# Patient Record
Sex: FEMALE | Race: WHITE | NOT HISPANIC OR LATINO | Employment: UNEMPLOYED | URBAN - METROPOLITAN AREA
[De-identification: names, ages, dates, MRNs, and addresses within clinical notes are randomized per-mention and may not be internally consistent; named-entity substitution may affect disease eponyms.]

---

## 2017-02-07 ENCOUNTER — HOSPITAL ENCOUNTER (OUTPATIENT)
Dept: CT IMAGING | Facility: HOSPITAL | Age: 47
Discharge: HOME | End: 2017-02-07
Attending: FAMILY MEDICINE | Admitting: FAMILY MEDICINE

## 2017-02-07 ENCOUNTER — OFFICE VISIT (OUTPATIENT)
Dept: INTERNAL MEDICINE | Facility: CLINIC | Age: 47
End: 2017-02-07

## 2017-02-07 ENCOUNTER — TELEPHONE (OUTPATIENT)
Dept: INTERNAL MEDICINE | Facility: CLINIC | Age: 47
End: 2017-02-07

## 2017-02-07 VITALS
HEART RATE: 83 BPM | BODY MASS INDEX: 45.86 KG/M2 | OXYGEN SATURATION: 94 % | SYSTOLIC BLOOD PRESSURE: 136 MMHG | TEMPERATURE: 98.5 F | DIASTOLIC BLOOD PRESSURE: 82 MMHG | HEIGHT: 63 IN | WEIGHT: 258.8 LBS

## 2017-02-07 DIAGNOSIS — R10.827 GENERALIZED ABDOMINAL TENDERNESS WITH REBOUND TENDERNESS: ICD-10-CM

## 2017-02-07 DIAGNOSIS — K92.1 MELENA: Primary | ICD-10-CM

## 2017-02-07 DIAGNOSIS — E11.49 CONTROLLED TYPE 2 DIABETES MELLITUS WITH OTHER NEUROLOGIC COMPLICATION: ICD-10-CM

## 2017-02-07 DIAGNOSIS — K92.1 MELENA: ICD-10-CM

## 2017-02-07 DIAGNOSIS — J02.9 SORE THROAT: ICD-10-CM

## 2017-02-07 PROBLEM — R10.829: Status: ACTIVE | Noted: 2017-02-07

## 2017-02-07 LAB
ALBUMIN SERPL-MCNC: 4.2 G/DL (ref 3.2–4.8)
ALBUMIN/GLOB SERPL: 1.4 G/DL (ref 1.5–2.5)
ALP SERPL-CCNC: 106 U/L (ref 25–100)
ALT SERPL W P-5'-P-CCNC: 40 U/L (ref 7–40)
ANION GAP SERPL CALCULATED.3IONS-SCNC: 8 MMOL/L (ref 3–11)
AST SERPL-CCNC: 34 U/L (ref 0–33)
BASOPHILS # BLD AUTO: 0.03 10*3/MM3 (ref 0–0.2)
BASOPHILS NFR BLD AUTO: 0.3 % (ref 0–1)
BILIRUB SERPL-MCNC: 0.4 MG/DL (ref 0.3–1.2)
BUN BLD-MCNC: 13 MG/DL (ref 9–23)
BUN/CREAT SERPL: 16.3 (ref 7–25)
CALCIUM SPEC-SCNC: 9.5 MG/DL (ref 8.7–10.4)
CHLORIDE SERPL-SCNC: 103 MMOL/L (ref 99–109)
CO2 SERPL-SCNC: 27 MMOL/L (ref 20–31)
CREAT BLD-MCNC: 0.8 MG/DL (ref 0.6–1.3)
DEPRECATED RDW RBC AUTO: 47.6 FL (ref 37–54)
EOSINOPHIL # BLD AUTO: 0.15 10*3/MM3 (ref 0.1–0.3)
EOSINOPHIL NFR BLD AUTO: 1.3 % (ref 0–3)
ERYTHROCYTE [DISTWIDTH] IN BLOOD BY AUTOMATED COUNT: 15 % (ref 11.3–14.5)
EXPIRATION DATE: NORMAL
GFR SERPL CREATININE-BSD FRML MDRD: 77 ML/MIN/1.73
GLOBULIN UR ELPH-MCNC: 3.1 GM/DL
GLUCOSE BLD-MCNC: 227 MG/DL (ref 70–100)
HBA1C MFR BLD: 8.3 % (ref 4.8–5.6)
HCT VFR BLD AUTO: 46.7 % (ref 34.5–44)
HGB BLD-MCNC: 15.5 G/DL (ref 11.5–15.5)
IMM GRANULOCYTES # BLD: 0.08 10*3/MM3 (ref 0–0.03)
IMM GRANULOCYTES NFR BLD: 0.7 % (ref 0–0.6)
INTERNAL CONTROL: NORMAL
LIPASE SERPL-CCNC: 69 U/L (ref 6–51)
LYMPHOCYTES # BLD AUTO: 2.95 10*3/MM3 (ref 0.6–4.8)
LYMPHOCYTES NFR BLD AUTO: 24.7 % (ref 24–44)
Lab: NORMAL
MCH RBC QN AUTO: 28.5 PG (ref 27–31)
MCHC RBC AUTO-ENTMCNC: 33.2 G/DL (ref 32–36)
MCV RBC AUTO: 86 FL (ref 80–99)
MONOCYTES # BLD AUTO: 0.72 10*3/MM3 (ref 0–1)
MONOCYTES NFR BLD AUTO: 6 % (ref 0–12)
NEUTROPHILS # BLD AUTO: 8.02 10*3/MM3 (ref 1.5–8.3)
NEUTROPHILS NFR BLD AUTO: 67 % (ref 41–71)
PLATELET # BLD AUTO: 252 10*3/MM3 (ref 150–450)
PMV BLD AUTO: 10.5 FL (ref 6–12)
POTASSIUM BLD-SCNC: 4.4 MMOL/L (ref 3.5–5.5)
PROT SERPL-MCNC: 7.3 G/DL (ref 5.7–8.2)
RBC # BLD AUTO: 5.43 10*6/MM3 (ref 3.89–5.14)
S PYO AG THROAT QL: NEGATIVE
SODIUM BLD-SCNC: 138 MMOL/L (ref 132–146)
WBC NRBC COR # BLD: 11.95 10*3/MM3 (ref 3.5–10.8)

## 2017-02-07 PROCEDURE — 83036 HEMOGLOBIN GLYCOSYLATED A1C: CPT | Performed by: FAMILY MEDICINE

## 2017-02-07 PROCEDURE — 74176 CT ABD & PELVIS W/O CONTRAST: CPT

## 2017-02-07 PROCEDURE — 83690 ASSAY OF LIPASE: CPT | Performed by: FAMILY MEDICINE

## 2017-02-07 PROCEDURE — 85025 COMPLETE CBC W/AUTO DIFF WBC: CPT | Performed by: FAMILY MEDICINE

## 2017-02-07 PROCEDURE — 87880 STREP A ASSAY W/OPTIC: CPT | Performed by: FAMILY MEDICINE

## 2017-02-07 PROCEDURE — 80053 COMPREHEN METABOLIC PANEL: CPT | Performed by: FAMILY MEDICINE

## 2017-02-07 PROCEDURE — 99214 OFFICE O/P EST MOD 30 MIN: CPT | Performed by: FAMILY MEDICINE

## 2017-02-07 PROCEDURE — 36415 COLL VENOUS BLD VENIPUNCTURE: CPT | Performed by: FAMILY MEDICINE

## 2017-02-07 NOTE — TELEPHONE ENCOUNTER
----- Message from Sarah Cohn sent at 2/7/2017  2:16 PM EST -----  Contact: PT  PT SAID SHE MISSED A CALL FROM OUR OFFICE CONCERNING HER CT.

## 2017-02-07 NOTE — PROGRESS NOTES
Subjective   Karli Ricardo is a 46 y.o. female.     History of Present Illness   Had some sort of cold last week, had a sore throat, and got better. Last night she was up all night due to the back and belly pain. Has a lot of soreness in abdomen. Feels swollen in abdomen. Abdomen tender even when she rubs it. Also tender on left side. No dysuria nor hematuria. Mother has had kidney stones but patient has not. She's had diarrhea off and on for 3 days. Describes bristol 1 stools at first and now is bristol 5-6. She's had some bristol 7 stools. Has had black stools, no bloody stools. No use of pepto bismol. Has had chills. Has felt feverish. Has had many abdominal surgeries including hysterectomy.     The following portions of the patient's history were reviewed and updated as appropriate: allergies, current medications, past family history, past medical history, past social history, past surgical history and problem list.    Review of Systems   Constitutional: Positive for activity change, appetite change, chills, fatigue and fever.   Gastrointestinal: Positive for abdominal distention, abdominal pain, nausea and vomiting.   Genitourinary: Negative for difficulty urinating, dysuria and hematuria.       Objective   Physical Exam   Constitutional: She is oriented to person, place, and time. Vital signs are normal. She appears well-developed and well-nourished. She is active. She appears ill.   Appears stated age. Well groomed. Extremely Obese     HENT:   Head: Normocephalic and atraumatic.   Right Ear: Hearing normal.   Left Ear: Hearing normal.   Nose: Nose normal.   Mouth/Throat: Uvula is midline and mucous membranes are normal. Posterior oropharyngeal erythema present.   Eyes: EOM and lids are normal. Pupils are equal, round, and reactive to light.   Neck: Phonation normal.   Cardiovascular: Normal rate, regular rhythm and normal heart sounds.    Pulmonary/Chest: Effort normal and breath sounds normal.    Abdominal: Soft. She exhibits distension. She exhibits no mass. Bowel sounds are decreased. There is no hepatosplenomegaly. There is generalized tenderness. There is rebound. There is no guarding.   Neurological: She is alert and oriented to person, place, and time. She displays no tremor. No cranial nerve deficit. Gait normal.   Skin: She is not diaphoretic. No cyanosis. There is pallor. Nails show no clubbing.   Psychiatric: She has a normal mood and affect. Her speech is normal and behavior is normal. Judgment and thought content normal.   Nursing note and vitals reviewed.    Lab Results   Component Value Date    HGBA1C 6.60 (H) 09/13/2016     Strep test negative.    Assessment/Plan   [place]ia was seen today for flank pain, back pain and diarrhea.    Diagnoses and all orders for this visit:    Melena  -     CT Abdomen Pelvis Without Contrast; Future  -     CBC Auto Differential; Future    Generalized abdominal tenderness with rebound tenderness  -     CT Abdomen Pelvis Without Contrast; Future  -     CBC Auto Differential; Future  -     Comprehensive Metabolic Panel; Future  -     Lipase; Future    Controlled type 2 diabetes mellitus with other neurologic complication  -     Hemoglobin A1c; Future    Sore throat  -     POC Rapid Strep A      Must rule out diverticulitis with perforation, pancreatitis, gastric/duodenal ulcer with perforation, etc. Peritoneal sign worrisome as is history of black stool. Stat CT scan requested.

## 2017-02-15 ENCOUNTER — OFFICE VISIT (OUTPATIENT)
Dept: INTERNAL MEDICINE | Facility: CLINIC | Age: 47
End: 2017-02-15

## 2017-02-15 VITALS
BODY MASS INDEX: 46.46 KG/M2 | HEIGHT: 63 IN | HEART RATE: 83 BPM | TEMPERATURE: 98.7 F | OXYGEN SATURATION: 97 % | SYSTOLIC BLOOD PRESSURE: 124 MMHG | WEIGHT: 262.2 LBS | DIASTOLIC BLOOD PRESSURE: 80 MMHG | RESPIRATION RATE: 14 BRPM

## 2017-02-15 DIAGNOSIS — L29.8 PRURITIC ERYTHEMATOUS RASH: ICD-10-CM

## 2017-02-15 DIAGNOSIS — IMO0001 UNCONTROLLED TYPE 2 DIABETES MELLITUS WITHOUT COMPLICATION, WITHOUT LONG-TERM CURRENT USE OF INSULIN: Primary | ICD-10-CM

## 2017-02-15 DIAGNOSIS — K92.1 MELENA: ICD-10-CM

## 2017-02-15 PROBLEM — L29.89 PRURITIC ERYTHEMATOUS RASH: Status: ACTIVE | Noted: 2017-02-15

## 2017-02-15 PROCEDURE — 99214 OFFICE O/P EST MOD 30 MIN: CPT | Performed by: FAMILY MEDICINE

## 2017-02-15 RX ORDER — METFORMIN HYDROCHLORIDE 750 MG/1
750 TABLET, EXTENDED RELEASE ORAL
Qty: 30 TABLET | Refills: 2 | Status: SHIPPED | OUTPATIENT
Start: 2017-02-15 | End: 2017-03-02

## 2017-02-17 NOTE — PROGRESS NOTES
Subjective   Karli Ricardo is a 46 y.o. female.     Abdominal Pain   This is a recurrent problem. The current episode started 1 to 4 weeks ago. The problem occurs intermittently. The problem has been waxing and waning. Associated symptoms include diarrhea and melena. Pertinent negatives include no fever or weight loss. Prior diagnostic workup includes CT scan.   Diabetes   She presents for her follow-up diabetic visit. She has type 2 diabetes mellitus. Her disease course has been worsening. Associated symptoms include polyphagia and polyuria. Pertinent negatives for diabetes include no chest pain and no weight loss. There are no hypoglycemic complications. Risk factors for coronary artery disease include diabetes mellitus, obesity and sedentary lifestyle. Current diabetic treatment includes oral agent (monotherapy). Her weight is increasing steadily. She is following a generally unhealthy diet. An ACE inhibitor/angiotensin II receptor blocker is being taken.      Continues to have rash on legs. She tried an antibiotic cream and it has not helped. Rash not as bright but itches. Has been present for >1 month.    The following portions of the patient's history were reviewed and updated as appropriate: allergies, current medications, past family history, past medical history, past social history, past surgical history and problem list.    Review of Systems   Constitutional: Negative for fever and weight loss.   Cardiovascular: Negative for chest pain.   Gastrointestinal: Positive for abdominal pain, diarrhea and melena.   Endocrine: Positive for polyphagia and polyuria.   Skin: Positive for rash.       Objective   Physical Exam   Constitutional: She is oriented to person, place, and time. Vital signs are normal. She appears well-developed and well-nourished. She is active.   HENT:   Head: Normocephalic and atraumatic.   Right Ear: Hearing normal.   Left Ear: Hearing normal.   Nose: Nose normal.   Eyes: EOM and  lids are normal. Pupils are equal, round, and reactive to light.   Cardiovascular: Normal rate, regular rhythm and normal heart sounds.    Pulmonary/Chest: Effort normal and breath sounds normal.   Neurological: She is alert and oriented to person, place, and time. No cranial nerve deficit. Gait normal.   Skin: Skin is warm. Rash (faintly erythematous circular rash to anterior legs bilaterally with serpiginous borders and some central clearing) noted. She is not diaphoretic. No cyanosis. Nails show no clubbing.   Psychiatric: She has a normal mood and affect. Her behavior is normal. Judgment and thought content normal.   Nursing note and vitals reviewed.    Lab Results   Component Value Date    HGBA1C 8.30 (H) 02/07/2017         Assessment/Plan   [place]ia was seen today for abdominal pain.    Diagnoses and all orders for this visit:    Uncontrolled type 2 diabetes mellitus without complication, without long-term current use of insulin  -     metFORMIN ER (GLUCOPHAGE-XR) 750 MG 24 hr tablet; Take 1 tablet by mouth Daily With Breakfast.    Pruritic erythematous rash  -     Clotrimazole 1 % ointment; Apply 1 application topically 2 (Two) Times a Day.    Melena    scheduled for further evaluation of melena

## 2017-03-02 ENCOUNTER — OFFICE VISIT (OUTPATIENT)
Dept: SURGERY | Facility: CLINIC | Age: 47
End: 2017-03-02

## 2017-03-02 VITALS
DIASTOLIC BLOOD PRESSURE: 88 MMHG | SYSTOLIC BLOOD PRESSURE: 140 MMHG | BODY MASS INDEX: 46.39 KG/M2 | HEIGHT: 63 IN | TEMPERATURE: 97.2 F | WEIGHT: 261.8 LBS

## 2017-03-02 DIAGNOSIS — K21.9 GASTROESOPHAGEAL REFLUX DISEASE WITHOUT ESOPHAGITIS: ICD-10-CM

## 2017-03-02 DIAGNOSIS — R10.13 EPIGASTRIC ABDOMINAL PAIN: Primary | ICD-10-CM

## 2017-03-02 PROCEDURE — 99244 OFF/OP CNSLTJ NEW/EST MOD 40: CPT | Performed by: SURGERY

## 2017-03-02 RX ORDER — METFORMIN HYDROCHLORIDE 750 MG/1
750 TABLET, EXTENDED RELEASE ORAL
COMMUNITY
End: 2017-10-26 | Stop reason: SDUPTHER

## 2017-03-02 RX ORDER — SODIUM CHLORIDE 9 MG/ML
30 INJECTION, SOLUTION INTRAVENOUS CONTINUOUS PRN
Status: CANCELLED | OUTPATIENT
Start: 2017-03-02

## 2017-03-02 NOTE — PROGRESS NOTES
Reason for Consultation:Epigastric pain, GERD      Chief complaint : I have abdominal pain  Chief Complaint   Patient presents with   • Abdominal Pain     Pt c/o pain in the epigastric and lower abd pain after eating for one month. +nausea and vomiting +diarrhea +constipation -rectal bleed       Subjective .     History of present illness:  I saw the patient in the office  today as a consultation for evaluation and treatment of abdominal pain.  Over the last 2 months she has had intermittent upper abdominal pain.  It is crampy in nature and radiates across the abdomen.  She has had some nausea and some vomiting.  It occurs daily.  At times it can be severe.  She has some chronic constipation.  Also has had some dark stools.  She is on Zantac.  Patient also complains of reflux chronically.  She had a colonoscopy 2-3 years ago.  She was diagnosed with diverticulosis at that time.    Review of Systems   Constitutional: Negative for chills, fever and unexpected weight change.   HENT: Negative for hearing loss, trouble swallowing and voice change.    Eyes: Negative for visual disturbance.   Respiratory: Negative for apnea, cough, chest tightness, shortness of breath and wheezing.    Cardiovascular: Negative for chest pain, palpitations and leg swelling.   Gastrointestinal: Positive for abdominal pain, constipation, diarrhea, nausea and vomiting. Negative for abdominal distention, anal bleeding, blood in stool and rectal pain.   Endocrine: Negative for cold intolerance and heat intolerance.   Genitourinary: Negative for difficulty urinating, dysuria and flank pain.   Musculoskeletal: Negative for back pain and gait problem.   Skin: Negative for color change, rash and wound.   Neurological: Negative for dizziness, syncope, speech difficulty, weakness, light-headedness, numbness and headaches.   Hematological: Negative for adenopathy. Does not bruise/bleed easily.   Psychiatric/Behavioral: Negative for confusion. The  patient is not nervous/anxious.        History  Past Medical History   Diagnosis Date   • Acid reflux    • Arthritis    • Asthma    • Chronic bronchitis    • Colon polyp    • Diabetes    • Fracture    • Gallstones    • Gout    • Hypertension    • Thyroid disease        Past Surgical History   Procedure Laterality Date   •  section     • Hysterectomy     • Dilatation and curettage     • Foot surgery     • Cholecystectomy         Family History   Problem Relation Age of Onset   • Arthritis Mother    • Mental illness Mother    • Migraines Mother    • Osteoporosis Mother    • Thyroid disease Mother    • Colon cancer Father    • Thyroid disease Sister        Social History   Substance Use Topics   • Smoking status: Current Every Day Smoker   • Smokeless tobacco: None   • Alcohol use No           Objective     Vital Signs   Temp:  [97.2 °F (36.2 °C)] 97.2 °F (36.2 °C)  BP: (140)/(88) 140/88    Physical Exam:     General Appearance:    Alert, cooperative, in no acute distress   Head:    Normocephalic, without obvious abnormality, atraumatic   Eyes:            Lids and lashes normal, conjunctivae and sclerae normal, no   icterus, no pallor, corneas clear, PERRLA   Ears:    Ears appear intact with no abnormalities noted   Throat:   No oral lesions, no thrush, oral mucosa moist   Lungs:     Clear to auscultation,respirations regular, even and                  unlabored    Heart:    Regular rhythm and normal rate, normal S1 and S2, no            murmur, no gallop, no rub, no click   Abdomen:     Normal bowel sounds, no masses, no organomegaly, soft        non-tender, non-distended, no guarding, there is no evidence of epigastric  tenderness.  Morbidly obese.     Extremities:   Moves all extremities well, no edema, no cyanosis, no             redness   Skin:   No bleeding, bruising or rash   Neurologic:   Cranial nerves 2 - 12 grossly intact, sensation intact  Psychiatric: No evidence of depression or anxiety         Results Review:   I reviewed her CBC and LFTs.  Also reviewed her CT scan including the actual films.      Assessment/Plan  epigastric abdominal pain of uncertain etiology.  GERD.  I discussed with her dietary measures as well as weight loss with respect to GERD.  We'll proceed with an EGD in the meanwhile.  Also recommend a fiber supplement of choice daily especially in lieu of her diverticulosis.      I did have a detailed and extensive discussion with the patient in the office and they understand that they need to undergo upper endoscopy. Full risks and benefits of operative versus nonoperative intervention were discussed with the patient and these include bleeding and esophageal injury. The patient understands, agrees, and wishes to proceed with the surgical treatment plan as mentioned above. The patient had no questions for me at the end of the discussion.       I discussed the patients findings and my recommendations with patient.       David Mccoy MD  03/02/17  4:00 PM

## 2017-03-31 ENCOUNTER — TELEPHONE (OUTPATIENT)
Dept: INTERNAL MEDICINE | Facility: CLINIC | Age: 47
End: 2017-03-31

## 2017-03-31 NOTE — TELEPHONE ENCOUNTER
She had given me FMLA papers when she was here with partner last week. I started working on them, but I actually need to sit with her and work on them, needs scheduled.

## 2017-04-03 ENCOUNTER — OFFICE VISIT (OUTPATIENT)
Dept: INTERNAL MEDICINE | Facility: CLINIC | Age: 47
End: 2017-04-03

## 2017-04-03 VITALS
TEMPERATURE: 97.5 F | SYSTOLIC BLOOD PRESSURE: 112 MMHG | OXYGEN SATURATION: 97 % | DIASTOLIC BLOOD PRESSURE: 80 MMHG | HEART RATE: 69 BPM | HEIGHT: 63 IN | BODY MASS INDEX: 45.54 KG/M2 | WEIGHT: 257 LBS

## 2017-04-03 DIAGNOSIS — Z23 NEED FOR PNEUMOCOCCAL VACCINATION: ICD-10-CM

## 2017-04-03 DIAGNOSIS — M62.838 NECK MUSCLE SPASM: Primary | ICD-10-CM

## 2017-04-03 DIAGNOSIS — IMO0001 UNCONTROLLED TYPE 2 DIABETES MELLITUS WITHOUT COMPLICATION, WITHOUT LONG-TERM CURRENT USE OF INSULIN: ICD-10-CM

## 2017-04-03 DIAGNOSIS — R10.9 RECURRENT ABDOMINAL PAIN: ICD-10-CM

## 2017-04-03 DIAGNOSIS — K92.1 MELENA: ICD-10-CM

## 2017-04-03 DIAGNOSIS — Z12.39 BREAST CANCER SCREENING: ICD-10-CM

## 2017-04-03 LAB
POC CREATININE URINE: 200
POC MICROALBUMIN URINE: 10

## 2017-04-03 PROCEDURE — 90732 PPSV23 VACC 2 YRS+ SUBQ/IM: CPT | Performed by: FAMILY MEDICINE

## 2017-04-03 PROCEDURE — 90471 IMMUNIZATION ADMIN: CPT | Performed by: FAMILY MEDICINE

## 2017-04-03 PROCEDURE — 82044 UR ALBUMIN SEMIQUANTITATIVE: CPT | Performed by: FAMILY MEDICINE

## 2017-04-03 PROCEDURE — 99214 OFFICE O/P EST MOD 30 MIN: CPT | Performed by: FAMILY MEDICINE

## 2017-04-03 RX ORDER — METHOCARBAMOL 750 MG/1
750 TABLET, FILM COATED ORAL 3 TIMES DAILY PRN
Qty: 90 TABLET | Refills: 3 | Status: SHIPPED | OUTPATIENT
Start: 2017-04-03 | End: 2017-12-12 | Stop reason: ALTCHOICE

## 2017-04-03 NOTE — PROGRESS NOTES
Subjective   Karli Ricardo is a 46 y.o. female.     History of Present Illness   Patient comes in today to have [employer] papers completed for work.  She is having recurrent bouts of nausea, diarrhea, bloody and black stools, and is undergoing evaluation for this.  Symptoms started in 2015 and she has yet to have a formal diagnosis.  Episodes occur a couple times a month and last 1-3 days at a time.    Has pain on the back of her neck, feels tight.  Has not tried a muscle relaxer and asked that is an option.  For NSAID candidate at this time due to gastric bleeding from her described symptoms.  Has been going on for at least a few weeks.    She is a type II diabetic not yet due for A1c.  She reports being able to provide a urine specimen today for a microalbumin test.  She has never had a pneumonia shot and is open to that.    As far as preventative health measures go, she is due for a mammogram and she is open to that as well.    The following portions of the patient's history were reviewed and updated as appropriate: allergies, current medications, past family history, past medical history, past social history, past surgical history and problem list.    Review of Systems   Constitutional: Positive for activity change.   Gastrointestinal: Positive for abdominal pain, blood in stool, diarrhea, nausea and vomiting.   Musculoskeletal: Positive for myalgias and neck pain.       Objective   Physical Exam   Constitutional: She is oriented to person, place, and time. Vital signs are normal. She appears well-developed and well-nourished. She is active. She does not appear ill.   Appears stated age. Well groomed. Extremely Obese     HENT:   Head: Normocephalic and atraumatic.   Right Ear: Hearing normal.   Left Ear: Hearing normal.   Nose: Nose normal.   Eyes: EOM and lids are normal. Pupils are equal, round, and reactive to light. No scleral icterus.   Neck: Phonation normal. Muscular tenderness (spasm) present. No  spinous process tenderness present. No rigidity. No edema and no erythema present.   Cardiovascular: Normal rate, regular rhythm and normal heart sounds.    Pulmonary/Chest: Effort normal and breath sounds normal.   Musculoskeletal: She exhibits no deformity.    Georgia had a diabetic foot exam performed today.   During the foot exam she had a monofilament test performed.    Neurological Sensory Findings -  Altered sharp/dull left ankle/foot discrimination. Unaltered sharp/dull right ankle/foot discrimination.    Vascular Status -  Her exam exhibits right foot vasculature abnormal and no right foot edema. Her exam exhibits left foot vasculature abnormal and no left foot edema.   Skin Integrity  -  Her right foot skin is not intact.   Georgia's left foot skin is not intact. .  Neurological: She is alert and oriented to person, place, and time. She displays no tremor. No cranial nerve deficit. Gait normal.   Decreased sensation left toes to monofilament   Skin: Skin is warm. She is not diaphoretic. No cyanosis. Nails show no clubbing.   Psychiatric: She has a normal mood and affect. Her behavior is normal. Judgment and thought content normal.   Nursing note and vitals reviewed.     Lab Results   Component Value Date    HGBA1C 8.30 (H) 02/07/2017     Reviewed records from [facility], Dr. Melendez did colonoscopy and EGD in 8/2015 after ER visit at [facility] on 8/4/15.      Assessment/Plan   Georgia was seen today for fmla and neck pain.    Diagnoses and all orders for this visit:    Neck muscle spasm  -     methocarbamol (ROBAXIN) 750 MG tablet; Take 1 tablet by mouth 3 (Three) Times a Day As Needed for Muscle Spasms.    Melena    Recurrent abdominal pain    Uncontrolled type 2 diabetes mellitus without complication, without long-term current use of insulin  -     Pneumococcal Polysaccharide Vaccine 23-Valent Greater Than or Equal To 3yo Subcutaneous / IM  -     POC Microalbumin    Breast cancer screening  -     Mammo  Screening Digital Tomosynthesis Bilateral With CAD; Future    Need for pneumococcal vaccination  -     Pneumococcal Polysaccharide Vaccine 23-Valent Greater Than or Equal To 1yo Subcutaneous / IM      FMLA papers completed due to recurrent abdominal issues.  Scheduled for specialist evaluation in May.  Encouraged keeping appointment.  May try muscle relaxer for neck, no imaging at this time.  Pneumovax 23 discussed and given, does not need another pneumonia shot until age 63.  Return as scheduled for diabetes follow-up, will be due for A1c at that time.

## 2017-04-07 RX ORDER — GABAPENTIN 300 MG/1
300 CAPSULE ORAL 3 TIMES DAILY PRN
Qty: 90 CAPSULE | Refills: 1 | Status: SHIPPED | OUTPATIENT
Start: 2017-04-07 | End: 2017-05-31 | Stop reason: SDUPTHER

## 2017-04-28 ENCOUNTER — HOSPITAL ENCOUNTER (OUTPATIENT)
Dept: GENERAL RADIOLOGY | Facility: HOSPITAL | Age: 47
Discharge: HOME | End: 2017-04-28
Attending: FAMILY MEDICINE | Admitting: FAMILY MEDICINE

## 2017-04-28 ENCOUNTER — OFFICE VISIT (OUTPATIENT)
Dept: INTERNAL MEDICINE | Facility: CLINIC | Age: 47
End: 2017-04-28

## 2017-04-28 VITALS
BODY MASS INDEX: 45.46 KG/M2 | SYSTOLIC BLOOD PRESSURE: 138 MMHG | HEART RATE: 72 BPM | TEMPERATURE: 98.3 F | DIASTOLIC BLOOD PRESSURE: 80 MMHG | HEIGHT: 63 IN | WEIGHT: 256.6 LBS | OXYGEN SATURATION: 96 %

## 2017-04-28 DIAGNOSIS — R07.81 RIB PAIN ON LEFT SIDE: ICD-10-CM

## 2017-04-28 DIAGNOSIS — R06.2 WHEEZING: ICD-10-CM

## 2017-04-28 DIAGNOSIS — R05.8 PRODUCTIVE COUGH: ICD-10-CM

## 2017-04-28 DIAGNOSIS — M62.838 MUSCLE SPASM: Primary | ICD-10-CM

## 2017-04-28 PROCEDURE — 99213 OFFICE O/P EST LOW 20 MIN: CPT | Performed by: FAMILY MEDICINE

## 2017-04-28 PROCEDURE — 71100 X-RAY EXAM RIBS UNI 2 VIEWS: CPT

## 2017-04-28 RX ORDER — AZITHROMYCIN 250 MG/1
TABLET, FILM COATED ORAL
Qty: 6 TABLET | Refills: 0 | Status: SHIPPED | OUTPATIENT
Start: 2017-04-28 | End: 2017-10-26

## 2017-04-28 RX ORDER — ALBUTEROL SULFATE 90 UG/1
2 AEROSOL, METERED RESPIRATORY (INHALATION) EVERY 6 HOURS PRN
Qty: 1 INHALER | Refills: 3 | Status: SHIPPED | OUTPATIENT
Start: 2017-04-28 | End: 2017-10-26 | Stop reason: SDUPTHER

## 2017-04-28 NOTE — PATIENT INSTRUCTIONS
Cervical Sprain  A cervical sprain is an injury in the neck in which the strong, fibrous tissues (ligaments) that connect your neck bones stretch or tear. Cervical sprains can range from mild to severe. Severe cervical sprains can cause the neck vertebrae to be unstable. This can lead to damage of the spinal cord and can result in serious nervous system problems. The amount of time it takes for a cervical sprain to get better depends on the cause and extent of the injury. Most cervical sprains heal in 1 to 3 weeks.  CAUSES   Severe cervical sprains may be caused by:   · Contact sport injuries (such as from football, rugby, wrestling, hockey, auto racing, gymnastics, diving, martial arts, or boxing).    · Motor vehicle collisions.    · Whiplash injuries. This is an injury from a sudden forward and backward whipping movement of the head and neck.   · Falls.    Mild cervical sprains may be caused by:   · Being in an awkward position, such as while cradling a telephone between your ear and shoulder.    · Sitting in a chair that does not offer proper support.    · Working at a poorly designed computer station.    · Looking up or down for long periods of time.    SYMPTOMS   · Pain, soreness, stiffness, or a burning sensation in the front, back, or sides of the neck. This discomfort may develop immediately after the injury or slowly, 24 hours or more after the injury.    · Pain or tenderness directly in the middle of the back of the neck.    · Shoulder or upper back pain.    · Limited ability to move the neck.    · Headache.    · Dizziness.    · Weakness, numbness, or tingling in the hands or arms.    · Muscle spasms.    · Difficulty swallowing or chewing.    · Tenderness and swelling of the neck.    DIAGNOSIS   Most of the time your health care provider can diagnose a cervical sprain by taking your history and doing a physical exam. Your health care provider will ask about previous neck injuries and any known neck  problems, such as arthritis in the neck. X-rays may be taken to find out if there are any other problems, such as with the bones of the neck. Other tests, such as a CT scan or MRI, may also be needed.   TREATMENT   Treatment depends on the severity of the cervical sprain. Mild sprains can be treated with rest, keeping the neck in place (immobilization), and pain medicines. Severe cervical sprains are immediately immobilized. Further treatment is done to help with pain, muscle spasms, and other symptoms and may include:  · Medicines, such as pain relievers, numbing medicines, or muscle relaxants.    · Physical therapy. This may involve stretching exercises, strengthening exercises, and posture training. Exercises and improved posture can help stabilize the neck, strengthen muscles, and help stop symptoms from returning.    HOME CARE INSTRUCTIONS   · Put ice on the injured area.      Put ice in a plastic bag.      Place a towel between your skin and the bag.      Leave the ice on for 15-20 minutes, 3-4 times a day.    · If your injury was severe, you may have been given a cervical collar to wear. A cervical collar is a two-piece collar designed to keep your neck from moving while it heals.    Do not remove the collar unless instructed by your health care provider.    If you have long hair, keep it outside of the collar.    Ask your health care provider before making any adjustments to your collar. Minor adjustments may be required over time to improve comfort and reduce pressure on your chin or on the back of your head.    If you are allowed to remove the collar for cleaning or bathing, follow your health care provider's instructions on how to do so safely.    Keep your collar clean by wiping it with mild soap and water and drying it completely. If the collar you have been given includes removable pads, remove them every 1-2 days and hand wash them with soap and water. Allow them to air dry. They should be completely  dry before you wear them in the collar.    If you are allowed to remove the collar for cleaning and bathing, wash and dry the skin of your neck. Check your skin for irritation or sores. If you see any, tell your health care provider.    Do not drive while wearing the collar.    · Only take over-the-counter or prescription medicines for pain, discomfort, or fever as directed by your health care provider.    · Keep all follow-up appointments as directed by your health care provider.    · Keep all physical therapy appointments as directed by your health care provider.    · Make any needed adjustments to your workstation to promote good posture.    · Avoid positions and activities that make your symptoms worse.    · Warm up and stretch before being active to help prevent problems.    SEEK MEDICAL CARE IF:   · Your pain is not controlled with medicine.    · You are unable to decrease your pain medicine over time as planned.    · Your activity level is not improving as expected.    SEEK IMMEDIATE MEDICAL CARE IF:   · You develop any bleeding.  · You develop stomach upset.  · You have signs of an allergic reaction to your medicine.    · Your symptoms get worse.    · You develop new, unexplained symptoms.    · You have numbness, tingling, weakness, or paralysis in any part of your body.    MAKE SURE YOU:   · Understand these instructions.  · Will watch your condition.  · Will get help right away if you are not doing well or get worse.     This information is not intended to replace advice given to you by your health care provider. Make sure you discuss any questions you have with your health care provider.     Document Released: 10/14/2008 Document Revised: 12/23/2014 Document Reviewed: 06/25/2014  [employer] Patient Education ©2016 [employer]

## 2017-04-28 NOTE — PROGRESS NOTES
Subjective   Karli Ricardo is a 46 y.o. female.     History of Present Illness   Patient mowing nine days ago. She was on riding mower, essentially clothes lined on a wire, fell back and broke steering wheel and column. Pain on left flank. She worries she has a broken rib. Pain has not improved. Some pain in neck as well as top part of back. Has been taking aleve, ibuprofen, robaxin.     The following portions of the patient's history were reviewed and updated as appropriate: allergies, current medications, past family history, past medical history, past social history, past surgical history and problem list.    Review of Systems   Constitutional: Negative for chills and fever.   Respiratory: Positive for cough.    Cardiovascular: Positive for chest pain (wall).   Skin: Positive for color change.       Objective   Physical Exam   Constitutional: She is oriented to person, place, and time. She appears well-developed and well-nourished. No distress.   HENT:   Head: Normocephalic and atraumatic.   Eyes: EOM are normal.   Pulmonary/Chest: Effort normal. She has no decreased breath sounds. She has wheezes (diffuse). She has rhonchi (questionable right). She has no rales.       Musculoskeletal: She exhibits no deformity.   Neurological: She is alert and oriented to person, place, and time.   Skin: She is not diaphoretic.   Psychiatric: She has a normal mood and affect. Her behavior is normal.   Nursing note and vitals reviewed.      Assessment/Plan   Georgia was seen today for rib pain.    Diagnoses and all orders for this visit:    Muscle spasm    Rib pain on left side  -     Cancel: XR ribs 2 vw left; Future  -     XR ribs 2 vw left; Future    Productive cough  -     azithromycin (ZITHROMAX) 250 MG tablet; Take 2 tablets the first day, then 1 tablet daily for 4 days.    Wheezing  -     albuterol (PROVENTIL HFA;VENTOLIN HFA) 108 (90 BASE) MCG/ACT inhaler; Inhale 2 puffs Every 6 (Six) Hours As Needed for Wheezing  or Shortness of Air.

## 2017-05-31 ENCOUNTER — TELEPHONE (OUTPATIENT)
Dept: INTERNAL MEDICINE | Facility: CLINIC | Age: 47
End: 2017-05-31

## 2017-05-31 RX ORDER — GABAPENTIN 300 MG/1
300 CAPSULE ORAL 3 TIMES DAILY PRN
Qty: 90 CAPSULE | Refills: 1 | Status: SHIPPED | OUTPATIENT
Start: 2017-05-31 | End: 2017-07-28 | Stop reason: SDUPTHER

## 2017-05-31 RX ORDER — RANITIDINE 150 MG/1
150 CAPSULE ORAL 2 TIMES DAILY PRN
Qty: 60 CAPSULE | Refills: 5 | Status: SHIPPED | OUTPATIENT
Start: 2017-05-31 | End: 2017-10-26 | Stop reason: SDUPTHER

## 2017-07-31 RX ORDER — GABAPENTIN 300 MG/1
300 CAPSULE ORAL 3 TIMES DAILY PRN
Qty: 90 CAPSULE | Refills: 0 | OUTPATIENT
Start: 2017-07-31 | End: 2017-10-30 | Stop reason: SDUPTHER

## 2017-10-02 ENCOUNTER — TELEPHONE (OUTPATIENT)
Dept: INTERNAL MEDICINE | Facility: CLINIC | Age: 47
End: 2017-10-02

## 2017-10-26 ENCOUNTER — OFFICE VISIT (OUTPATIENT)
Dept: INTERNAL MEDICINE | Facility: CLINIC | Age: 47
End: 2017-10-26

## 2017-10-26 VITALS
SYSTOLIC BLOOD PRESSURE: 122 MMHG | TEMPERATURE: 98.4 F | HEIGHT: 63 IN | HEART RATE: 82 BPM | RESPIRATION RATE: 16 BRPM | OXYGEN SATURATION: 96 % | WEIGHT: 260.19 LBS | DIASTOLIC BLOOD PRESSURE: 84 MMHG | BODY MASS INDEX: 46.1 KG/M2

## 2017-10-26 DIAGNOSIS — R10.84 GENERALIZED ABDOMINAL PAIN: Primary | ICD-10-CM

## 2017-10-26 DIAGNOSIS — R63.0 DECREASE IN APPETITE: ICD-10-CM

## 2017-10-26 DIAGNOSIS — R06.2 WHEEZING: ICD-10-CM

## 2017-10-26 DIAGNOSIS — IMO0001 UNCONTROLLED TYPE 2 DIABETES MELLITUS WITHOUT COMPLICATION, WITHOUT LONG-TERM CURRENT USE OF INSULIN: ICD-10-CM

## 2017-10-26 DIAGNOSIS — L65.9 HAIR LOSS: ICD-10-CM

## 2017-10-26 PROBLEM — R05.8 PRODUCTIVE COUGH: Status: RESOLVED | Noted: 2017-04-28 | Resolved: 2017-10-26

## 2017-10-26 LAB
BILIRUB BLD-MCNC: NEGATIVE MG/DL
CLARITY, POC: CLEAR
COLOR UR: YELLOW
GLUCOSE UR STRIP-MCNC: ABNORMAL MG/DL
KETONES UR QL: NEGATIVE
LEUKOCYTE EST, POC: NEGATIVE
NITRITE UR-MCNC: NEGATIVE MG/ML
PH UR: 5 [PH] (ref 5–8)
PROT UR STRIP-MCNC: NEGATIVE MG/DL
RBC # UR STRIP: ABNORMAL /UL
SP GR UR: 1.02 (ref 1–1.03)
UROBILINOGEN UR QL: NORMAL

## 2017-10-26 PROCEDURE — 99214 OFFICE O/P EST MOD 30 MIN: CPT | Performed by: NURSE PRACTITIONER

## 2017-10-26 PROCEDURE — 81003 URINALYSIS AUTO W/O SCOPE: CPT | Performed by: NURSE PRACTITIONER

## 2017-10-26 RX ORDER — ALBUTEROL SULFATE 90 UG/1
2 AEROSOL, METERED RESPIRATORY (INHALATION) EVERY 6 HOURS PRN
Qty: 1 INHALER | Refills: 3 | Status: SHIPPED | OUTPATIENT
Start: 2017-10-26 | End: 2018-07-17 | Stop reason: SDUPTHER

## 2017-10-26 RX ORDER — RANITIDINE 150 MG/1
150 CAPSULE ORAL 2 TIMES DAILY PRN
Qty: 60 CAPSULE | Refills: 2 | Status: SHIPPED | OUTPATIENT
Start: 2017-10-26 | End: 2017-10-26

## 2017-10-26 RX ORDER — ONDANSETRON 4 MG/1
4 TABLET, ORALLY DISINTEGRATING ORAL EVERY 8 HOURS PRN
Qty: 12 TABLET | Refills: 0 | Status: SHIPPED | OUTPATIENT
Start: 2017-10-26 | End: 2019-05-01 | Stop reason: DRUGHIGH

## 2017-10-26 RX ORDER — FAMOTIDINE 20 MG/1
20 TABLET, FILM COATED ORAL 2 TIMES DAILY
Qty: 60 TABLET | Refills: 1 | Status: SHIPPED | OUTPATIENT
Start: 2017-10-26 | End: 2018-07-17

## 2017-10-26 RX ORDER — METFORMIN HYDROCHLORIDE 750 MG/1
750 TABLET, EXTENDED RELEASE ORAL
Qty: 30 TABLET | Refills: 2 | Status: SHIPPED | OUTPATIENT
Start: 2017-10-26 | End: 2018-07-17 | Stop reason: SDUPTHER

## 2017-10-26 NOTE — PROGRESS NOTES
Chief Complaint / Reason:      Chief Complaint   Patient presents with   • Abdominal Pain     onset a few days ago, nausea.    • Cyst     to neck? hair loss?        Subjective   Patient presents today with complaints of abdominal pain which the onset was a few days ago, nausea, hair loss and to neck.  She denies any sick contacts or any consumption of undercooked foods.  She states that she has had abdominal pain in the past and has a history of reflux.  Abdominal Pain   This is a recurrent problem. The current episode started in the past 7 days (Patient has had abdominal pain in the past and is currently on Zantac.). The onset quality is undetermined. The problem occurs daily. The problem has been waxing and waning. The pain is located in the generalized abdominal region. The pain is mild. The quality of the pain is cramping and aching. The abdominal pain does not radiate. Associated symptoms include arthralgias, constipation, diarrhea, headaches, myalgias, nausea and vomiting. The pain is aggravated by movement, certain positions and eating. The pain is relieved by certain positions. She has tried antacids and H2 blockers (Patient has tried dietary modifications And not eating) for the symptoms. The treatment provided mild relief. Her past medical history is significant for GERD.       History taken from: patient    PMH/FH/Social History were reviewed and updated appropriately in the electronic medical record.     Review of Systems:   Review of Systems   Constitutional: Positive for activity change and fatigue.   HENT:        Hair loss     Respiratory: Positive for shortness of breath.    Gastrointestinal: Positive for abdominal pain, constipation, diarrhea, nausea and vomiting.   Musculoskeletal: Positive for arthralgias, joint swelling, myalgias and neck pain.   Neurological: Positive for dizziness, numbness and headaches.     All other systems were reviewed and are negative.  Exceptions are noted in the  subjective or above.      Objective     Vital Signs  Vitals:    10/26/17 1743   BP: 122/84   Pulse: 82   Resp: 16   Temp: 98.4 °F (36.9 °C)   SpO2: 96%       Body mass index is 46.09 kg/(m^2).    Physical Exam   Constitutional: She is oriented to person, place, and time. She appears well-developed and well-nourished.   HENT:   Head: Normocephalic. Hair is abnormal.   Eyes: Pupils are equal, round, and reactive to light.   Neck: Normal range of motion.   Cardiovascular: Normal rate, regular rhythm, normal heart sounds and intact distal pulses.    Pulmonary/Chest: Effort normal and breath sounds normal. She has no wheezes.   Abdominal: Soft. Bowel sounds are normal. There is generalized tenderness. There is no rebound, no guarding and no CVA tenderness.   Musculoskeletal: She exhibits tenderness.        Cervical back: She exhibits tenderness.   Neurological: She is alert and oriented to person, place, and time.   Skin: Skin is warm and dry.   Psychiatric: She has a normal mood and affect. Her behavior is normal. Judgment and thought content normal.   Nursing note and vitals reviewed.       Results Review:    I reviewed the patient's new clinical results.   Office Visit on 10/26/2017   Component Date Value Ref Range Status   • TSH 10/27/2017 3.560  0.470 - 4.680 mIU/mL Final   • Color 10/26/2017 Yellow  Yellow, Straw, Dark Yellow, Maria Final   • Clarity, UA 10/26/2017 Clear  Clear Final   • Glucose, UA 10/26/2017 50 mg/dL* Negative, 1000 mg/dL (3+) mg/dL Final   • Bilirubin 10/26/2017 Negative  Negative Final   • Ketones, UA 10/26/2017 Negative  Negative Final   • Specific Gravity  10/26/2017 1.025  1.005 - 1.030 Final   • Blood, UA 10/26/2017 50 Pascual/ul* Negative Final   • pH, Urine 10/26/2017 5.0  5.0 - 8.0 Final   • Protein, POC 10/26/2017 Negative  Negative mg/dL Final   • Urobilinogen, UA 10/26/2017 Normal  Normal Final   • Leukocytes 10/26/2017 Negative  Negative Final   • Nitrite, UA 10/26/2017 Negative   Negative Final         Medication Review:     Current Outpatient Prescriptions:   •  albuterol (PROVENTIL HFA;VENTOLIN HFA) 108 (90 Base) MCG/ACT inhaler, Inhale 2 puffs Every 6 (Six) Hours As Needed for Wheezing or Shortness of Air., Disp: 1 inhaler, Rfl: 3  •  gabapentin (NEURONTIN) 300 MG capsule, Take 1 capsule by mouth 3 (Three) Times a Day As Needed (numbness/burning pain)., Disp: 90 capsule, Rfl: 0  •  metFORMIN ER (GLUCOPHAGE-XR) 750 MG 24 hr tablet, Take 1 tablet by mouth Daily With Breakfast., Disp: 30 tablet, Rfl: 2  •  methocarbamol (ROBAXIN) 750 MG tablet, Take 1 tablet by mouth 3 (Three) Times a Day As Needed for Muscle Spasms., Disp: 90 tablet, Rfl: 3  •  famotidine (PEPCID) 20 MG tablet, Take 1 tablet by mouth 2 (Two) Times a Day., Disp: 60 tablet, Rfl: 1  •  ondansetron ODT (ZOFRAN ODT) 4 MG disintegrating tablet, Take 1 tablet by mouth Every 8 (Eight) Hours As Needed for Nausea or Vomiting., Disp: 12 tablet, Rfl: 0    Assessment/Plan   Georgia was seen today for abdominal pain and cyst.    Diagnoses and all orders for this visit:    Generalized abdominal pain  -     famotidine (PEPCID) 20 MG tablet; Take 1 tablet by mouth 2 (Two) Times a Day.  -     POCT urinalysis dipstick, automated    Wheezing  -     albuterol (PROVENTIL HFA;VENTOLIN HFA) 108 (90 Base) MCG/ACT inhaler; Inhale 2 puffs Every 6 (Six) Hours As Needed for Wheezing or Shortness of Air.  -     Helicobacter Pylori, IgA IgG IgM    Hair loss  -     TSH    Decrease in appetite  -     ondansetron ODT (ZOFRAN ODT) 4 MG disintegrating tablet; Take 1 tablet by mouth Every 8 (Eight) Hours As Needed for Nausea or Vomiting.    Uncontrolled type 2 diabetes mellitus without complication, without long-term current use of insulin  -     metFORMIN ER (GLUCOPHAGE-XR) 750 MG 24 hr tablet; Take 1 tablet by mouth Daily With Breakfast.    Other orders  -     Discontinue: ranitidine (ZANTAC) 150 MG capsule; Take 1 capsule by mouth 2 (Two) Times a Day As  Needed for Indigestion or Heartburn.    Recommend patient closely observe any triggers or relief related to abdominal pain.  Advised patient to eat small frequent meals.  Discussed dietary modifications.  Discussed worsening signs and symptoms.  Discussed reflux with patient and symptoms associated.  Return in about 4 weeks (around 11/23/2017), or if symptoms worsen or fail to improve.    Najma Ziegler, APRN  10/26/2017

## 2017-10-27 ENCOUNTER — TELEPHONE (OUTPATIENT)
Dept: INTERNAL MEDICINE | Facility: CLINIC | Age: 47
End: 2017-10-27

## 2017-10-27 NOTE — TELEPHONE ENCOUNTER
Seen Zabrina yesterday for acute visit and requested refill on Gabapentin. She has not been seen since April. Ok to call in 1 month until she can get in to be seen?

## 2017-10-30 LAB
H PYLORI IGA SER-ACNC: <9 UNITS (ref 0–8.9)
H PYLORI IGG SER IA-ACNC: <0.9 U/ML (ref 0–0.8)
H PYLORI IGM SER-ACNC: <9 UNITS (ref 0–8.9)
TSH SERPL DL<=0.005 MIU/L-ACNC: 3.56 MIU/ML (ref 0.47–4.68)

## 2017-10-30 RX ORDER — GABAPENTIN 300 MG/1
300 CAPSULE ORAL 3 TIMES DAILY PRN
Qty: 90 CAPSULE | Refills: 0 | OUTPATIENT
Start: 2017-10-30 | End: 2021-03-19

## 2017-11-02 ENCOUNTER — APPOINTMENT (OUTPATIENT)
Dept: CT IMAGING | Facility: HOSPITAL | Age: 47
End: 2017-11-02

## 2017-11-02 ENCOUNTER — HOSPITAL ENCOUNTER (EMERGENCY)
Facility: HOSPITAL | Age: 47
Discharge: HOME | End: 2017-11-03
Attending: EMERGENCY MEDICINE | Admitting: EMERGENCY MEDICINE

## 2017-11-02 DIAGNOSIS — R10.9 BILATERAL FLANK PAIN: Primary | ICD-10-CM

## 2017-11-02 PROCEDURE — 85025 COMPLETE CBC W/AUTO DIFF WBC: CPT | Performed by: PHYSICIAN ASSISTANT

## 2017-11-02 PROCEDURE — 96374 THER/PROPH/DIAG INJ IV PUSH: CPT

## 2017-11-02 PROCEDURE — 25010000002 ONDANSETRON PER 1 MG: Performed by: PHYSICIAN ASSISTANT

## 2017-11-02 PROCEDURE — 74176 CT ABD & PELVIS W/O CONTRAST: CPT

## 2017-11-02 PROCEDURE — 96375 TX/PRO/DX INJ NEW DRUG ADDON: CPT

## 2017-11-02 PROCEDURE — 96361 HYDRATE IV INFUSION ADD-ON: CPT

## 2017-11-02 PROCEDURE — 83690 ASSAY OF LIPASE: CPT | Performed by: PHYSICIAN ASSISTANT

## 2017-11-02 PROCEDURE — 80053 COMPREHEN METABOLIC PANEL: CPT | Performed by: PHYSICIAN ASSISTANT

## 2017-11-02 PROCEDURE — 25010000002 KETOROLAC TROMETHAMINE PER 15 MG: Performed by: PHYSICIAN ASSISTANT

## 2017-11-02 PROCEDURE — 99283 EMERGENCY DEPT VISIT LOW MDM: CPT

## 2017-11-02 RX ORDER — ONDANSETRON 2 MG/ML
4 INJECTION INTRAMUSCULAR; INTRAVENOUS ONCE
Status: COMPLETED | OUTPATIENT
Start: 2017-11-02 | End: 2017-11-02

## 2017-11-02 RX ORDER — SODIUM CHLORIDE 0.9 % (FLUSH) 0.9 %
10 SYRINGE (ML) INJECTION AS NEEDED
Status: DISCONTINUED | OUTPATIENT
Start: 2017-11-02 | End: 2017-11-03 | Stop reason: HOSPADM

## 2017-11-02 RX ORDER — KETOROLAC TROMETHAMINE 30 MG/ML
30 INJECTION, SOLUTION INTRAMUSCULAR; INTRAVENOUS ONCE
Status: COMPLETED | OUTPATIENT
Start: 2017-11-02 | End: 2017-11-02

## 2017-11-02 RX ADMIN — SODIUM CHLORIDE 1000 ML: 9 INJECTION, SOLUTION INTRAVENOUS at 23:50

## 2017-11-02 RX ADMIN — KETOROLAC TROMETHAMINE 30 MG: 30 INJECTION, SOLUTION INTRAMUSCULAR at 23:52

## 2017-11-02 RX ADMIN — ONDANSETRON 4 MG: 2 INJECTION INTRAMUSCULAR; INTRAVENOUS at 23:51

## 2017-11-03 VITALS
RESPIRATION RATE: 18 BRPM | HEIGHT: 64 IN | SYSTOLIC BLOOD PRESSURE: 124 MMHG | HEART RATE: 66 BPM | BODY MASS INDEX: 38.41 KG/M2 | OXYGEN SATURATION: 98 % | TEMPERATURE: 98.7 F | WEIGHT: 225 LBS | DIASTOLIC BLOOD PRESSURE: 75 MMHG

## 2017-11-03 LAB
ALBUMIN SERPL-MCNC: 4.2 G/DL (ref 3.5–5)
ALBUMIN/GLOB SERPL: 1.2 G/DL (ref 1–2)
ALP SERPL-CCNC: 93 U/L (ref 38–126)
ALT SERPL W P-5'-P-CCNC: 38 U/L (ref 13–69)
ANION GAP SERPL CALCULATED.3IONS-SCNC: 16 MMOL/L
AST SERPL-CCNC: 27 U/L (ref 15–46)
BASOPHILS # BLD AUTO: 0.06 10*3/MM3 (ref 0–0.2)
BASOPHILS NFR BLD AUTO: 0.5 % (ref 0–2.5)
BILIRUB SERPL-MCNC: 0.3 MG/DL (ref 0.2–1.3)
BILIRUB UR QL STRIP: NEGATIVE
BUN BLD-MCNC: 14 MG/DL (ref 7–20)
BUN/CREAT SERPL: 17.5 (ref 7.1–23.5)
CALCIUM SPEC-SCNC: 9.7 MG/DL (ref 8.4–10.2)
CHLORIDE SERPL-SCNC: 102 MMOL/L (ref 98–107)
CLARITY UR: ABNORMAL
CO2 SERPL-SCNC: 29 MMOL/L (ref 26–30)
COLOR UR: YELLOW
CREAT BLD-MCNC: 0.8 MG/DL (ref 0.6–1.3)
DEPRECATED RDW RBC AUTO: 43.8 FL (ref 37–54)
EOSINOPHIL # BLD AUTO: 0.12 10*3/MM3 (ref 0–0.7)
EOSINOPHIL NFR BLD AUTO: 0.9 % (ref 0–7)
ERYTHROCYTE [DISTWIDTH] IN BLOOD BY AUTOMATED COUNT: 14.5 % (ref 11.5–14.5)
GFR SERPL CREATININE-BSD FRML MDRD: 76 ML/MIN/1.73
GLOBULIN UR ELPH-MCNC: 3.4 GM/DL
GLUCOSE BLD-MCNC: 143 MG/DL (ref 74–98)
GLUCOSE UR STRIP-MCNC: NEGATIVE MG/DL
HCT VFR BLD AUTO: 46.3 % (ref 37–47)
HGB BLD-MCNC: 15.1 G/DL (ref 12–16)
HGB UR QL STRIP.AUTO: NEGATIVE
IMM GRANULOCYTES # BLD: 0.08 10*3/MM3 (ref 0–0.06)
IMM GRANULOCYTES NFR BLD: 0.6 % (ref 0–0.6)
KETONES UR QL STRIP: NEGATIVE
LEUKOCYTE ESTERASE UR QL STRIP.AUTO: NEGATIVE
LIPASE SERPL-CCNC: 385 U/L (ref 23–300)
LYMPHOCYTES # BLD AUTO: 3.91 10*3/MM3 (ref 0.6–3.4)
LYMPHOCYTES NFR BLD AUTO: 30 % (ref 10–50)
MCH RBC QN AUTO: 27.4 PG (ref 27–31)
MCHC RBC AUTO-ENTMCNC: 32.6 G/DL (ref 30–37)
MCV RBC AUTO: 83.9 FL (ref 81–99)
MONOCYTES # BLD AUTO: 1 10*3/MM3 (ref 0–0.9)
MONOCYTES NFR BLD AUTO: 7.7 % (ref 0–12)
NEUTROPHILS # BLD AUTO: 7.87 10*3/MM3 (ref 2–6.9)
NEUTROPHILS NFR BLD AUTO: 60.3 % (ref 37–80)
NITRITE UR QL STRIP: NEGATIVE
NRBC BLD MANUAL-RTO: 0 /100 WBC (ref 0–0)
PH UR STRIP.AUTO: 6 [PH] (ref 5–8)
PLATELET # BLD AUTO: 256 10*3/MM3 (ref 130–400)
PMV BLD AUTO: 10.1 FL (ref 6–12)
POTASSIUM BLD-SCNC: 4 MMOL/L (ref 3.5–5.1)
PROT SERPL-MCNC: 7.6 G/DL (ref 6.3–8.2)
PROT UR QL STRIP: NEGATIVE
RBC # BLD AUTO: 5.52 10*6/MM3 (ref 4.2–5.4)
SODIUM BLD-SCNC: 143 MMOL/L (ref 137–145)
SP GR UR STRIP: 1.02 (ref 1–1.03)
UROBILINOGEN UR QL STRIP: ABNORMAL
WBC NRBC COR # BLD: 13.04 10*3/MM3 (ref 4.8–10.8)

## 2017-11-03 PROCEDURE — 81003 URINALYSIS AUTO W/O SCOPE: CPT | Performed by: PHYSICIAN ASSISTANT

## 2017-11-03 PROCEDURE — 25010000002 MORPHINE PER 10 MG: Performed by: EMERGENCY MEDICINE

## 2017-11-03 PROCEDURE — 96375 TX/PRO/DX INJ NEW DRUG ADDON: CPT

## 2017-11-03 RX ORDER — CYCLOBENZAPRINE HCL 10 MG
10 TABLET ORAL NIGHTLY PRN
Qty: 10 TABLET | Refills: 0 | Status: SHIPPED | OUTPATIENT
Start: 2017-11-03 | End: 2017-12-12

## 2017-11-03 RX ORDER — MELOXICAM 15 MG/1
15 TABLET ORAL DAILY
Qty: 10 TABLET | Refills: 0 | Status: SHIPPED | OUTPATIENT
Start: 2017-11-03 | End: 2017-12-12

## 2017-11-03 RX ORDER — MORPHINE SULFATE 2 MG/ML
4 INJECTION, SOLUTION INTRAMUSCULAR; INTRAVENOUS ONCE
Status: COMPLETED | OUTPATIENT
Start: 2017-11-03 | End: 2017-11-03

## 2017-11-03 RX ADMIN — MORPHINE SULFATE 4 MG: 2 INJECTION, SOLUTION INTRAMUSCULAR; INTRAVENOUS at 00:56

## 2017-11-03 NOTE — DISCHARGE INSTRUCTIONS
No lifting, bending, pushing or pulling until cleared by your doctor.  Follow-up with the or doctor in 1-2 days for further workup, treatment and evaluation.    Return to the ER for worsening abdominal pain, fever, vomiting, new or worsening symptoms.

## 2017-11-03 NOTE — ED PROVIDER NOTES
Subjective   HPI Comments: 47-year-old female with no history of kidney stones to her knowledge.  She is here with bilateral flank pain, right greater than left since Saturday which would be 6 days prior to arrival with radiation to the periumbilical region without urinary complaints, vomiting or diarrhea.  No hematuria.  Some nausea.  She is status post hysterectomy.  The pain is severe and sharp.  Worse with range of motion.  Apparently was seen at her primary care physician's office earlier this week and had blood in her urine according to her report.  Sent home with nausea medication.  No injury to her back.    Aggravating factors: Movement.  Alleviating factors: Remaining still.  Treatment prior to arrival: Aleve and unknown nausea medication.          History provided by:  Patient  History limited by: nothing.   used: No        Review of Systems   Constitutional: Negative.    HENT: Negative.    Eyes: Negative.    Respiratory: Negative.    Cardiovascular: Negative.  Negative for chest pain.   Gastrointestinal: Positive for abdominal pain and nausea.   Endocrine: Negative.    Genitourinary: Positive for flank pain. Negative for dysuria.   Musculoskeletal: Positive for back pain.   Skin: Negative.    Allergic/Immunologic: Negative.    Neurological: Negative.    Hematological: Negative.    Psychiatric/Behavioral: Negative.    All other systems reviewed and are negative.      Past Medical History:   Diagnosis Date   • Acid reflux    • Arthritis    • Asthma    • Chronic bronchitis    • Colon polyp    • Diabetes    • Diverticulitis    • Fracture     lateral left ankle   • Gallstones    • Gout    • Hypertension    • Thyroid disease        No Known Allergies    Past Surgical History:   Procedure Laterality Date   •  SECTION     • CHOLECYSTECTOMY     • COLONOSCOPY  ,    DR LYLES,DR MARISCAL   • DILATATION AND CURETTAGE     • ENDOSCOPY      DR LOIDA ROBERTS   • FOOT SURGERY     •  HYSTERECTOMY         Family History   Problem Relation Age of Onset   • Arthritis Mother    • Mental illness Mother    • Migraines Mother    • Osteoporosis Mother    • Thyroid disease Mother    • Colon cancer Father    • Thyroid disease Sister        Social History     Social History   • Marital status:      Spouse name: N/A   • Number of children: N/A   • Years of education: N/A     Social History Main Topics   • Smoking status: Current Every Day Smoker   • Smokeless tobacco: None   • Alcohol use No   • Drug use: None   • Sexual activity: Not Asked     Other Topics Concern   • None     Social History Narrative           Objective   Physical Exam   Constitutional: She is oriented to person, place, and time. She appears well-developed and well-nourished. No distress.   HENT:   Head: Normocephalic and atraumatic.   Right Ear: External ear normal.   Left Ear: External ear normal.   Eyes: EOM are normal. Pupils are equal, round, and reactive to light.   Neck: Normal range of motion. Neck supple.   Cardiovascular: Normal rate, regular rhythm and normal heart sounds.    Pulmonary/Chest: Effort normal and breath sounds normal. No stridor. She has no wheezes. She exhibits no tenderness.   Abdominal: Soft. She exhibits no distension. There is no tenderness. There is no rebound and no guarding.   Bilateral CVA tenderness noted.   Musculoskeletal: Normal range of motion. She exhibits no edema.   Neurological: She is alert and oriented to person, place, and time.   Skin: Skin is warm and dry. No rash noted. She is not diaphoretic.   Psychiatric: She has a normal mood and affect. Her behavior is normal. Judgment and thought content normal.   Nursing note and vitals reviewed.      Procedures         ED Course  ED Course                  MDM  Number of Diagnoses or Management Options  Bilateral flank pain: new and requires workup     Amount and/or Complexity of Data Reviewed  Clinical lab tests: reviewed and ordered  Tests  in the radiology section of CPT®: ordered and reviewed  Discuss the patient with other providers: yes    Risk of Complications, Morbidity, and/or Mortality  Presenting problems: moderate  Diagnostic procedures: low  Management options: moderate  General comments: CT scan is negative except for fatty liver.  Urine is still pending at this time.  Clinically the patient has musculoskeletal back pain.  We will have her follow-up with her family doctor as soon as possible for further workup, treatment and evaluation.  Treat for musculoskeletal ack pain with meloxicam and Flexeril.        Final diagnoses:   Bilateral flank pain            Therese Boykin PA-C  11/03/17 0051

## 2017-11-06 ENCOUNTER — OFFICE VISIT (OUTPATIENT)
Dept: INTERNAL MEDICINE | Facility: CLINIC | Age: 47
End: 2017-11-06

## 2017-11-06 VITALS
WEIGHT: 260 LBS | BODY MASS INDEX: 44.39 KG/M2 | DIASTOLIC BLOOD PRESSURE: 78 MMHG | TEMPERATURE: 98.2 F | HEART RATE: 81 BPM | OXYGEN SATURATION: 95 % | SYSTOLIC BLOOD PRESSURE: 124 MMHG | HEIGHT: 64 IN

## 2017-11-06 DIAGNOSIS — R10.84 GENERALIZED ABDOMINAL PAIN: ICD-10-CM

## 2017-11-06 DIAGNOSIS — K85.90 ACUTE PANCREATITIS, UNSPECIFIED COMPLICATION STATUS, UNSPECIFIED PANCREATITIS TYPE: ICD-10-CM

## 2017-11-06 DIAGNOSIS — Z09 FOLLOW UP: Primary | ICD-10-CM

## 2017-11-06 DIAGNOSIS — M54.9 CHRONIC NECK AND BACK PAIN: ICD-10-CM

## 2017-11-06 DIAGNOSIS — M54.2 CHRONIC NECK AND BACK PAIN: ICD-10-CM

## 2017-11-06 DIAGNOSIS — G89.29 CHRONIC NECK AND BACK PAIN: ICD-10-CM

## 2017-11-06 DIAGNOSIS — Z23 NEED FOR INFLUENZA VACCINE: ICD-10-CM

## 2017-11-06 PROCEDURE — 99214 OFFICE O/P EST MOD 30 MIN: CPT | Performed by: NURSE PRACTITIONER

## 2017-11-06 PROCEDURE — 90686 IIV4 VACC NO PRSV 0.5 ML IM: CPT | Performed by: NURSE PRACTITIONER

## 2017-11-06 PROCEDURE — 90471 IMMUNIZATION ADMIN: CPT | Performed by: NURSE PRACTITIONER

## 2017-11-07 NOTE — PROGRESS NOTES
Chief Complaint / Reason:      Chief Complaint   Patient presents with   • Follow-up     pt presents for follow up on abdominal pain, wants mri on back and neck       Subjective     HPI  Patient presents today for ER follow up on abdominal pain, back and neck pain. She was seen 11/2/17 for bilateral flank pain, right greater than left and feels like they have progressively worsened. Denies urinary complaints, vomiting or diarrhea but does report some nausea.  No hematuria that she is aware of. History includes hysterectomy, chololestectomy, GERD, and uncontrolled diabetes but no history of kidney stones or constipation. Indicates pain is severe and sharp.  A CT scan of abdomen and pelvis was performed in the ER which did not identify any stones. Fatty liver was noted. Patient states pain is worse with movement. The patient reports that based on her symptoms and lab results she had acute pancreatitis. States the pain radiates to the back and vice versa. States she was miserable and since her ER visit pain has improved. Patient has chronic neck and back pain and indicates that she has decreased strength in upper extremities along with numbness and tingling and would like to further evaluate her symptoms.   History taken from: patient    PMH/FH/Social History were reviewed and updated appropriately in the electronic medical record.     Review of Systems:   Review of Systems   Constitutional: Positive for activity change and fatigue.   HENT:             Respiratory: Negative.    Cardiovascular: Negative.    Gastrointestinal: Positive for abdominal distention and abdominal pain.   Musculoskeletal: Positive for arthralgias, back pain, joint swelling, myalgias, neck pain and neck stiffness.   Neurological: Positive for weakness and headaches.     All other systems were reviewed and are negative.  Exceptions are noted in the subjective or above.      Objective     Vital Signs  Vitals:    11/06/17 1650   BP: 124/78    Pulse: 81   Temp: 98.2 °F (36.8 °C)   SpO2: 95%       Body mass index is 44.63 kg/(m^2).    Physical Exam   Constitutional: She is oriented to person, place, and time. She appears well-developed and well-nourished.   HENT:   Head: Normocephalic.   Eyes: Pupils are equal, round, and reactive to light.   Neck: Normal range of motion.   Cardiovascular: Normal rate, regular rhythm, normal heart sounds and intact distal pulses.    Pulmonary/Chest: Effort normal and breath sounds normal. She has no wheezes.   Abdominal: Soft. Bowel sounds are normal. There is generalized tenderness. There is no rebound, no guarding and no CVA tenderness.   Musculoskeletal: She exhibits tenderness.        Cervical back: She exhibits tenderness.        Thoracic back: She exhibits tenderness, bony tenderness, pain and spasm.   Neurological: She is alert and oriented to person, place, and time.   Skin: Skin is warm and dry.   Psychiatric: She has a normal mood and affect. Her behavior is normal. Judgment and thought content normal.   Nursing note and vitals reviewed.       Results Review:    I reviewed the patient's previous clinical results.   Results for orders placed during the hospital encounter of 11/02/17   CT Abdomen Pelvis Without Contrast    Narrative PROCEDURE: CT ABDOMEN PELVIS WO CONTRAST-     HISTORY: bilateral flank pain     COMPARISON: October 10, 2013.     PROCEDURE: Axial images were obtained from the lung bases through the  pubic symphysis without intravenous contrast.    .      FINDINGS:      ABDOMEN: The lung bases are clear. The heart size is normal. The limited  noncontrast images of the liver demonstrates diffuse hypodensity  consistent with fatty infiltration. No focal liver lesions are  identified. The patient is status post cholecystectomy. The spleen is  normal. No adrenal masses are seen.  The pancreas has an unremarkable  unenhanced appearance.. The aorta is normal in caliber. There is no  significant free fluid  or adenopathy.  There is no nephrolithiasis.  There is no hydronephrosis. Limited noncontrast images of the bowel are  unremarkable.     PELVIS: The appendix is normal. The patient is status post hysterectomy.  The urinary bladder is unremarkable. There is no significant fluid or  adenopathy.           Impression No hydronephrosis or nephrolithiasis.                2196.1 mGy.cm.  41.64 mGy     This study was performed with techniques to keep radiation doses as low  as reasonably achievable (ALARA). Individualized dose reduction  techniques using automated exposure control or adjustment of mA and/or  kV according to the patient size were employed.      This report was finalized on 11/3/2017 8:07 AM by Jordan Herrera M.D..     Admission on 11/02/2017, Discharged on 11/03/2017   Component Date Value Ref Range Status   • Glucose 11/02/2017 143* 74 - 98 mg/dL Final   • BUN 11/02/2017 14  7 - 20 mg/dL Final   • Creatinine 11/02/2017 0.80  0.60 - 1.30 mg/dL Final   • Sodium 11/02/2017 143  137 - 145 mmol/L Final   • Potassium 11/02/2017 4.0  3.5 - 5.1 mmol/L Final   • Chloride 11/02/2017 102  98 - 107 mmol/L Final   • CO2 11/02/2017 29.0  26.0 - 30.0 mmol/L Final   • Calcium 11/02/2017 9.7  8.4 - 10.2 mg/dL Final   • Total Protein 11/02/2017 7.6  6.3 - 8.2 g/dL Final   • Albumin 11/02/2017 4.20  3.50 - 5.00 g/dL Final   • ALT (SGPT) 11/02/2017 38  13 - 69 U/L Final   • AST (SGOT) 11/02/2017 27  15 - 46 U/L Final   • Alkaline Phosphatase 11/02/2017 93  38 - 126 U/L Final   • Total Bilirubin 11/02/2017 0.3  0.2 - 1.3 mg/dL Final   • eGFR Non African Amer 11/02/2017 76  >60 mL/min/1.73 Final   • Globulin 11/02/2017 3.4  gm/dL Final   • A/G Ratio 11/02/2017 1.2  1.0 - 2.0 g/dL Final   • BUN/Creatinine Ratio 11/02/2017 17.5  7.1 - 23.5 Final   • Anion Gap 11/02/2017 16.0  mmol/L Final   • Color, UA 11/03/2017 Yellow  Yellow, Straw Final   • Appearance, UA 11/03/2017 Slightly Cloudy* Clear Final   • pH, UA 11/03/2017 6.0   5.0 - 8.0 Final   • Specific Gravity, UA 11/03/2017 1.025  1.005 - 1.030 Final   • Glucose, UA 11/03/2017 Negative  Negative Final   • Ketones, UA 11/03/2017 Negative  Negative Final   • Bilirubin, UA 11/03/2017 Negative  Negative Final   • Blood, UA 11/03/2017 Negative  Negative Final   • Protein, UA 11/03/2017 Negative  Negative Final   • Leuk Esterase, UA 11/03/2017 Negative  Negative Final   • Nitrite, UA 11/03/2017 Negative  Negative Final   • Urobilinogen, UA 11/03/2017 1.0 E.U./dL  0.2 - 1.0 E.U./dL Final   • Lipase 11/02/2017 385* 23 - 300 U/L Final   • WBC 11/02/2017 13.04* 4.80 - 10.80 10*3/mm3 Final   • RBC 11/02/2017 5.52* 4.20 - 5.40 10*6/mm3 Final   • Hemoglobin 11/02/2017 15.1  12.0 - 16.0 g/dL Final   • Hematocrit 11/02/2017 46.3  37.0 - 47.0 % Final   • MCV 11/02/2017 83.9  81.0 - 99.0 fL Final   • MCH 11/02/2017 27.4  27.0 - 31.0 pg Final   • MCHC 11/02/2017 32.6  30.0 - 37.0 g/dL Final   • RDW 11/02/2017 14.5  11.5 - 14.5 % Final   • RDW-SD 11/02/2017 43.8  37.0 - 54.0 fl Final   • MPV 11/02/2017 10.1  6.0 - 12.0 fL Final   • Platelets 11/02/2017 256  130 - 400 10*3/mm3 Final   • Neutrophil % 11/02/2017 60.3  37.0 - 80.0 % Final   • Lymphocyte % 11/02/2017 30.0  10.0 - 50.0 % Final   • Monocyte % 11/02/2017 7.7  0.0 - 12.0 % Final   • Eosinophil % 11/02/2017 0.9  0.0 - 7.0 % Final   • Basophil % 11/02/2017 0.5  0.0 - 2.5 % Final   • Immature Grans % 11/02/2017 0.6  0.0 - 0.6 % Final   • Neutrophils, Absolute 11/02/2017 7.87* 2.00 - 6.90 10*3/mm3 Final   • Lymphocytes, Absolute 11/02/2017 3.91* 0.60 - 3.40 10*3/mm3 Final   • Monocytes, Absolute 11/02/2017 1.00* 0.00 - 0.90 10*3/mm3 Final   • Eosinophils, Absolute 11/02/2017 0.12  0.00 - 0.70 10*3/mm3 Final   • Basophils, Absolute 11/02/2017 0.06  0.00 - 0.20 10*3/mm3 Final   • Immature Grans, Absolute 11/02/2017 0.08* 0.00 - 0.06 10*3/mm3 Final   • nRBC 11/02/2017 0.0  0.0 - 0.0 /100 WBC Final         Medication Review:     Current Outpatient  Prescriptions:   •  albuterol (PROVENTIL HFA;VENTOLIN HFA) 108 (90 Base) MCG/ACT inhaler, Inhale 2 puffs Every 6 (Six) Hours As Needed for Wheezing or Shortness of Air., Disp: 1 inhaler, Rfl: 3  •  cyclobenzaprine (FLEXERIL) 10 MG tablet, Take 1 tablet by mouth At Night As Needed for Muscle Spasms., Disp: 10 tablet, Rfl: 0  •  famotidine (PEPCID) 20 MG tablet, Take 1 tablet by mouth 2 (Two) Times a Day., Disp: 60 tablet, Rfl: 1  •  gabapentin (NEURONTIN) 300 MG capsule, Take 1 capsule by mouth 3 (Three) Times a Day As Needed (numbness/burning pain)., Disp: 90 capsule, Rfl: 0  •  meloxicam (MOBIC) 15 MG tablet, Take 1 tablet by mouth Daily., Disp: 10 tablet, Rfl: 0  •  metFORMIN ER (GLUCOPHAGE-XR) 750 MG 24 hr tablet, Take 1 tablet by mouth Daily With Breakfast., Disp: 30 tablet, Rfl: 2  •  methocarbamol (ROBAXIN) 750 MG tablet, Take 1 tablet by mouth 3 (Three) Times a Day As Needed for Muscle Spasms., Disp: 90 tablet, Rfl: 3  •  ondansetron ODT (ZOFRAN ODT) 4 MG disintegrating tablet, Take 1 tablet by mouth Every 8 (Eight) Hours As Needed for Nausea or Vomiting., Disp: 12 tablet, Rfl: 0    Assessment/Plan   Georgia was seen today for follow-up.    Diagnoses and all orders for this visit:    Follow up    Chronic neck and back pain  -     MRI Cervical Spine Without Contrast  -     MRI Thoracic Spine Without Contrast  Recommend patient do stretches and avoid heavy lifting and over use   Need for influenza vaccine  -     Flu Vaccine Quad PF >18YR    Acute pancreatitis, unspecified complication status, unspecified pancreatitis type  Stable  Recommend diabetes to be better controlled and to prevent further complications.   Generalized abdominal pain  Discussed oral rehydration, reintroduction of solid foods, signs of dehydration.  Let your stomach settle. Stop eating solid foods for a few hours.  Try sucking on ice chips or taking small sips of water. You might also try drinking clear soda, clear broths or noncaffeinated  sports drinks. Drink plenty of liquid every day, taking small, frequent sips.  Ease back into eating. Gradually begin to eat bland, easy-to-digest foods, such as soda crackers, toast, gelatin, bananas, rice and chicken. Stop eating if your nausea returns.  Avoid certain foods and substances until you feel better. These include dairy products, caffeine, and fatty or highly seasoned foods.  Get plenty of rest. The illness and dehydration may have made you weak and tired.  Practice good hand hygiene.    Return in about 4 weeks (around 12/4/2017).    Najma Ziegler, APRN  11/06/2017

## 2017-11-20 ENCOUNTER — HOSPITAL ENCOUNTER (OUTPATIENT)
Dept: MRI IMAGING | Facility: HOSPITAL | Age: 47
Discharge: HOME | End: 2017-11-20

## 2017-11-20 ENCOUNTER — HOSPITAL ENCOUNTER (OUTPATIENT)
Dept: MRI IMAGING | Facility: HOSPITAL | Age: 47
Discharge: HOME | End: 2017-11-20
Admitting: NURSE PRACTITIONER

## 2017-11-20 PROCEDURE — 72146 MRI CHEST SPINE W/O DYE: CPT

## 2017-11-20 PROCEDURE — 72141 MRI NECK SPINE W/O DYE: CPT

## 2017-11-22 ENCOUNTER — TELEPHONE (OUTPATIENT)
Dept: INTERNAL MEDICINE | Facility: CLINIC | Age: 47
End: 2017-11-22

## 2017-11-22 DIAGNOSIS — G89.29 CHRONIC BACK PAIN, UNSPECIFIED BACK LOCATION, UNSPECIFIED BACK PAIN LATERALITY: Primary | ICD-10-CM

## 2017-11-22 DIAGNOSIS — M54.9 CHRONIC BACK PAIN, UNSPECIFIED BACK LOCATION, UNSPECIFIED BACK PAIN LATERALITY: Primary | ICD-10-CM

## 2017-11-22 NOTE — TELEPHONE ENCOUNTER
Patient called the office stating that she was off work and was checking to see if you had a chance to give her a call in regards to her results. I informed the patient that you was currently seeing patients and would contact her as soon as the imaging was resulted and ready to discuss.

## 2017-11-27 NOTE — TELEPHONE ENCOUNTER
Patient was contacted and informed of MRI results.  Referral to neurosurgery was placed.  I recommended physical therapy at this time.

## 2017-12-12 ENCOUNTER — OFFICE VISIT (OUTPATIENT)
Dept: NEUROSURGERY | Facility: CLINIC | Age: 47
End: 2017-12-12

## 2017-12-12 VITALS — BODY MASS INDEX: 42.68 KG/M2 | WEIGHT: 250 LBS | TEMPERATURE: 97 F | HEIGHT: 64 IN

## 2017-12-12 DIAGNOSIS — M51.34 DDD (DEGENERATIVE DISC DISEASE), THORACIC: ICD-10-CM

## 2017-12-12 DIAGNOSIS — M50.30 DDD (DEGENERATIVE DISC DISEASE), CERVICAL: Primary | ICD-10-CM

## 2017-12-12 DIAGNOSIS — M51.36 DDD (DEGENERATIVE DISC DISEASE), LUMBAR: ICD-10-CM

## 2017-12-12 DIAGNOSIS — M48.04 SPINAL STENOSIS OF THORACIC REGION: ICD-10-CM

## 2017-12-12 PROCEDURE — 99243 OFF/OP CNSLTJ NEW/EST LOW 30: CPT | Performed by: NEUROLOGICAL SURGERY

## 2017-12-12 NOTE — PROGRESS NOTES
Subjective   Patient ID: Karli Ricardo is a 47 y.o. female is being seen for consultation today at the request of Lovely Loyd MD  Chief Complaint: Back and neck pain    History of Present Illness: The patient is a 47-year-old woman who works as a  doing .  She has a history of an injury about 6 months ago when she was using a lawnmower [employer] clothesline.  She has pain in her neck and upper and mid back.  It bothers her when sitting too long at work all day.  Sometimes lying down helps.  She's had no physical therapy.  She complains of headache.  Some pain to her arms and legs.    Review of Radiographic Studies:  Cervical MRI scan is negative except for minor degenerative disc changes at the C5 6 and C6 7 level.  Thoracic MRI scan showed a moderate stenosis at T10 11 but no significant evidence of spinal cord compression.  There is several other levels where there are disc bulges such as T4-5 on the right, T8-9 on the right, and T11-T12 on the right, none of which produce nerve root or spinal cord compression.  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    The following portions of the patient's history were reviewed, updated as appropriate and approved: allergies, current medications, past family history, past medical history, past social history, past surgical history, review of systems and problem list.  Review of Systems   Constitutional: Negative for activity change, appetite change, chills, diaphoresis, fatigue, fever and unexpected weight change.   HENT: Negative for congestion, dental problem, drooling, ear discharge, ear pain, facial swelling, hearing loss, mouth sores, nosebleeds, postnasal drip, rhinorrhea, sinus pressure, sneezing, sore throat, tinnitus, trouble swallowing and voice change.    Eyes: Negative for photophobia, pain, discharge, redness, itching and visual disturbance.   Respiratory:  Negative for apnea, cough, choking, chest tightness, shortness of breath, wheezing and stridor.    Cardiovascular: Negative for chest pain, palpitations and leg swelling.   Gastrointestinal: Negative for abdominal distention, abdominal pain, anal bleeding, blood in stool, constipation, diarrhea, nausea, rectal pain and vomiting.   Endocrine: Negative for cold intolerance, heat intolerance, polydipsia, polyphagia and polyuria.   Genitourinary: Negative for decreased urine volume, difficulty urinating, dysuria, enuresis, flank pain, frequency, genital sores, hematuria and urgency.   Musculoskeletal: Positive for arthralgias, back pain, gait problem, joint swelling, myalgias, neck pain and neck stiffness.   Skin: Negative for color change, pallor, rash and wound.   Allergic/Immunologic: Negative for environmental allergies, food allergies and immunocompromised state.   Neurological: Positive for dizziness, facial asymmetry, weakness, light-headedness, numbness and headaches. Negative for tremors, seizures, syncope and speech difficulty.   Hematological: Negative for adenopathy. Does not bruise/bleed easily.   Psychiatric/Behavioral: Negative for agitation, behavioral problems, confusion, decreased concentration, dysphoric mood, hallucinations, self-injury, sleep disturbance and suicidal ideas. The patient is not nervous/anxious and is not hyperactive.    All other systems reviewed and are negative.      Objective     NEUROLOGICAL EXAMINATION:      MENTAL STATUS:  Alert and oriented.  Speech intact.  Recent and remote memory intact.      CRANIAL NERVES:  Cranial nerve II:  Visual fields are full to confrontation.  Cranial nerves III, IV and VI:  PERRLADC.  Extraocular movements are intact.  Nystagmus is not present.  Cranial nerve V:  Facial sensation is intact to light touch.  Cranial nerve VII:  Muscles of facial expression reveal no asymmetry.  Cranial nerve VIII:  Hearing is intact to finger rub  bilaterally.  Cranial nerves IX and X:  Palate elevates symmetrically.  Cranial nerve XI:  Shoulder shrug is intact.  Cranial nerve XII:  Tongue is midline without evidence of atrophy or fasciculation.    MUSCULOSKELETAL:  SLR negative. Sagar's test negative.  Height 5 feet 4 inches.  Weight 250 pounds.  BMI 43.    MOTOR:  Deltoid, biceps, triceps, and  strength intact.  No hand atrophy.  Hip flexion, knee extension, ankle dorsiflexion and plantar flexion intact. No tremor, spasticity, ataxia, or dysmetria.    SENSATION: Intact to touch upper and lower extremities.  Position sense intact.    REFLEXES:  DTR Intact and symmetrical in upper and lower extremities. Negative Babinski bilaterally    Assessment   Multilevel cervical degenerative disc C5 6, C6 7, no radiculopathy or myelopathy.  Multilevel thoracic degenerative disc/minimal herniation.  Asymptomatic moderate stenosis thoracic T10 11.  No myelopathy.       Plan   No indication found for surgery.  Physical therapy would likely be helpful and an order will be written.       Marcos Mcmullen MD

## 2018-07-17 ENCOUNTER — OFFICE VISIT (OUTPATIENT)
Dept: INTERNAL MEDICINE | Facility: CLINIC | Age: 48
End: 2018-07-17

## 2018-07-17 VITALS
DIASTOLIC BLOOD PRESSURE: 84 MMHG | BODY MASS INDEX: 36.54 KG/M2 | HEIGHT: 64 IN | SYSTOLIC BLOOD PRESSURE: 122 MMHG | TEMPERATURE: 98.1 F | WEIGHT: 214 LBS | OXYGEN SATURATION: 98 % | HEART RATE: 75 BPM

## 2018-07-17 DIAGNOSIS — B37.31 YEAST VAGINITIS: ICD-10-CM

## 2018-07-17 DIAGNOSIS — L30.4 INTERTRIGO: ICD-10-CM

## 2018-07-17 DIAGNOSIS — J45.20 MILD INTERMITTENT ASTHMA WITHOUT COMPLICATION: ICD-10-CM

## 2018-07-17 DIAGNOSIS — K44.9 HIATAL HERNIA: ICD-10-CM

## 2018-07-17 DIAGNOSIS — IMO0001 UNCONTROLLED TYPE 2 DIABETES MELLITUS WITHOUT COMPLICATION, WITHOUT LONG-TERM CURRENT USE OF INSULIN: Primary | ICD-10-CM

## 2018-07-17 DIAGNOSIS — R10.84 GENERALIZED ABDOMINAL PAIN: ICD-10-CM

## 2018-07-17 PROCEDURE — 99214 OFFICE O/P EST MOD 30 MIN: CPT | Performed by: PHYSICIAN ASSISTANT

## 2018-07-17 PROCEDURE — 82947 ASSAY GLUCOSE BLOOD QUANT: CPT | Performed by: PHYSICIAN ASSISTANT

## 2018-07-17 PROCEDURE — 83036 HEMOGLOBIN GLYCOSYLATED A1C: CPT | Performed by: PHYSICIAN ASSISTANT

## 2018-07-17 RX ORDER — BLOOD-GLUCOSE METER
1 KIT MISCELLANEOUS AS NEEDED
Qty: 1 EACH | Refills: 0 | Status: SHIPPED | OUTPATIENT
Start: 2018-07-17 | End: 2019-10-01

## 2018-07-17 RX ORDER — METFORMIN HYDROCHLORIDE 750 MG/1
750 TABLET, EXTENDED RELEASE ORAL
Qty: 30 TABLET | Refills: 5 | Status: SHIPPED | OUTPATIENT
Start: 2018-07-17 | End: 2019-01-07 | Stop reason: DRUGHIGH

## 2018-07-17 RX ORDER — GLIMEPIRIDE 2 MG/1
2 TABLET ORAL
Qty: 30 TABLET | Refills: 5 | Status: SHIPPED | OUTPATIENT
Start: 2018-07-17 | End: 2019-01-07

## 2018-07-17 RX ORDER — ONDANSETRON 4 MG/1
4 TABLET, ORALLY DISINTEGRATING ORAL EVERY 8 HOURS PRN
Qty: 12 TABLET | Refills: 0 | Status: CANCELLED | OUTPATIENT
Start: 2018-07-17

## 2018-07-17 RX ORDER — FAMOTIDINE 20 MG/1
20 TABLET, FILM COATED ORAL
Qty: 60 TABLET | Refills: 1 | Status: CANCELLED | OUTPATIENT
Start: 2018-07-17

## 2018-07-17 RX ORDER — PANTOPRAZOLE SODIUM 40 MG/1
40 TABLET, DELAYED RELEASE ORAL DAILY
Qty: 30 TABLET | Refills: 5 | Status: SHIPPED | OUTPATIENT
Start: 2018-07-17 | End: 2019-02-01 | Stop reason: SDUPTHER

## 2018-07-17 RX ORDER — FLUCONAZOLE 150 MG/1
150 TABLET ORAL DAILY
Qty: 2 TABLET | Refills: 0 | Status: SHIPPED | OUTPATIENT
Start: 2018-07-17 | End: 2018-11-01

## 2018-07-17 RX ORDER — KETOCONAZOLE 20 MG/G
CREAM TOPICAL DAILY
Qty: 30 G | Refills: 2 | Status: SHIPPED | OUTPATIENT
Start: 2018-07-17 | End: 2019-02-01

## 2018-07-17 RX ORDER — ALBUTEROL SULFATE 90 UG/1
2 AEROSOL, METERED RESPIRATORY (INHALATION) EVERY 6 HOURS PRN
Qty: 1 INHALER | Refills: 5 | Status: SHIPPED | OUTPATIENT
Start: 2018-07-17 | End: 2019-12-12 | Stop reason: SDUPTHER

## 2018-07-18 LAB
GLUCOSE BLDC GLUCOMTR-MCNC: 235 MG/DL (ref 70–130)
HBA1C MFR BLD: 11.3 %

## 2018-07-18 NOTE — PROGRESS NOTES
Subjective     Chief Complaint: elevated blood sugar    History of Present Illness     Karli Ricardo is a 48 y.o. female presenting with complaints of elevated blood sugars.  States at times her fasting sugars have ran in the mid to high 200s.  Patient is currently on metformin 750 mg XR.  States she's been working on diet and trying to exercise, has lost around 30 pounds over the past few months.  Last A1c in office was February 2017, 8.3.  POC A1c today was 11.3.  Patient needing diabetic eye exam, has not had one in several years.  Feels like her vision is worsening.  Denies numbness, tingling, burning to feet.    She also complains of rash to lower abdomen and groin areas.  States she's had trouble with yeast rashes in the past but this is much worse.  Feels that is because her sugar has been running higher.  Complains of vaginal yeast infection as well.    The following portions of the patient's history were reviewed and updated as appropriate: current medications, allergies, PMH.    Review of Systems   Constitutional: Negative for appetite change, chills, fatigue, fever and unexpected weight change.   HENT: Negative for congestion, ear pain, hearing loss, nosebleeds, sinus pressure, sore throat, tinnitus and trouble swallowing.    Eyes: Positive for visual disturbance. Negative for pain, discharge, redness and itching.   Respiratory: Negative for cough, chest tightness, shortness of breath and wheezing.    Cardiovascular: Negative for chest pain, palpitations and leg swelling.   Gastrointestinal: Negative for abdominal pain, blood in stool, constipation, diarrhea, nausea and vomiting.   Endocrine: Negative for cold intolerance, heat intolerance, polydipsia, polyphagia and polyuria.   Genitourinary: Negative for decreased urine volume, dysuria, flank pain, frequency and hematuria.   Musculoskeletal: Negative for arthralgias, back pain, gait problem, joint swelling, myalgias, neck pain and neck stiffness.  "  Skin: Positive for rash. Negative for color change.   Allergic/Immunologic: Negative for environmental allergies, food allergies and immunocompromised state.   Neurological: Negative for dizziness, syncope, weakness, light-headedness and headaches.   Hematological: Negative for adenopathy. Does not bruise/bleed easily.   Psychiatric/Behavioral: Negative for dysphoric mood, sleep disturbance and suicidal ideas. The patient is not nervous/anxious.      Objective     Vitals:    07/17/18 1552   BP: 122/84   Pulse: 75   Temp: 98.1 °F (36.7 °C)   SpO2: 98%   Weight: 97.1 kg (214 lb)   Height: 162.6 cm (64.02\")       Physical Exam   Constitutional: Appears well-developed and well-nourished.   Mouth/Throat: Oropharynx is clear and moist.   Eyes: EOM are normal. Pupils are equal, round, and reactive to light.   Neck: Normal range of motion. Neck supple.   Cardiovascular: Normal rate, regular rhythm.  Pulmonary/Chest: Effort normal and breath sounds normal.   Abdominal: Soft. Bowel sounds are normal.  Musculoskeletal: Normal range of motion.   Lymphadenopathy: No cervical adenopathy noted.   Neurological: Alert and oriented to person, place, and time.   Skin: Inflamed intertrigoareas.  Worst beneath abdomen and 2 and her thighs.    Psychiatric: Exhibits a normal mood and affect.     Assessment/Plan     Diagnoses and all orders for this visit:    Uncontrolled type 2 diabetes mellitus without complication, without long-term current use of insulin (CMS/[facility])  -     metFORMIN ER (GLUCOPHAGE-XR) 750 MG 24 hr tablet; Take 1 tablet by mouth Daily With Breakfast.  -     POCT Glucose  -     fluconazole (DIFLUCAN) 150 MG tablet; Take 1 tablet by mouth Daily. May repeat in 3 days if symptoms persist.  -     Comprehensive Metabolic Panel  -     Lipid Panel  -     Hemoglobin A1c  -     glimepiride (AMARYL) 2 MG tablet; Take 1 tablet by mouth Every Morning Before Breakfast.  -     glucose monitor monitoring kit; 1 each As Needed " (diabetes). Check sugars fasting and 2 hours after meals.  -     Ambulatory Referral for Diabetic Eye Exam-Optometry  -     POC Glycosylated Hemoglobin (Hb A1C)    Encouraged patient to continue working on diet and exercise.  We'll add on glimepiride daily.  Did not increase metformin as she feels she Thor has diarrhea from taking this, does not want worsening side effects.    Yeast vaginitis  -     fluconazole (DIFLUCAN) 150 MG tablet; Take 1 tablet by mouth Daily. May repeat in 3 days if symptoms persist.    Intertrigo  -     hydrocortisone 2.5 % cream; Apply  topically 2 (Two) Times a Day.  -     ketoconazole (NIZORAL) 2 % cream; Apply  topically Daily. (anti-fungal)    Hiatal hernia  -     pantoprazole (PROTONIX) 40 MG EC tablet; Take 1 tablet by mouth Daily.    Mild intermittent asthma without complication  -     albuterol (PROVENTIL HFA;VENTOLIN HFA) 108 (90 Base) MCG/ACT inhaler; Inhale 2 puffs Every 6 (Six) Hours As Needed for Wheezing or Shortness of Air.    Follow-up in 6 weeks.    Jeimy Capellan PA-C  07/17/2018         Please note that portions of this note were completed with a voice recognition program. Efforts were made to edit dictation, but occasionally words are mistranscribed.

## 2018-07-19 ENCOUNTER — TELEPHONE (OUTPATIENT)
Dept: INTERNAL MEDICINE | Facility: CLINIC | Age: 48
End: 2018-07-19

## 2018-07-20 RX ORDER — LANCETS
EACH MISCELLANEOUS
Qty: 100 EACH | Refills: 3 | Status: SHIPPED | OUTPATIENT
Start: 2018-07-20 | End: 2019-02-14 | Stop reason: ALTCHOICE

## 2018-08-01 LAB
ALBUMIN SERPL-MCNC: 3.9 G/DL (ref 3.5–5)
ALBUMIN/GLOB SERPL: 1.2 G/DL (ref 1–2)
ALP SERPL-CCNC: 114 U/L (ref 38–126)
ALT SERPL-CCNC: 51 U/L (ref 13–69)
AMYLASE SERPL-CCNC: 54 U/L (ref 30–110)
AST SERPL-CCNC: 62 U/L (ref 15–46)
BILIRUB SERPL-MCNC: 0.6 MG/DL (ref 0.2–1.3)
BUN SERPL-MCNC: 9 MG/DL (ref 7–20)
BUN/CREAT SERPL: 12.9 (ref 7.1–23.5)
CALCIUM SERPL-MCNC: 9.7 MG/DL (ref 8.4–10.2)
CHLORIDE SERPL-SCNC: 104 MMOL/L (ref 98–107)
CHOLEST SERPL-MCNC: 168 MG/DL (ref 0–199)
CO2 SERPL-SCNC: 28 MMOL/L (ref 26–30)
CREAT SERPL-MCNC: 0.7 MG/DL (ref 0.6–1.3)
GLOBULIN SER CALC-MCNC: 3.3 GM/DL
GLUCOSE SERPL-MCNC: 138 MG/DL (ref 74–98)
HBA1C MFR BLD: 10.6 %
HDLC SERPL-MCNC: 29 MG/DL (ref 40–60)
LDLC SERPL CALC-MCNC: 97 MG/DL (ref 0–99)
LIPASE SERPL-CCNC: 154 U/L (ref 23–300)
POTASSIUM SERPL-SCNC: 4.3 MMOL/L (ref 3.5–5.1)
PROT SERPL-MCNC: 7.2 G/DL (ref 6.3–8.2)
SODIUM SERPL-SCNC: 141 MMOL/L (ref 137–145)
TRIGL SERPL-MCNC: 210 MG/DL
VLDLC SERPL CALC-MCNC: 42 MG/DL

## 2018-11-01 ENCOUNTER — OFFICE VISIT (OUTPATIENT)
Dept: INTERNAL MEDICINE | Facility: CLINIC | Age: 48
End: 2018-11-01

## 2018-11-01 VITALS
WEIGHT: 224.5 LBS | OXYGEN SATURATION: 98 % | RESPIRATION RATE: 16 BRPM | HEART RATE: 70 BPM | HEIGHT: 64 IN | DIASTOLIC BLOOD PRESSURE: 80 MMHG | TEMPERATURE: 97.7 F | SYSTOLIC BLOOD PRESSURE: 126 MMHG | BODY MASS INDEX: 38.33 KG/M2

## 2018-11-01 DIAGNOSIS — H65.93 FLUID LEVEL BEHIND TYMPANIC MEMBRANE OF BOTH EARS: ICD-10-CM

## 2018-11-01 DIAGNOSIS — J01.00 ACUTE NON-RECURRENT MAXILLARY SINUSITIS: Primary | ICD-10-CM

## 2018-11-01 DIAGNOSIS — L29.8 PRURITIC ERYTHEMATOUS RASH: ICD-10-CM

## 2018-11-01 PROCEDURE — 99213 OFFICE O/P EST LOW 20 MIN: CPT | Performed by: FAMILY MEDICINE

## 2018-11-01 RX ORDER — NYSTATIN AND TRIAMCINOLONE ACETONIDE 100000; 1 [USP'U]/G; MG/G
OINTMENT TOPICAL EVERY 12 HOURS SCHEDULED
Qty: 60 G | Refills: 2 | Status: SHIPPED | OUTPATIENT
Start: 2018-11-01 | End: 2019-02-01

## 2018-11-01 RX ORDER — CEFDINIR 300 MG/1
300 CAPSULE ORAL 2 TIMES DAILY
Qty: 20 CAPSULE | Refills: 0 | Status: SHIPPED | OUTPATIENT
Start: 2018-11-01 | End: 2018-11-11

## 2018-11-01 RX ORDER — BENZONATATE 200 MG/1
200 CAPSULE ORAL 3 TIMES DAILY PRN
Qty: 30 CAPSULE | Refills: 0 | Status: SHIPPED | OUTPATIENT
Start: 2018-11-01 | End: 2019-02-14 | Stop reason: SDUPTHER

## 2018-11-01 NOTE — PROGRESS NOTES
Karli Ricardo is a 48 y.o. female.    Chief Complaint   Patient presents with   • Earache     started in left ear, now both. Right more painful now. Has used sweet oil in left and this seemed to help some.   • Nasal Congestion     expelling bloody mucus when blowing nose. X 3-4 days   • Cough     X 3-4 days   • Rash     place on right lower leg       HPI   Patient reports they have not been feeling well for 4day(s). She admits to rhinorrhea, ear pain, sore throat, headache, sneezing, productive cough.  She denies nasal congestion, fever.  They have tried OTC medication for this issue with minimal response.    Patient reports a rash to her right lower leg.  It is a round, dry, itchy, red patch.  She has tried multiple topical creams and ointments without response.  She has had this rash for almost 2 years.  She has seen dermatology in the past for another similar lesion.     The following portions of the patient's history were reviewed and updated as appropriate: allergies, current medications, past family history, past medical history, past social history, past surgical history and problem list.     No Known Allergies      Current Outpatient Prescriptions:   •  albuterol (PROVENTIL HFA;VENTOLIN HFA) 108 (90 Base) MCG/ACT inhaler, Inhale 2 puffs Every 6 (Six) Hours As Needed for Wheezing or Shortness of Air., Disp: 1 inhaler, Rfl: 5  •  gabapentin (NEURONTIN) 300 MG capsule, Take 1 capsule by mouth 3 (Three) Times a Day As Needed (numbness/burning pain)., Disp: 90 capsule, Rfl: 0  •  glimepiride (AMARYL) 2 MG tablet, Take 1 tablet by mouth Every Morning Before Breakfast., Disp: 30 tablet, Rfl: 5  •  glucose blood test strip, TEST DAILY E11.9, Disp: 100 each, Rfl: 12  •  glucose monitor monitoring kit, 1 each As Needed (diabetes). Check sugars fasting and 2 hours after meals., Disp: 1 each, Rfl: 0  •  hydrocortisone 2.5 % cream, Apply  topically 2 (Two) Times a Day., Disp: 30 g, Rfl: 0  •  ketoconazole  "(NIZORAL) 2 % cream, Apply  topically Daily. (anti-fungal), Disp: 30 g, Rfl: 2  •  Lancets (ONETOUCH ULTRASOFT) lancets, Use 1-3 x daily, Disp: 100 each, Rfl: 3  •  metFORMIN ER (GLUCOPHAGE-XR) 750 MG 24 hr tablet, Take 1 tablet by mouth Daily With Breakfast., Disp: 30 tablet, Rfl: 5  •  pantoprazole (PROTONIX) 40 MG EC tablet, Take 1 tablet by mouth Daily., Disp: 30 tablet, Rfl: 5  •  benzonatate (TESSALON) 200 MG capsule, Take 1 capsule by mouth 3 (Three) Times a Day As Needed for Cough., Disp: 30 capsule, Rfl: 0  •  cefdinir (OMNICEF) 300 MG capsule, Take 1 capsule by mouth 2 (Two) Times a Day for 10 days., Disp: 20 capsule, Rfl: 0  •  nystatin-triamcinolone (MYCOLOG) 958639-3.1 UNIT/GM-% ointment, Apply  topically to the appropriate area as directed Every 12 (Twelve) Hours., Disp: 60 g, Rfl: 2  •  ondansetron ODT (ZOFRAN ODT) 4 MG disintegrating tablet, Take 1 tablet by mouth Every 8 (Eight) Hours As Needed for Nausea or Vomiting., Disp: 12 tablet, Rfl: 0    ROS    Review of Systems   Constitutional: Negative for chills, fatigue and fever.   HENT: Positive for postnasal drip, rhinorrhea, sneezing and sore throat. Negative for congestion.    Respiratory: Positive for cough. Negative for shortness of breath and wheezing.    Cardiovascular: Negative for chest pain and leg swelling.   Gastrointestinal: Negative for abdominal pain, constipation, diarrhea, nausea and vomiting.   Skin: Positive for rash. Negative for color change.   Neurological: Positive for headache. Negative for weakness and numbness.       Vitals:    11/01/18 1609   BP: 126/80   Pulse: 70   Resp: 16   Temp: 97.7 °F (36.5 °C)   SpO2: 98%   Weight: 102 kg (224 lb 8 oz)   Height: 162.6 cm (64.02\")     Body mass index is 38.51 kg/m².    Physical Exam     Physical Exam   Constitutional: She is oriented to person, place, and time. She appears well-developed and well-nourished. No distress.   HENT:   Head: Normocephalic and atraumatic.   Right Ear: " External ear normal. Tympanic membrane is erythematous. A middle ear effusion is present.   Left Ear: External ear normal. Tympanic membrane is erythematous. A middle ear effusion is present.   Nose: Right sinus exhibits maxillary sinus tenderness. Left sinus exhibits maxillary sinus tenderness.   Mouth/Throat: Posterior oropharyngeal erythema present.   Eyes: Conjunctivae and EOM are normal.   Neck: Normal range of motion. Neck supple.   Cardiovascular: Normal rate and regular rhythm.    No murmur heard.  Pulmonary/Chest: Effort normal and breath sounds normal. No respiratory distress. She has no wheezes.   Abdominal: Soft. Bowel sounds are normal. She exhibits no distension. There is tenderness.   Lymphadenopathy:     She has no cervical adenopathy.   Neurological: She is alert and oriented to person, place, and time. No cranial nerve deficit.   Skin: Skin is warm and dry. Rash (2cm diameter round, dry, erythematous rash to right laterl lower leg) noted.   Psychiatric: She has a normal mood and affect. Her behavior is normal.       Assessment/Plan    Problem List Items Addressed This Visit     Pruritic erythematous rash     Patient has tried multiple antifungals and some mild topical steroids.  Will try mycolog.  If no response discussed with patient she may need to see dermatology again.          Relevant Medications    nystatin-triamcinolone (MYCOLOG) 655485-9.1 UNIT/GM-% ointment    Acute non-recurrent maxillary sinusitis - Primary     Will treat with omnicef.  Patient encouraged to use neti pot.  May take tessalon perles for cough.          Fluid level behind tympanic membrane of both ears     Ears do have effusion present and are beginning to turn red.  Omnicef should cover for ear infection as well.  They do not appear infected at this time.                New Medications Ordered This Visit   Medications   • nystatin-triamcinolone (MYCOLOG) 285786-7.1 UNIT/GM-% ointment     Sig: Apply  topically to the  appropriate area as directed Every 12 (Twelve) Hours.     Dispense:  60 g     Refill:  2     May separate into individual creams if necessary.   • cefdinir (OMNICEF) 300 MG capsule     Sig: Take 1 capsule by mouth 2 (Two) Times a Day for 10 days.     Dispense:  20 capsule     Refill:  0   • benzonatate (TESSALON) 200 MG capsule     Sig: Take 1 capsule by mouth 3 (Three) Times a Day As Needed for Cough.     Dispense:  30 capsule     Refill:  0       No orders of the defined types were placed in this encounter.      Return if symptoms worsen or fail to improve.    Cherise Jones, DO

## 2018-11-01 NOTE — ASSESSMENT & PLAN NOTE
Ears do have effusion present and are beginning to turn red.  Omnicef should cover for ear infection as well.  They do not appear infected at this time.

## 2018-11-01 NOTE — ASSESSMENT & PLAN NOTE
Patient has tried multiple antifungals and some mild topical steroids.  Will try mycolog.  If no response discussed with patient she may need to see dermatology again.

## 2018-11-01 NOTE — ASSESSMENT & PLAN NOTE
Will treat with omnicef.  Patient encouraged to use neti pot.  May take tessalon perles for cough.

## 2018-11-07 ENCOUNTER — TELEPHONE (OUTPATIENT)
Dept: INTERNAL MEDICINE | Facility: CLINIC | Age: 48
End: 2018-11-07

## 2018-11-08 RX ORDER — FLUCONAZOLE 150 MG/1
TABLET ORAL
Qty: 2 TABLET | Refills: 0 | Status: SHIPPED | OUTPATIENT
Start: 2018-11-08 | End: 2018-12-12

## 2018-12-12 ENCOUNTER — OFFICE VISIT (OUTPATIENT)
Dept: INTERNAL MEDICINE | Facility: CLINIC | Age: 48
End: 2018-12-12

## 2018-12-12 VITALS
WEIGHT: 252.75 LBS | RESPIRATION RATE: 16 BRPM | SYSTOLIC BLOOD PRESSURE: 126 MMHG | HEIGHT: 64 IN | DIASTOLIC BLOOD PRESSURE: 82 MMHG | HEART RATE: 78 BPM | TEMPERATURE: 97.8 F | OXYGEN SATURATION: 96 % | BODY MASS INDEX: 43.15 KG/M2

## 2018-12-12 DIAGNOSIS — Z23 NEED FOR HEPATITIS A VACCINATION: ICD-10-CM

## 2018-12-12 DIAGNOSIS — Z72.0 TOBACCO ABUSE: ICD-10-CM

## 2018-12-12 DIAGNOSIS — K14.8 TONGUE LESION: ICD-10-CM

## 2018-12-12 DIAGNOSIS — J02.9 SORE THROAT: Primary | ICD-10-CM

## 2018-12-12 PROCEDURE — 96372 THER/PROPH/DIAG INJ SC/IM: CPT | Performed by: FAMILY MEDICINE

## 2018-12-12 PROCEDURE — 99214 OFFICE O/P EST MOD 30 MIN: CPT | Performed by: FAMILY MEDICINE

## 2018-12-12 PROCEDURE — 87880 STREP A ASSAY W/OPTIC: CPT | Performed by: FAMILY MEDICINE

## 2018-12-12 RX ORDER — FLUCONAZOLE 150 MG/1
TABLET ORAL
Qty: 2 TABLET | Refills: 1 | Status: SHIPPED | OUTPATIENT
Start: 2018-12-12 | End: 2019-01-07 | Stop reason: SDUPTHER

## 2018-12-12 RX ORDER — CEFTRIAXONE 1 G/1
1 INJECTION, POWDER, FOR SOLUTION INTRAMUSCULAR; INTRAVENOUS ONCE
Status: COMPLETED | OUTPATIENT
Start: 2018-12-12 | End: 2018-12-12

## 2018-12-12 RX ADMIN — CEFTRIAXONE 1 G: 1 INJECTION, POWDER, FOR SOLUTION INTRAMUSCULAR; INTRAVENOUS at 07:46

## 2018-12-13 LAB
EXPIRATION DATE: NORMAL
INTERNAL CONTROL: NORMAL
Lab: NORMAL
S PYO AG THROAT QL: NEGATIVE

## 2018-12-13 PROCEDURE — 90632 HEPA VACCINE ADULT IM: CPT | Performed by: FAMILY MEDICINE

## 2018-12-13 PROCEDURE — 90471 IMMUNIZATION ADMIN: CPT | Performed by: FAMILY MEDICINE

## 2018-12-14 PROBLEM — R10.829: Status: RESOLVED | Noted: 2017-02-07 | Resolved: 2018-12-14

## 2018-12-14 PROBLEM — M62.838 MUSCLE SPASM: Status: RESOLVED | Noted: 2017-04-28 | Resolved: 2018-12-14

## 2018-12-14 PROBLEM — L29.8 PRURITIC ERYTHEMATOUS RASH: Status: RESOLVED | Noted: 2017-02-15 | Resolved: 2018-12-14

## 2018-12-14 PROBLEM — R06.2 WHEEZING: Status: RESOLVED | Noted: 2017-04-28 | Resolved: 2018-12-14

## 2018-12-14 PROBLEM — R10.9 RECURRENT ABDOMINAL PAIN: Status: RESOLVED | Noted: 2017-04-03 | Resolved: 2018-12-14

## 2018-12-14 PROBLEM — L29.89 PRURITIC ERYTHEMATOUS RASH: Status: RESOLVED | Noted: 2017-02-15 | Resolved: 2018-12-14

## 2018-12-14 PROBLEM — J01.00 ACUTE NON-RECURRENT MAXILLARY SINUSITIS: Status: RESOLVED | Noted: 2018-11-01 | Resolved: 2018-12-14

## 2018-12-14 PROBLEM — M62.838 NECK MUSCLE SPASM: Status: RESOLVED | Noted: 2017-04-03 | Resolved: 2018-12-14

## 2018-12-14 PROBLEM — Z72.0 TOBACCO ABUSE: Status: ACTIVE | Noted: 2018-12-14

## 2018-12-14 NOTE — PROGRESS NOTES
Subjective    Karli Ricardo is a 48 y.o. female here for:  Chief Complaint   Patient presents with   • Sore Throat     x 4 days    • Diarrhea     x 2 days comes and goes    • Earache     right ear is slightly worse than left        Sore Throat    This is a new problem. The current episode started in the past 7 days. The problem has been gradually worsening. There has been no fever. Associated symptoms include diarrhea and ear pain. Pertinent negatives include no ear discharge. Treatments tried: antibiotic. The treatment provided mild relief.   Diarrhea    This is a new problem. The current episode started yesterday. The problem has been waxing and waning. Associated symptoms include a URI. Pertinent negatives include no fever. There are no known risk factors.   Earache    There is pain in both ears. This is a new problem. The current episode started in the past 7 days. The problem has been waxing and waning. There has been no fever. Associated symptoms include diarrhea and a sore throat. Pertinent negatives include no ear discharge. She has tried antibiotics for the symptoms. The treatment provided mild relief.      Tongue lesion for months. Very bothersome. It is on the left side of tongue, present since a dental procedure.    The following portions of the patient's history were reviewed and updated as appropriate: allergies, current medications, past family history, past medical history, past social history, past surgical history and problem list.    Health Maintenance   Topic Date Due   • ANNUAL PHYSICAL  06/24/1971   • TDAP/TD VACCINES (1 - Tdap) 06/24/1987   • DIABETIC EYE EXAM  09/13/2016   • MAMMOGRAM  10/14/2016   • DIABETIC FOOT EXAM  04/03/2018   • URINE MICROALBUMIN  04/03/2018   • ZOSTER VACCINE (1 of 2) 06/24/2018   • INFLUENZA VACCINE  08/01/2018   • HEMOGLOBIN A1C  02/01/2019   • HEPATITIS A VACCINE ADULT (2 of 2) 06/13/2019   • COLONOSCOPY  06/01/2026   • PNEUMOCOCCAL VACCINE (19-64 MEDIUM  "RISK)  Completed   • PAP SMEAR  Discontinued       Review of Systems   Constitutional: Negative for fever.   HENT: Positive for ear pain and sore throat. Negative for ear discharge.    Gastrointestinal: Positive for diarrhea.       Vitals:    12/12/18 1338   BP: 126/82   Pulse: 78   Resp: 16   Temp: 97.8 °F (36.6 °C)   TempSrc: Temporal   SpO2: 96%   Weight: 115 kg (252 lb 12 oz)   Height: 162.6 cm (64.02\")         Objective   Physical Exam   Constitutional: She is oriented to person, place, and time. Vital signs are normal. She appears well-developed and well-nourished. She is active.  Non-toxic appearance. She appears ill. No distress. She is morbidly obese.  HENT:   Head: Normocephalic and atraumatic.   Right Ear: Hearing normal.   Left Ear: Hearing normal.   Mouth/Throat: Mucous membranes are not dry.       Eyes: EOM are normal. No scleral icterus.   Neck: Phonation normal. Neck supple.   Cardiovascular: Normal rate, regular rhythm and normal heart sounds.   Pulmonary/Chest: Effort normal and breath sounds normal.   Neurological: She is alert and oriented to person, place, and time. She displays no tremor. No cranial nerve deficit.   Skin: Skin is warm. No rash noted. She is not diaphoretic. No pallor.   Psychiatric: She has a normal mood and affect. Her speech is normal and behavior is normal. Judgment and thought content normal. Cognition and memory are normal.   Nursing note and vitals reviewed.          Lab Results   Component Value Date    RAPSCRN Negative 12/13/2018         Assessment/Plan     Problem List Items Addressed This Visit        Other    Tobacco abuse    Relevant Orders    Ambulatory Referral to ENT (Otolaryngology)      Other Visit Diagnoses     Sore throat    -  Primary    Relevant Medications    cefTRIAXone (ROCEPHIN) injection 1 g (Completed)    fluconazole (DIFLUCAN) 150 MG tablet    Other Relevant Orders    POC Rapid Strep A (Completed)    Tongue lesion        Relevant Orders    Ambulatory " Referral to ENT (Otolaryngology)    Need for hepatitis A vaccination        Relevant Orders    Hepatitis A Vaccine Adult IM (Completed)          · Recurrent infection, did not respond to previous oral antibiotic. She requested an injection today.  · May need to change referral to oral surgery if ENT unable to accommodate issue    Return in about 4 weeks (around 1/9/2019) for Diabetes follow up.    Lovely Loyd MD

## 2019-01-07 ENCOUNTER — OFFICE VISIT (OUTPATIENT)
Dept: INTERNAL MEDICINE | Facility: CLINIC | Age: 49
End: 2019-01-07

## 2019-01-07 VITALS
HEIGHT: 64 IN | RESPIRATION RATE: 16 BRPM | SYSTOLIC BLOOD PRESSURE: 126 MMHG | TEMPERATURE: 97.2 F | HEART RATE: 81 BPM | OXYGEN SATURATION: 98 % | WEIGHT: 247 LBS | DIASTOLIC BLOOD PRESSURE: 80 MMHG | BODY MASS INDEX: 42.17 KG/M2

## 2019-01-07 DIAGNOSIS — E11.65 TYPE 2 DIABETES MELLITUS WITH HYPERGLYCEMIA, WITHOUT LONG-TERM CURRENT USE OF INSULIN (HCC): Primary | ICD-10-CM

## 2019-01-07 DIAGNOSIS — B37.31 YEAST INFECTION INVOLVING THE VAGINA AND SURROUNDING AREA: ICD-10-CM

## 2019-01-07 DIAGNOSIS — Z23 NEED FOR INFLUENZA VACCINATION: ICD-10-CM

## 2019-01-07 DIAGNOSIS — E66.01 MORBID OBESITY (HCC): ICD-10-CM

## 2019-01-07 LAB
GLUCOSE BLDC GLUCOMTR-MCNC: 291 MG/DL (ref 70–130)
HBA1C MFR BLD: 11.8 %

## 2019-01-07 PROCEDURE — 99214 OFFICE O/P EST MOD 30 MIN: CPT | Performed by: FAMILY MEDICINE

## 2019-01-07 PROCEDURE — 83036 HEMOGLOBIN GLYCOSYLATED A1C: CPT | Performed by: FAMILY MEDICINE

## 2019-01-07 PROCEDURE — 82962 GLUCOSE BLOOD TEST: CPT | Performed by: FAMILY MEDICINE

## 2019-01-07 PROCEDURE — 90674 CCIIV4 VAC NO PRSV 0.5 ML IM: CPT | Performed by: FAMILY MEDICINE

## 2019-01-07 PROCEDURE — 90471 IMMUNIZATION ADMIN: CPT | Performed by: FAMILY MEDICINE

## 2019-01-07 RX ORDER — METFORMIN HYDROCHLORIDE 500 MG/1
1000 TABLET, EXTENDED RELEASE ORAL
Qty: 180 TABLET | Refills: 4 | Status: SHIPPED | OUTPATIENT
Start: 2019-01-07 | End: 2019-10-09 | Stop reason: SDUPTHER

## 2019-01-07 RX ORDER — FLUCONAZOLE 150 MG/1
TABLET ORAL
Qty: 2 TABLET | Refills: 3 | Status: SHIPPED | OUTPATIENT
Start: 2019-01-07 | End: 2019-02-01 | Stop reason: SDUPTHER

## 2019-01-07 NOTE — PROGRESS NOTES
"Subjective    Georgia Shameka Ricardo is a 48 y.o. female here for:  Chief Complaint   Patient presents with   • Diabetes     blood sugar has been up since thursday has gotten up as high as 600     Diabetes   She presents for her follow-up diabetic visit. She has type 2 diabetes mellitus. Her disease course has been worsening. Associated symptoms include fatigue, foot paresthesias, polydipsia and polyuria. Diabetic complications include peripheral neuropathy. (Vaginal yeast infections, recurrent.) Risk factors for coronary artery disease include obesity, diabetes mellitus, sedentary lifestyle, post-menopausal, stress and hypertension. Current diabetic treatment includes oral agent (dual therapy). She is compliant with treatment most of the time. Her weight is decreasing steadily. Her home blood glucose trend is increasing steadily. (Max >600 last week.) An ACE inhibitor/angiotensin II receptor blocker is not being taken.   Obesity   This is a chronic problem. The current episode started more than 1 year ago. The problem occurs daily. The problem has been waxing and waning. Associated symptoms include abdominal pain and fatigue. Exacerbated by: diabetes. Treatments tried: metformin. The treatment provided no relief.          The following portions of the patient's history were reviewed and updated as appropriate: allergies, current medications, past family history, past medical history, past social history, past surgical history and problem list.    Review of Systems   Constitutional: Positive for activity change and fatigue.   Gastrointestinal: Positive for abdominal pain.   Endocrine: Positive for polydipsia and polyuria.       Vitals:    01/07/19 0924   BP: 126/80   Pulse: 81   Resp: 16   Temp: 97.2 °F (36.2 °C)   TempSrc: Temporal   SpO2: 98%   Weight: 112 kg (247 lb)   Height: 162.6 cm (64.02\")         Objective   Physical Exam   Constitutional: She is oriented to person, place, and time. Vital signs are normal. " She appears well-developed and well-nourished. She is active.  Non-toxic appearance. She does not have a sickly appearance. She does not appear ill. No distress. She is obese.  HENT:   Head: Normocephalic and atraumatic. Hair is normal.   Right Ear: Hearing and external ear normal.   Left Ear: Hearing and external ear normal.   Nose: Nose normal.   Mouth/Throat: Mucous membranes are not dry. No trismus in the jaw.   Eyes: Conjunctivae, EOM and lids are normal. Pupils are equal, round, and reactive to light. No scleral icterus.   Neck: Phonation normal. Neck supple. No tracheal deviation present.   Cardiovascular: Normal rate, regular rhythm and normal heart sounds.   No murmur heard.  Pulmonary/Chest: Effort normal and breath sounds normal.   Musculoskeletal: She exhibits no edema or deformity.   Neurological: She is alert and oriented to person, place, and time. She displays no tremor. No cranial nerve deficit. She exhibits normal muscle tone. Gait normal.   Skin: Skin is warm. Turgor is normal. No rash noted. She is not diaphoretic. No cyanosis. No pallor. Nails show no clubbing.   Psychiatric: She has a normal mood and affect. Her speech is normal and behavior is normal. Judgment and thought content normal. Cognition and memory are normal.   Nursing note and vitals reviewed.      Lab Results   Component Value Date    HGBA1C 10.60 08/01/2018       Office Visit on 01/07/2019   Component Date Value Ref Range Status   • Hemoglobin A1C 01/07/2019 11.8  % Final   • Glucose 01/07/2019 291* 70 - 130 mg/dL Final         Assessment/Plan     Problem List Items Addressed This Visit        Endocrine    Type 2 diabetes mellitus with hyperglycemia, without long-term current use of insulin (CMS/[facility]) - Primary    Relevant Medications    Insulin Glargine-Lixisenatide 100-33 UNT-MCG/ML solution pen-injector    Insulin Glargine-Lixisenatide (SOLIQUA) 100-33 UNT-MCG/ML solution pen-injector    metFORMIN ER (GLUCOPHAGE XR) 500 MG 24  hr tablet    Insulin Pen Needle (BD PEN NEEDLE LAURITA U/F) 32G X 4 MM misc    Other Relevant Orders    POC Glycosylated Hemoglobin (Hb A1C) (Completed)    POCT Glucose (Completed)      Other Visit Diagnoses     Morbid obesity (CMS/HCC)        Yeast infection involving the vagina and surrounding area        Relevant Medications    fluconazole (DIFLUCAN) 150 MG tablet    Need for influenza vaccination        Relevant Orders    Flucelvax Quad=>4Years ([id number])          · Patient's Body mass index is 42.38 kg/m². BMI is above normal parameters. Recommendations include: pharmacological intervention.  · Discontinue sulfonylurea.  Added new injection daily, discussed weight loss that she may see with this new medicine.  Increased metformin to 1000 mg daily (extended release)  · Needs to follow-up with me in 1-2 weeks with her blood glucose log, a book was given to her to use and I explained how I need fasting sugars to adjust her insulin dose.  · Recurrent yeast infections due to hyperglycemia, should improve with improvement diabetes    Return in about 1 week (around 1/14/2019).    Lovely Loyd MD

## 2019-01-09 ENCOUNTER — PRIOR AUTHORIZATION (OUTPATIENT)
Dept: INTERNAL MEDICINE | Facility: CLINIC | Age: 49
End: 2019-01-09

## 2019-01-09 ENCOUNTER — TELEPHONE (OUTPATIENT)
Dept: INTERNAL MEDICINE | Facility: CLINIC | Age: 49
End: 2019-01-09

## 2019-01-09 NOTE — TELEPHONE ENCOUNTER
Patient is requesting a phone call to discuss her new insulin medication.  She took first dose yesterday, the 8th of January, 2019, and her level was still 300 this a.m.    Please do not call until after 3:00 pm as patient works late shift.

## 2019-01-10 ENCOUNTER — TELEPHONE (OUTPATIENT)
Dept: INTERNAL MEDICINE | Facility: CLINIC | Age: 49
End: 2019-01-10

## 2019-01-10 NOTE — TELEPHONE ENCOUNTER
Patient called the office stating that her BS has not been under 300 since she was seen on 1/07/19. (She has only been taking Soliqua for 2 days).  Please advise any changes or if you would like to see patient in office.  Thanks

## 2019-01-11 NOTE — TELEPHONE ENCOUNTER
She was started on the lowest dose of that medicine, and with only 2 days of use I would not expect her to be at goal. May go up to 18 units.

## 2019-02-01 ENCOUNTER — OFFICE VISIT (OUTPATIENT)
Dept: INTERNAL MEDICINE | Facility: CLINIC | Age: 49
End: 2019-02-01

## 2019-02-01 VITALS
WEIGHT: 249.38 LBS | SYSTOLIC BLOOD PRESSURE: 132 MMHG | HEART RATE: 82 BPM | RESPIRATION RATE: 16 BRPM | HEIGHT: 64 IN | TEMPERATURE: 97.3 F | BODY MASS INDEX: 42.58 KG/M2 | OXYGEN SATURATION: 96 % | DIASTOLIC BLOOD PRESSURE: 84 MMHG

## 2019-02-01 DIAGNOSIS — K44.9 HIATAL HERNIA: ICD-10-CM

## 2019-02-01 DIAGNOSIS — B37.31 YEAST INFECTION INVOLVING THE VAGINA AND SURROUNDING AREA: ICD-10-CM

## 2019-02-01 DIAGNOSIS — E11.65 TYPE 2 DIABETES MELLITUS WITH HYPERGLYCEMIA, WITHOUT LONG-TERM CURRENT USE OF INSULIN (HCC): Primary | ICD-10-CM

## 2019-02-01 DIAGNOSIS — E66.01 MORBID OBESITY (HCC): ICD-10-CM

## 2019-02-01 PROCEDURE — 99213 OFFICE O/P EST LOW 20 MIN: CPT | Performed by: FAMILY MEDICINE

## 2019-02-01 RX ORDER — PANTOPRAZOLE SODIUM 40 MG/1
40 TABLET, DELAYED RELEASE ORAL DAILY
Qty: 90 TABLET | Refills: 4 | Status: SHIPPED | OUTPATIENT
Start: 2019-02-01 | End: 2021-01-27 | Stop reason: SDUPTHER

## 2019-02-01 RX ORDER — FLUCONAZOLE 150 MG/1
TABLET ORAL
Qty: 2 TABLET | Refills: 3 | Status: SHIPPED | OUTPATIENT
Start: 2019-02-01 | End: 2019-03-06 | Stop reason: SDUPTHER

## 2019-02-02 NOTE — PROGRESS NOTES
"Subjective    Georgia Shameka Ricardo is a 48 y.o. female here for:  Chief Complaint   Patient presents with   • Diabetes     patient states that her Blood sugar is sometimes high evem with the insulin      Diabetes   She presents for her follow-up diabetic visit. She has type 2 diabetes mellitus. Her disease course has been worsening. Associated symptoms include fatigue, foot paresthesias, polydipsia and polyuria. Diabetic complications include peripheral neuropathy. (Vaginal yeast infections, recurrent.) Risk factors for coronary artery disease include obesity, diabetes mellitus, sedentary lifestyle, post-menopausal, stress and hypertension. Current diabetic treatment includes oral agent (dual therapy). She is compliant with treatment most of the time. Her weight is stable. Her home blood glucose trend is increasing steadily. (Has had some fastings in 100s. Max has been about 300) An ACE inhibitor/angiotensin II receptor blocker is not being taken.   Obesity   This is a chronic problem. The current episode started more than 1 year ago. The problem occurs daily. The problem has been waxing and waning. Associated symptoms include abdominal pain and fatigue. Exacerbated by: diabetes. Treatments tried: metformin. The treatment provided no relief.          The following portions of the patient's history were reviewed and updated as appropriate: allergies, current medications, past family history, past medical history, past social history, past surgical history and problem list.    Review of Systems   Constitutional: Positive for fatigue.   Gastrointestinal: Positive for abdominal pain.   Endocrine: Positive for polydipsia and polyuria.       Vitals:    02/01/19 1147   BP: 132/84   Pulse: 82   Resp: 16   Temp: 97.3 °F (36.3 °C)   TempSrc: Temporal   SpO2: 96%   Weight: 113 kg (249 lb 6 oz)   Height: 162.6 cm (64.02\")         Objective   Physical Exam   Constitutional: She is oriented to person, place, and time. Vital signs " are normal. She appears well-developed and well-nourished. She is active. She does not have a sickly appearance. She does not appear ill.   Appears stated age. Well groomed.    HENT:   Head: Normocephalic and atraumatic. Hair is normal.   Right Ear: Hearing normal.   Left Ear: Hearing normal.   Nose: Nose normal.   Eyes: EOM and lids are normal. Pupils are equal, round, and reactive to light. No scleral icterus.   Neck: Phonation normal. Neck supple.   Cardiovascular: Normal rate, regular rhythm and normal heart sounds. Exam reveals no gallop and no friction rub.   No murmur heard.  Pulmonary/Chest: Effort normal and breath sounds normal.   Musculoskeletal: She exhibits no deformity.   Neurological: She is alert and oriented to person, place, and time. She displays no tremor. No cranial nerve deficit. Gait normal.   Skin: Skin is warm. No rash noted. She is not diaphoretic. No cyanosis. Nails show no clubbing.   Psychiatric: She has a normal mood and affect. Her speech is normal and behavior is normal. Judgment and thought content normal. Cognition and memory are normal.   Nursing note and vitals reviewed.      Lab Results   Component Value Date    HGBA1C 11.8 01/07/2019         Assessment/Plan     Problem List Items Addressed This Visit        Endocrine    Type 2 diabetes mellitus with hyperglycemia, without long-term current use of insulin (CMS/[facility]) - Primary    Relevant Medications    Insulin Glargine-Lixisenatide (SOLIQUA) 100-33 UNT-MCG/ML solution pen-injector      Other Visit Diagnoses     Morbid obesity (CMS/[facility])        Yeast infection involving the vagina and surrounding area        Relevant Medications    fluconazole (DIFLUCAN) 150 MG tablet    Hiatal hernia        Relevant Medications    pantoprazole (PROTONIX) 40 MG EC tablet          · Increased soloquia dose. Keep sugar log.   · Patient's Body mass index is 42.78 kg/m². BMI is above normal parameters. Recommendations include: pharmacological  intervention.  ·     Return in about 2 months (around 4/15/2019) for Diabetes follow up.    Lovely Loyd MD

## 2019-02-14 ENCOUNTER — OFFICE VISIT (OUTPATIENT)
Dept: INTERNAL MEDICINE | Facility: CLINIC | Age: 49
End: 2019-02-14

## 2019-02-14 VITALS
WEIGHT: 249 LBS | BODY MASS INDEX: 42.51 KG/M2 | DIASTOLIC BLOOD PRESSURE: 72 MMHG | HEART RATE: 85 BPM | TEMPERATURE: 98.5 F | OXYGEN SATURATION: 95 % | SYSTOLIC BLOOD PRESSURE: 140 MMHG | HEIGHT: 64 IN

## 2019-02-14 DIAGNOSIS — J06.9 VIRAL URI: Primary | ICD-10-CM

## 2019-02-14 DIAGNOSIS — R52 GENERALIZED BODY ACHES: ICD-10-CM

## 2019-02-14 LAB
EXPIRATION DATE: NORMAL
FLUAV AG NPH QL: NEGATIVE
FLUBV AG NPH QL: NEGATIVE
INTERNAL CONTROL: NORMAL
Lab: NORMAL

## 2019-02-14 PROCEDURE — 87804 INFLUENZA ASSAY W/OPTIC: CPT | Performed by: FAMILY MEDICINE

## 2019-02-14 PROCEDURE — 99213 OFFICE O/P EST LOW 20 MIN: CPT | Performed by: FAMILY MEDICINE

## 2019-02-14 RX ORDER — MOMETASONE FUROATE 50 UG/1
2 SPRAY, METERED NASAL DAILY
Qty: 1 EACH | Refills: 1 | Status: SHIPPED | OUTPATIENT
Start: 2019-02-14 | End: 2019-10-17 | Stop reason: SDUPTHER

## 2019-02-14 RX ORDER — BENZONATATE 200 MG/1
200 CAPSULE ORAL 3 TIMES DAILY PRN
Qty: 30 CAPSULE | Refills: 1 | Status: SHIPPED | OUTPATIENT
Start: 2019-02-14 | End: 2019-06-13

## 2019-02-14 NOTE — ASSESSMENT & PLAN NOTE
Flu test negative. Discussed with patient she is out of the 48 hour window that tamiflu would be effective.  Discussed the course of viral illnesses and will treat symptomatically with nasal steroid, tessalon perles, and tylenol.

## 2019-02-14 NOTE — PROGRESS NOTES
Karli Ricardo is a 48 y.o. female.    Chief Complaint   Patient presents with   • Generalized Body Aches     x 2 days    • Fever   • Chills       HPI   Patient reports they have not been feeling well for 3day(s). She admits to rhinorrhea, nasal congestion, facial pain, ear pain, sore throat, headache, sneezing, fever, drainage, cough, body aches.  She denies nothing.  They have tried mucinex and nasal spray for this issue without good response.  Co-workers have tested positive for the flu.      The following portions of the patient's history were reviewed and updated as appropriate: allergies, current medications, past family history, past medical history, past social history, past surgical history and problem list.     No Known Allergies      Current Outpatient Medications:   •  albuterol (PROVENTIL HFA;VENTOLIN HFA) 108 (90 Base) MCG/ACT inhaler, Inhale 2 puffs Every 6 (Six) Hours As Needed for Wheezing or Shortness of Air., Disp: 1 inhaler, Rfl: 5  •  benzonatate (TESSALON) 200 MG capsule, Take 1 capsule by mouth 3 (Three) Times a Day As Needed for Cough., Disp: 30 capsule, Rfl: 1  •  fluconazole (DIFLUCAN) 150 MG tablet, TAKE ONE TAB PO X ONCE, CAN REPEAT IN 4 DAYS IF NEEDED, Disp: 2 tablet, Rfl: 3  •  gabapentin (NEURONTIN) 300 MG capsule, Take 1 capsule by mouth 3 (Three) Times a Day As Needed (numbness/burning pain)., Disp: 90 capsule, Rfl: 0  •  glucose blood test strip, TEST DAILY E11.9, Disp: 100 each, Rfl: 12  •  glucose monitor monitoring kit, 1 each As Needed (diabetes). Check sugars fasting and 2 hours after meals., Disp: 1 each, Rfl: 0  •  hydrocortisone 2.5 % cream, Apply  topically 2 (Two) Times a Day., Disp: 30 g, Rfl: 0  •  Insulin Glargine-Lixisenatide (SOLIQUA) 100-33 UNT-MCG/ML solution pen-injector, Inject 20 Units under the skin into the appropriate area as directed Daily., Disp: 18 mL, Rfl: 4  •  Insulin Pen Needle (BD PEN NEEDLE LAURITA U/F) 32G X 4 MM misc, 1 Units 4 (Four) Times a  "Day., Disp: 100 each, Rfl: 12  •  metFORMIN ER (GLUCOPHAGE XR) 500 MG 24 hr tablet, Take 2 tablets by mouth Daily With Breakfast., Disp: 180 tablet, Rfl: 4  •  ondansetron ODT (ZOFRAN ODT) 4 MG disintegrating tablet, Take 1 tablet by mouth Every 8 (Eight) Hours As Needed for Nausea or Vomiting., Disp: 12 tablet, Rfl: 0  •  pantoprazole (PROTONIX) 40 MG EC tablet, Take 1 tablet by mouth Daily., Disp: 90 tablet, Rfl: 4  •  mometasone (NASONEX) 50 MCG/ACT nasal spray, 2 sprays into the nostril(s) as directed by provider Daily., Disp: 1 each, Rfl: 1  •  ONE TOUCH LANCETS misc, 1 each 3 (Three) Times a Day. E11.65, Disp: 200 each, Rfl: 5    ROS    Review of Systems   Constitutional: Positive for chills, fatigue and fever.   HENT: Positive for congestion, ear pain, postnasal drip, rhinorrhea, sinus pressure, sneezing and sore throat.    Respiratory: Positive for cough and shortness of breath. Negative for wheezing.    Cardiovascular: Negative for chest pain and leg swelling.   Gastrointestinal: Negative for abdominal pain, constipation, diarrhea, nausea and vomiting.   Musculoskeletal: Positive for arthralgias and myalgias.   Skin: Negative for color change and rash.   Neurological: Positive for weakness and headache.       Vitals:    02/14/19 1450   BP: 140/72   BP Location: Left arm   Patient Position: Sitting   Cuff Size: Adult   Pulse: 85   Temp: 98.5 °F (36.9 °C)   TempSrc: Oral   SpO2: 95%   Weight: 113 kg (249 lb)   Height: 162.6 cm (64.02\")     Body mass index is 42.71 kg/m².    Physical Exam     Physical Exam   Constitutional: She is oriented to person, place, and time. She appears well-developed and well-nourished. No distress.   HENT:   Head: Normocephalic and atraumatic.   Right Ear: External ear normal. A middle ear effusion is present.   Left Ear: Tympanic membrane and external ear normal.   Mouth/Throat: Posterior oropharyngeal erythema present.   Eyes: Conjunctivae and EOM are normal.   Neck: Normal range " of motion. Neck supple.   Cardiovascular: Normal rate and regular rhythm.   No murmur heard.  Pulmonary/Chest: Effort normal and breath sounds normal. No respiratory distress. She has no wheezes.   Abdominal: Soft. Bowel sounds are normal. She exhibits no distension. There is no tenderness.   Lymphadenopathy:     She has no cervical adenopathy.   Neurological: She is alert and oriented to person, place, and time. No cranial nerve deficit.   Skin: Skin is warm and dry.   Psychiatric: She has a normal mood and affect. Her behavior is normal.       Assessment/Plan    Problem List Items Addressed This Visit        Respiratory    Viral URI - Primary     Flu test negative. Discussed with patient she is out of the 48 hour window that tamiflu would be effective.  Discussed the course of viral illnesses and will treat symptomatically with nasal steroid, tessalon perles, and tylenol.            Other Visit Diagnoses     Generalized body aches        Relevant Orders    POCT Influenza A/B (Completed)          New Medications Ordered This Visit   Medications   • benzonatate (TESSALON) 200 MG capsule     Sig: Take 1 capsule by mouth 3 (Three) Times a Day As Needed for Cough.     Dispense:  30 capsule     Refill:  1   • mometasone (NASONEX) 50 MCG/ACT nasal spray     Si sprays into the nostril(s) as directed by provider Daily.     Dispense:  1 each     Refill:  1   • ONE TOUCH LANCETS misc     Si each 3 (Three) Times a Day. E11.65     Dispense:  200 each     Refill:  5     Onetouch Ultra 2       No orders of the defined types were placed in this encounter.      Return if symptoms worsen or fail to improve.    Cherise Jones,

## 2019-03-06 ENCOUNTER — PRIOR AUTHORIZATION (OUTPATIENT)
Dept: INTERNAL MEDICINE | Facility: CLINIC | Age: 49
End: 2019-03-06

## 2019-03-06 ENCOUNTER — OFFICE VISIT (OUTPATIENT)
Dept: INTERNAL MEDICINE | Facility: CLINIC | Age: 49
End: 2019-03-06

## 2019-03-06 VITALS
HEART RATE: 90 BPM | DIASTOLIC BLOOD PRESSURE: 82 MMHG | HEIGHT: 64 IN | SYSTOLIC BLOOD PRESSURE: 124 MMHG | OXYGEN SATURATION: 98 % | RESPIRATION RATE: 16 BRPM | TEMPERATURE: 97.7 F | BODY MASS INDEX: 41.68 KG/M2 | WEIGHT: 244.13 LBS

## 2019-03-06 DIAGNOSIS — E66.01 MORBID OBESITY (HCC): ICD-10-CM

## 2019-03-06 DIAGNOSIS — B35.1 ONYCHOMYCOSIS: ICD-10-CM

## 2019-03-06 DIAGNOSIS — B37.31 YEAST INFECTION INVOLVING THE VAGINA AND SURROUNDING AREA: ICD-10-CM

## 2019-03-06 DIAGNOSIS — Z79.4 TYPE 2 DIABETES MELLITUS WITH HYPERGLYCEMIA, WITH LONG-TERM CURRENT USE OF INSULIN (HCC): Primary | ICD-10-CM

## 2019-03-06 DIAGNOSIS — R21 RASH AND NONSPECIFIC SKIN ERUPTION: ICD-10-CM

## 2019-03-06 DIAGNOSIS — E11.65 TYPE 2 DIABETES MELLITUS WITH HYPERGLYCEMIA, WITH LONG-TERM CURRENT USE OF INSULIN (HCC): Primary | ICD-10-CM

## 2019-03-06 PROBLEM — J06.9 VIRAL URI: Status: RESOLVED | Noted: 2019-02-14 | Resolved: 2019-03-06

## 2019-03-06 PROCEDURE — 99214 OFFICE O/P EST MOD 30 MIN: CPT | Performed by: FAMILY MEDICINE

## 2019-03-06 RX ORDER — FLUCONAZOLE 150 MG/1
TABLET ORAL
Qty: 2 TABLET | Refills: 3 | Status: SHIPPED | OUTPATIENT
Start: 2019-03-06 | End: 2019-08-01 | Stop reason: SDUPTHER

## 2019-03-06 RX ORDER — CLOTRIMAZOLE 1 %
CREAM (GRAM) TOPICAL 2 TIMES DAILY
Qty: 45 G | Refills: 0 | Status: SHIPPED | OUTPATIENT
Start: 2019-03-06 | End: 2020-11-19

## 2019-03-06 NOTE — PROGRESS NOTES
"Subjective    Georgia Shameka Ricardo is a 48 y.o. female here for:  Chief Complaint   Patient presents with   • Diabetes     2  mo f/u pt states her blood surgar has been up and down and not consitent    • Obesity     2 mo f/u       History of Present Illness     Sugars slowly coming down with Soliqua, some under 200 now. She feels dizzy at times, especially with one sugar in the low 100s. Weight is steadily declining as well which patient likes. Her appetite is down somewhat as well.     She also mentions discoloration to her left great toenail, has yellow white discoloration at the distal end of the nail. No known injuries, other nails not affected. She tried Vicks vaporub, unsure if it helped.     The following portions of the patient's history were reviewed and updated as appropriate: allergies, current medications, past family history, past medical history, past social history, past surgical history and problem list.    Review of Systems   Constitutional: Positive for appetite change.   Musculoskeletal: Positive for arthralgias.   Skin: Positive for rash (abdominal pannus, clotrimazole helped).   Neurological: Positive for light-headedness.       Vitals:    03/06/19 1034   BP: 124/82   Pulse: 90   Resp: 16   Temp: 97.7 °F (36.5 °C)   TempSrc: Temporal   SpO2: 98%   Weight: 111 kg (244 lb 2 oz)   Height: 162.6 cm (64.02\")     Wt Readings from Last 3 Encounters:   03/06/19 111 kg (244 lb 2 oz)   02/14/19 113 kg (249 lb)   02/01/19 113 kg (249 lb 6 oz)         Objective   Physical Exam   Constitutional: She is oriented to person, place, and time. Vital signs are normal. She appears well-developed and well-nourished. She is active.  Non-toxic appearance. She does not have a sickly appearance. She does not appear ill. No distress. She is obese.  HENT:   Head: Normocephalic and atraumatic. Hair is normal.   Right Ear: Hearing and external ear normal.   Left Ear: Hearing and external ear normal.   Nose: Nose normal. "   Mouth/Throat: Mucous membranes are not dry. No trismus in the jaw.   Eyes: Conjunctivae, EOM and lids are normal. Pupils are equal, round, and reactive to light. No scleral icterus.   Neck: Phonation normal. Neck supple. No tracheal deviation present.   Cardiovascular: Normal rate, regular rhythm and normal heart sounds.   No murmur heard.  Pulmonary/Chest: Effort normal and breath sounds normal.   Musculoskeletal: She exhibits no edema or deformity.   Neurological: She is alert and oriented to person, place, and time. She displays no tremor. No cranial nerve deficit. She exhibits normal muscle tone. Gait normal.   Skin: Skin is warm. Turgor is normal. No rash noted. She is not diaphoretic. No cyanosis. No pallor. Nails show no clubbing.   Distal third-half of great toenail left is yellow-white and slightly brittle appearing    Psychiatric: She has a normal mood and affect. Her speech is normal and behavior is normal. Judgment and thought content normal. Cognition and memory are normal.   Nursing note and vitals reviewed.      Lab Results   Component Value Date    HGBA1C 11.8 01/07/2019    HGBA1C 10.60 08/01/2018    HGBA1C 11.3 07/18/2018       Assessment/Plan     Problem List Items Addressed This Visit        Digestive    Morbid obesity (CMS/[facility])       Endocrine    Type 2 diabetes mellitus with hyperglycemia, with long-term current use of insulin (CMS/[facility]) - Primary      Other Visit Diagnoses     Onychomycosis        Relevant Medications    fluconazole (DIFLUCAN) 150 MG tablet    Efinaconazole (JUBLIA) 10 % solution    Yeast infection involving the vagina and surrounding area        Relevant Medications    fluconazole (DIFLUCAN) 150 MG tablet    Rash and nonspecific skin eruption        Relevant Medications    clotrimazole (LOTRIMIN) 1 % cream    Efinaconazole (JUBLIA) 10 % solution          · Diabetes mellitus and morbid obesity improving. Continue current medicine for diabetes mellitus  · Patient's Body mass  index is 41.88 kg/m². BMI is above normal parameters. Recommendations include: educational material, nutrition counseling and pharmacological intervention.  ·     Return in about 6 weeks (around 4/17/2019) for Diabetes follow up, a1c.    Lovely Loyd MD

## 2019-03-06 NOTE — PATIENT INSTRUCTIONS
For more information:     Quit Now [place]  1-800-QUIT-NOW  https://[place].quitlogix.org/en-[place]/  Steps to Quit Smoking    Smoking tobacco can be harmful to your health and can affect almost every organ in your body. Smoking puts you, and those around you, at risk for developing many serious chronic diseases. Quitting smoking is difficult, but it is one of the best things that you can do for your health. It is never too late to quit.  What are the benefits of quitting smoking?  When you quit smoking, you lower your risk of developing serious diseases and conditions, such as:  · Lung cancer or lung disease, such as COPD.  · Heart disease.  · Stroke.  · Heart attack.  · Infertility.  · Osteoporosis and bone fractures.  Additionally, symptoms such as coughing, wheezing, and shortness of breath may get better when you quit. You may also find that you get sick less often because your body is stronger at fighting off colds and infections. If you are pregnant, quitting smoking can help to reduce your chances of having a baby of low birth weight.  How do I get ready to quit?  When you decide to quit smoking, create a plan to make sure that you are successful. Before you quit:  · Pick a date to quit. Set a date within the next two weeks to give you time to prepare.  · Write down the reasons why you are quitting. Keep this list in places where you will see it often, such as on your bathroom mirror or in your car or wallet.  · Identify the people, places, things, and activities that make you want to smoke (triggers) and avoid them. Make sure to take these actions:  ? Throw away all cigarettes at home, at work, and in your car.  ? Throw away smoking accessories, such as ashtrays and lighters.  ? Clean your car and make sure to empty the ashtray.  ? Clean your home, including curtains and carpets.  · Tell your family, friends, and coworkers that you are quitting. Support from your loved ones can make quitting easier.  · Talk  with your health care provider about your options for quitting smoking.  · Find out what treatment options are covered by your health insurance.  What strategies can I use to quit smoking?  Talk with your healthcare provider about different strategies to quit smoking. Some strategies include:  · Quitting smoking altogether instead of gradually lessening how much you smoke over a period of time. Research shows that quitting “cold turkey” is more successful than gradually quitting.  · Attending in-person counseling to help you build problem-solving skills. You are more likely to have success in quitting if you attend several counseling sessions. Even short sessions of 10 minutes can be effective.  · Finding resources and support systems that can help you to quit smoking and remain smoke-free after you quit. These resources are most helpful when you use them often. They can include:  ? Online chats with a counselor.  ? Telephone quitlines.  ? Printed self-help materials.  ? Support groups or group counseling.  ? Text messaging programs.  ? Mobile phone applications.  · Taking medicines to help you quit smoking. (If you are pregnant or breastfeeding, talk with your health care provider first.) Some medicines contain nicotine and some do not. Both types of medicines help with cravings, but the medicines that include nicotine help to relieve withdrawal symptoms. Your health care provider may recommend:  ? Nicotine patches, gum, or lozenges.  ? Nicotine inhalers or sprays.  ? Non-nicotine medicine that is taken by mouth.  Talk with your health care provider about combining strategies, such as taking medicines while you are also receiving in-person counseling. Using these two strategies together makes you more likely to succeed in quitting than if you used either strategy on its own.  If you are pregnant or breastfeeding, talk with your health care provider about finding counseling or other support strategies to quit  smoking. Do not take medicine to help you quit smoking unless told to do so by your health care provider.  What things can I do to make it easier to quit?  Quitting smoking might feel overwhelming at first, but there is a lot that you can do to make it easier. Take these important actions:  · Reach out to your family and friends and ask that they support and encourage you during this time. Call telephone quitlines, reach out to support groups, or work with a counselor for support.  · Ask people who smoke to avoid smoking around you.  · Avoid places that trigger you to smoke, such as bars, parties, or smoke-break areas at work.  · Spend time around people who do not smoke.  · Lessen stress in your life, because stress can be a smoking trigger for some people. To lessen stress, try:  ? Exercising regularly.  ? Deep-breathing exercises.  ? Yoga.  ? Meditating.  ? Performing a body scan. This involves closing your eyes, scanning your body from head to toe, and noticing which parts of your body are particularly tense. Purposefully relax the muscles in those areas.  · Download or purchase mobile phone or tablet apps (applications) that can help you stick to your quit plan by providing reminders, tips, and encouragement. There are many free apps, such as QuitGuide from the CDC (Centers for Disease Control and Prevention). You can find other support for quitting smoking (smoking cessation) through smokefree.gov and other websites.  How will I feel when I quit smoking?  Within the first 24 hours of quitting smoking, you may start to feel some withdrawal symptoms. These symptoms are usually most noticeable 2-3 days after quitting, but they usually do not last beyond 2-3 weeks. Changes or symptoms that you might experience include:  · Mood swings.  · Restlessness, anxiety, or irritation.  · Difficulty concentrating.  · Dizziness.  · Strong cravings for sugary foods in addition to nicotine.  · Mild weight  gain.  · Constipation.  · Nausea.  · Coughing or a sore throat.  · Changes in how your medicines work in your body.  · A depressed mood.  · Difficulty sleeping (insomnia).  After the first 2-3 weeks of quitting, you may start to notice more positive results, such as:  · Improved sense of smell and taste.  · Decreased coughing and sore throat.  · Slower heart rate.  · Lower blood pressure.  · Clearer skin.  · The ability to breathe more easily.  · Fewer sick days.  Quitting smoking is very challenging for most people. Do not get discouraged if you are not successful the first time. Some people need to make many attempts to quit before they achieve long-term success. Do your best to stick to your quit plan, and talk with your health care provider if you have any questions or concerns.    This information is not intended to replace advice given to you by your health care provider. Make sure you discuss any questions you have with your health care provider.  Document Released: 12/12/2002 Document Revised: 08/15/2017 Document Reviewed: 05/03/2016  [employer] Interactive Patient Education © 2017 [employer]       Serving Sizes  A serving size is a measured amount of food or drink, such as one slice of bread, that has an associated nutrient content. Knowing the serving size of a food or drink can help you determine how much of that food you should consume.  What is the size of one serving?  The size of one healthy serving depends on the food or drink. To determine a serving size, read the food label. If the food or drink does not have a food label, try to find serving size information online. Or, use the following to estimate the size of one adult serving:  Grain  1 slice bread. ½ bagel. ½ cup pasta.  Vegetable  ½ cup cooked or canned vegetables. 1 cup raw, leafy greens.  Fruit  ½ cup canned fruit. 1 medium fruit. ¼ cup dried fruit.  Meat and Other Protein Sources  1 oz meat, poultry, or fish. ¼ cup cooked beans. 1 egg. ¼  cup nuts or seeds. 1 Tbsp nut butter. ¼ cup tofu or tempeh. 2 Tbsp hummus.  Dairy  An individual container of yogurt (6-8 oz). 1 piece of cheese the size of your thumb (1 oz). 1 cup (8 oz) milk or milk alternative.  Fat  A piece the size of one dice. 1 tsp soft margarine. 1 Tbsp mayonnaise. 1 tsp vegetable oil. 1 Tbsp regular salad dressing. 2 Tbsp low-fat salad dressing.  How many servings should I eat from each food group each day?  The following are the suggested number of servings to try and have every day from each food group. You can also look at your eating throughout the week and aim for meeting these requirements on most days for overall healthy eating.  Grain  6-8 servings. Try to have half of your grains from whole grains, such as whole wheat bread, corn tortillas, oatmeal, brown rice, whole wheat pasta, and bulgur.  Vegetable  At least 2½-3 servings.  Fruit  2 servings.  Meat and Other Protein Foods  5-6 servings. Aim to have lean proteins, such as chicken, turkey, fish, beans, or tofu.  Dairy  3 servings. Choose low-fat or nonfat if you are trying to control your weight.  Fat  2-3 servings.  Is a serving the same thing as a portion?  No. A portion is the actual amount you eat, which may be more than one serving. Knowing the specific serving size of a food and the nutritional information that goes with it can help you make a healthy decision on what size portion to eat.  What are some tips to help me learn healthy serving sizes?  · Check food labels for serving sizes. Many foods that come as a single portion actually contain multiple servings.  · Determine the serving size of foods you commonly eat and figure out how large a portion you usually eat.  · Measure the number of servings that can be held by the bowls, glasses, cups, and plates you typically use. For example, pour your breakfast cereal into your regular bowl and then pour it into a measuring cup.  · For 1-2 days, measure the serving sizes of  all the foods you eat.  · Practice estimating serving sizes and determining how big your portions should be.      This information is not intended to replace advice given to you by your health care provider. Make sure you discuss any questions you have with your health care provider.  Document Released: 09/15/2004 Document Revised: 08/12/2017 Document Reviewed: 03/17/2015  [employer] Interactive Patient Education © 2018 [employer]    MyPlate from [employer]    The general, healthful diet is based on the 2010 Dietary Guidelines for Americans. The amount of food you need to eat from each food group depends on your age, sex, and level of physical activity and can be individualized by a dietitian. Go to ChooseMyPlate.gov for more information.  What do I need to know about the MyPlate plan?  · Enjoy your food, but eat less.  · Avoid oversized portions.  ? ½ of your plate should include fruits and vegetables.  ? ¼ of your plate should be grains.  ? ¼ of your plate should be protein.  Grains  · Make at least half of your grains whole grains.  · For a 2,000 calorie daily food plan, eat 6 oz every day.  · 1 oz is about 1 slice bread, 1 cup cereal, or ½ cup cooked rice, cereal, or pasta.  Vegetables  · Make half your plate fruits and vegetables.  · For a 2,000 calorie daily food plan, eat 2½ cups every day.  · 1 cup is about 1 cup raw or cooked vegetables or vegetable juice or 2 cups raw leafy greens.  Fruits  · Make half your plate fruits and vegetables.  · For a 2,000 calorie daily food plan, eat 2 cups every day.  · 1 cup is about 1 cup fruit or 100% fruit juice or ½ cup dried fruit.  Protein  · For a 2,000 calorie daily food plan, eat 5½ oz every day.  · 1 oz is about 1 oz meat, poultry, or fish, ¼ cup cooked beans, 1 egg, 1 Tbsp peanut butter, or ½ oz nuts or seeds.  Dairy  · Switch to fat-free or low-fat (1%) milk.  · For a 2,000 calorie daily food plan, eat 3 cups every day.  · 1 cup is about 1 cup milk or yogurt or soy  milk (soy beverage), 1½ oz natural cheese, or 2 oz processed cheese.  Fats, Oils, and Empty Calories  · Only small amounts of oils are recommended.  · Empty calories are calories from solid fats or added sugars.  · Compare sodium in foods like soup, bread, and frozen meals. Choose the foods with lower numbers.  · Drink water instead of sugary drinks.  What foods can I eat?  Grains  Whole grains such as whole wheat, quinoa, millet, and bulgur. Bread, rolls, and pasta made from whole grains. Brown or wild rice. Hot or cold cereals made from whole grains and without added sugar.  Vegetables  All fresh vegetables, especially fresh red, dark green, or orange vegetables. Peas and beans. Low-sodium frozen or canned vegetables prepared without added salt. Low-sodium vegetable juices.  Fruits  All fresh, frozen, and dried fruits. Canned fruit packed in water or fruit juice without added sugar. Fruit juices without added sugar.  Meats and Other Protein Sources  Boiled, baked, or grilled lean meat trimmed of fat. Skinless poultry. Fresh seafood and shellfish. Canned seafood packed in water. Unsalted nuts and unsalted nut butters. Tofu. Dried beans and pea. Eggs.  Dairy  Low-fat or fat-free milk, yogurt, and cheeses.  Sweets and Desserts  Frozen desserts made from low-fat milk.  Fats and Oils  Olive, peanut, and canola oils and margarine. Salad dressing and mayonnaise made from these oils.  Other  Soups and casseroles made from allowed ingredients and without added fat or salt.  The items listed above may not be a complete list of recommended foods or beverages. Contact your dietitian for more options.  What foods are not recommended?  Grains  Sweetened, low-fiber cereals. Packaged baked goods. Snack crackers and chips. Cheese crackers, butter crackers, and biscuits. Frozen waffles, sweet breads, doughnuts, pastries, packaged baking mixes, pancakes, cakes, and cookies.  Vegetables  Regular canned or frozen vegetables or  vegetables prepared with salt. Canned tomatoes. Canned tomato sauce. Fried vegetables. Vegetables in cream sauce or cheese sauce.  Fruits  Fruits packed in syrup or made with added sugar.  Meats and Other Protein Sources  Marbled or fatty meats such as ribs. Poultry with skin. Fried meats, poultry, eggs, or fish. Sausages, hot dogs, and deli meats such as pastrami, bologna, or salami.  Dairy  Whole milk, cream, cheeses made from whole milk, sour cream. Ice cream or yogurt made from whole milk or with added sugar.  Beverages  For adults, no more than one alcoholic drink per day. Regular soft drinks or other sugary beverages. Juice drinks.  Sweets and Desserts  Sugary or fatty desserts, candy, and other sweets.  Fats and Oils  Solid shortening or partially hydrogenated oils. Solid margarine. Margarine that contains trans fats. Butter.    The items listed above may not be a complete list of foods and beverages to avoid. Contact your dietitian for more information.    This information is not intended to replace advice given to you by your health care provider. Make sure you discuss any questions you have with your health care provider.  Document Released: 01/06/2009 Document Revised: 05/25/2017 Document Reviewed: 11/26/2014  [employer] Interactive Patient Education © 2018 [employer]        Calorie Counting for Weight Loss  Calories are units of energy. Your body needs a certain amount of calories from food to keep you going throughout the day. When you eat more calories than your body needs, your body stores the extra calories as fat. When you eat fewer calories than your body needs, your body burns fat to get the energy it needs.  Calorie counting means keeping track of how many calories you eat and drink each day. Calorie counting can be helpful if you need to lose weight. If you make sure to eat fewer calories than your body needs, you should lose weight. Ask your health care provider what a healthy weight is for  you.  For calorie counting to work, you will need to eat the right number of calories in a day in order to lose a healthy amount of weight per week. A dietitian can help you determine how many calories you need in a day and will give you suggestions on how to reach your calorie goal.  · A healthy amount of weight to lose per week is usually 1-2 lb (0.5-0.9 kg). This usually means that your daily calorie intake should be reduced by 500-750 calories.  · Eating 1,200 - 1,500 calories per day can help most women lose weight.  · Eating 1,500 - 1,800 calories per day can help most men lose weight.     What do I need to know about calorie counting?  In order to meet your daily calorie goal, you will need to:  · Find out how many calories are in each food you would like to eat. Try to do this before you eat.  · Decide how much of the food you plan to eat.  · Write down what you ate and how many calories it had. Doing this is called keeping a food log.     To successfully lose weight, it is important to balance calorie counting with a healthy lifestyle that includes regular activity. Aim for 150 minutes of moderate exercise (such as walking) or 75 minutes of vigorous exercise (such as running) each week.  Where do I find calorie information?       The number of calories in a food can be found on a Nutrition Facts label. If a food does not have a Nutrition Facts label, try to look up the calories online or ask your dietitian for help.  Remember that calories are listed per serving. If you choose to have more than one serving of a food, you will have to multiply the calories per serving by the amount of servings you plan to eat. For example, the label on a package of bread might say that a serving size is 1 slice and that there are 90 calories in a serving. If you eat 1 slice, you will have eaten 90 calories. If you eat 2 slices, you will have eaten 180 calories.  How do I keep a food log?  Immediately after each meal, record  "the following information in your food log:  · What you ate. Don't forget to include toppings, sauces, and other extras on the food.  · How much you ate. This can be measured in cups, ounces, or number of items.  · How many calories each food and drink had.  · The total number of calories in the meal.     Keep your food log near you, such as in a small notebook in your pocket, or use a mobile whitley or website. Some programs will calculate calories for you and show you how many calories you have left for the day to meet your goal.  What are some calorie counting tips?  · Use your calories on foods and drinks that will fill you up and not leave you hungry:  ? Some examples of foods that fill you up are nuts and nut butters, vegetables, lean proteins, and high-fiber foods like whole grains. High-fiber foods are foods with more than 5 g fiber per serving.  ? Drinks such as sodas, specialty coffee drinks, alcohol, and juices have a lot of calories, yet do not fill you up.  · Eat nutritious foods and avoid empty calories. Empty calories are calories you get from foods or beverages that do not have many vitamins or protein, such as candy, sweets, and soda. It is better to have a nutritious high-calorie food (such as an avocado) than a food with few nutrients (such as a bag of chips).  · Know how many calories are in the foods you eat most often. This will help you calculate calorie counts faster.  · Pay attention to calories in drinks. Low-calorie drinks include water and unsweetened drinks.  · Pay attention to nutrition labels for \"low fat\" or \"fat free\" foods. These foods sometimes have the same amount of calories or more calories than the full fat versions. They also often have added sugar, starch, or salt, to make up for flavor that was removed with the fat.  ·   · Find a way of tracking calories that works for you. Get creative. Try different apps or programs if writing down calories does not work for you.  What are " some portion control tips?  · Know how many calories are in a serving. This will help you know how many servings of a certain food you can have.  · Use a measuring cup to measure serving sizes. You could also try weighing out portions on a kitchen scale. With time, you will be able to estimate serving sizes for some foods.  · Take some time to put servings of different foods on your favorite plates, bowls, and cups so you know what a serving looks like.  · Try not to eat straight from a bag or box. Doing this can lead to overeating. Put the amount you would like to eat in a cup or on a plate to make sure you are eating the right portion.  · Use smaller plates, glasses, and bowls to prevent overeating.  · Try not to multitask (for example, watch TV or use your computer) while eating. If it is time to eat, sit down at a table and enjoy your food. This will help you to know when you are full. It will also help you to be aware of what you are eating and how much you are eating.  What are tips for following this plan?  Reading food labels  · Check the calorie count compared to the serving size. The serving size may be smaller than what you are used to eating.  · Check the source of the calories. Make sure the food you are eating is high in vitamins and protein and low in saturated and trans fats.  Shopping  · Read nutrition labels while you shop. This will help you make healthy decisions before you decide to purchase your food.  · Make a grocery list and stick to it.  Cooking  · Try to cook your favorite foods in a healthier way. For example, try baking instead of frying.  · Use low-fat dairy products.  Meal planning  · Use more fruits and vegetables. Half of your plate should be fruits and vegetables.  · Include lean proteins like poultry and fish.  How do I count calories when eating out?  · Ask for smaller portion sizes.  · Consider sharing an entree and sides instead of getting your own entree.  · If you get your own  entree, eat only half. Ask for a box at the beginning of your meal and put the rest of your entree in it so you are not tempted to eat it.  · If calories are listed on the menu, choose the lower calorie options.  · Choose dishes that include vegetables, fruits, whole grains, low-fat dairy products, and lean protein.  · Choose items that are boiled, broiled, grilled, or steamed. Stay away from items that are buttered, battered, fried, or served with cream sauce. Items labeled “crispy” are usually fried, unless stated otherwise.  · Choose water, low-fat milk, unsweetened iced tea, or other drinks without added sugar. If you want an alcoholic beverage, choose a lower calorie option such as a glass of wine or light beer.  · Ask for dressings, sauces, and syrups on the side. These are usually high in calories, so you should limit the amount you eat.  · If you want a salad, choose a garden salad and ask for grilled meats. Avoid extra toppings like steel, cheese, or fried items. Ask for the dressing on the side, or ask for olive oil and vinegar or lemon to use as dressing.  · Estimate how many servings of a food you are given. For example, a serving of cooked rice is ½ cup or about the size of half a baseball. Knowing serving sizes will help you be aware of how much food you are eating at restaurants. The list below tells you how big or small some common portion sizes are based on everyday objects:  ? 1 oz--4 stacked dice.  ? 3 oz--1 deck of cards.  ? 1 tsp--1 die.  ? 1 Tbsp--½ a ping-pong ball.  ? 2 Tbsp--1 ping-pong ball.  ? ½ cup--½ baseball.  ? 1 cup--1 baseball.  Summary  · Calorie counting means keeping track of how many calories you eat and drink each day. If you eat fewer calories than your body needs, you should lose weight.  · A healthy amount of weight to lose per week is usually 1-2 lb (0.5-0.9 kg). This usually means reducing your daily calorie intake by 500-750 calories.  · The number of calories in a food  can be found on a Nutrition Facts label. If a food does not have a Nutrition Facts label, try to look up the calories online or ask your dietitian for help.  · Use your calories on foods and drinks that will fill you up, and not on foods and drinks that will leave you hungry.  · Use smaller plates, glasses, and bowls to prevent overeating.      This information is not intended to replace advice given to you by your health care provider. Make sure you discuss any questions you have with your health care provider.  Document Released: 12/18/2006 Document Revised: 11/17/2017 Document Reviewed: 11/17/2017  [employer] Interactive Patient Education © 2018 [employer]         Serving Sizes  A serving size is a measured amount of food or drink, such as one slice of bread, that has an associated nutrient content. Knowing the serving size of a food or drink can help you determine how much of that food you should consume.  What is the size of one serving?  The size of one healthy serving depends on the food or drink. To determine a serving size, read the food label. If the food or drink does not have a food label, try to find serving size information online. Or, use the following to estimate the size of one adult serving:  Grain  1 slice bread. ½ bagel. ½ cup pasta.  Vegetable  ½ cup cooked or canned vegetables. 1 cup raw, leafy greens.  Fruit  ½ cup canned fruit. 1 medium fruit. ¼ cup dried fruit.  Meat and Other Protein Sources  1 oz meat, poultry, or fish. ¼ cup cooked beans. 1 egg. ¼ cup nuts or seeds. 1 Tbsp nut butter. ¼ cup tofu or tempeh. 2 Tbsp hummus.  Dairy  An individual container of yogurt (6-8 oz). 1 piece of cheese the size of your thumb (1 oz). 1 cup (8 oz) milk or milk alternative.  Fat  A piece the size of one dice. 1 tsp soft margarine. 1 Tbsp mayonnaise. 1 tsp vegetable oil. 1 Tbsp regular salad dressing. 2 Tbsp low-fat salad dressing.  How many servings should I eat from each food group each day?  The  following are the suggested number of servings to try and have every day from each food group. You can also look at your eating throughout the week and aim for meeting these requirements on most days for overall healthy eating.  Grain  6-8 servings. Try to have half of your grains from whole grains, such as whole wheat bread, corn tortillas, oatmeal, brown rice, whole wheat pasta, and bulgur.  Vegetable  At least 2½-3 servings.  Fruit  2 servings.  Meat and Other Protein Foods  5-6 servings. Aim to have lean proteins, such as chicken, turkey, fish, beans, or tofu.  Dairy  3 servings. Choose low-fat or nonfat if you are trying to control your weight.  Fat  2-3 servings.  Is a serving the same thing as a portion?  No. A portion is the actual amount you eat, which may be more than one serving. Knowing the specific serving size of a food and the nutritional information that goes with it can help you make a healthy decision on what size portion to eat.  What are some tips to help me learn healthy serving sizes?  · Check food labels for serving sizes. Many foods that come as a single portion actually contain multiple servings.  · Determine the serving size of foods you commonly eat and figure out how large a portion you usually eat.  · Measure the number of servings that can be held by the bowls, glasses, cups, and plates you typically use. For example, pour your breakfast cereal into your regular bowl and then pour it into a measuring cup.  · For 1-2 days, measure the serving sizes of all the foods you eat.  · Practice estimating serving sizes and determining how big your portions should be.      This information is not intended to replace advice given to you by your health care provider. Make sure you discuss any questions you have with your health care provider.  Document Released: 09/15/2004 Document Revised: 08/12/2017 Document Reviewed: 03/17/2015  [employer] Interactive Patient Education © 2018 [employer]    MyPlate from [employer]    The general, healthful diet is based on the 2010 Dietary Guidelines for Americans. The amount of food you need to eat from each food group depends on your age, sex, and level of physical activity and can be individualized by a dietitian. Go to ChooseMyPlate.gov for more information.  What do I need to know about the MyPlate plan?  · Enjoy your food, but eat less.  · Avoid oversized portions.  ? ½ of your plate should include fruits and vegetables.  ? ¼ of your plate should be grains.  ? ¼ of your plate should be protein.  Grains  · Make at least half of your grains whole grains.  · For a 2,000 calorie daily food plan, eat 6 oz every day.  · 1 oz is about 1 slice bread, 1 cup cereal, or ½ cup cooked rice, cereal, or pasta.  Vegetables  · Make half your plate fruits and vegetables.  · For a 2,000 calorie daily food plan, eat 2½ cups every day.  · 1 cup is about 1 cup raw or cooked vegetables or vegetable juice or 2 cups raw leafy greens.  Fruits  · Make half your plate fruits and vegetables.  · For a 2,000 calorie daily food plan, eat 2 cups every day.  · 1 cup is about 1 cup fruit or 100% fruit juice or ½ cup dried fruit.  Protein  · For a 2,000 calorie daily food plan, eat 5½ oz every day.  · 1 oz is about 1 oz meat, poultry, or fish, ¼ cup cooked beans, 1 egg, 1 Tbsp peanut butter, or ½ oz nuts or seeds.  Dairy  · Switch to fat-free or low-fat (1%) milk.  · For a 2,000 calorie daily food plan, eat 3 cups every day.  · 1 cup is about 1 cup milk or yogurt or soy milk (soy beverage), 1½ oz natural cheese, or 2 oz processed cheese.  Fats, Oils, and Empty Calories  · Only small amounts of oils are recommended.  · Empty calories are calories from solid fats or added sugars.  · Compare sodium in foods like soup, bread, and frozen meals. Choose the foods with lower numbers.  · Drink water instead of sugary drinks.  What foods can I eat?  Grains  Whole grains such as whole wheat, quinoa, millet,  and bulgur. Bread, rolls, and pasta made from whole grains. Brown or wild rice. Hot or cold cereals made from whole grains and without added sugar.  Vegetables  All fresh vegetables, especially fresh red, dark green, or orange vegetables. Peas and beans. Low-sodium frozen or canned vegetables prepared without added salt. Low-sodium vegetable juices.  Fruits  All fresh, frozen, and dried fruits. Canned fruit packed in water or fruit juice without added sugar. Fruit juices without added sugar.  Meats and Other Protein Sources  Boiled, baked, or grilled lean meat trimmed of fat. Skinless poultry. Fresh seafood and shellfish. Canned seafood packed in water. Unsalted nuts and unsalted nut butters. Tofu. Dried beans and pea. Eggs.  Dairy  Low-fat or fat-free milk, yogurt, and cheeses.  Sweets and Desserts  Frozen desserts made from low-fat milk.  Fats and Oils  Olive, peanut, and canola oils and margarine. Salad dressing and mayonnaise made from these oils.  Other  Soups and casseroles made from allowed ingredients and without added fat or salt.  The items listed above may not be a complete list of recommended foods or beverages. Contact your dietitian for more options.  What foods are not recommended?  Grains  Sweetened, low-fiber cereals. Packaged baked goods. Snack crackers and chips. Cheese crackers, butter crackers, and biscuits. Frozen waffles, sweet breads, doughnuts, pastries, packaged baking mixes, pancakes, cakes, and cookies.  Vegetables  Regular canned or frozen vegetables or vegetables prepared with salt. Canned tomatoes. Canned tomato sauce. Fried vegetables. Vegetables in cream sauce or cheese sauce.  Fruits  Fruits packed in syrup or made with added sugar.  Meats and Other Protein Sources  Marbled or fatty meats such as ribs. Poultry with skin. Fried meats, poultry, eggs, or fish. Sausages, hot dogs, and deli meats such as pastrami, bologna, or salami.  Dairy  Whole milk, cream, cheeses made from whole  milk, sour cream. Ice cream or yogurt made from whole milk or with added sugar.  Beverages  For adults, no more than one alcoholic drink per day. Regular soft drinks or other sugary beverages. Juice drinks.  Sweets and Desserts  Sugary or fatty desserts, candy, and other sweets.  Fats and Oils  Solid shortening or partially hydrogenated oils. Solid margarine. Margarine that contains trans fats. Butter.    The items listed above may not be a complete list of foods and beverages to avoid. Contact your dietitian for more information.    This information is not intended to replace advice given to you by your health care provider. Make sure you discuss any questions you have with your health care provider.  Document Released: 01/06/2009 Document Revised: 05/25/2017 Document Reviewed: 11/26/2014  "[employer] Interactive Patient Education © 2018 "[employer]        Calorie Counting for Weight Loss  Calories are units of energy. Your body needs a certain amount of calories from food to keep you going throughout the day. When you eat more calories than your body needs, your body stores the extra calories as fat. When you eat fewer calories than your body needs, your body burns fat to get the energy it needs.  Calorie counting means keeping track of how many calories you eat and drink each day. Calorie counting can be helpful if you need to lose weight. If you make sure to eat fewer calories than your body needs, you should lose weight. Ask your health care provider what a healthy weight is for you.  For calorie counting to work, you will need to eat the right number of calories in a day in order to lose a healthy amount of weight per week. A dietitian can help you determine how many calories you need in a day and will give you suggestions on how to reach your calorie goal.  · A healthy amount of weight to lose per week is usually 1-2 lb (0.5-0.9 kg). This usually means that your daily calorie intake should be reduced by  500-750 calories.  · Eating 1,200 - 1,500 calories per day can help most women lose weight.  · Eating 1,500 - 1,800 calories per day can help most men lose weight.     What do I need to know about calorie counting?  In order to meet your daily calorie goal, you will need to:  · Find out how many calories are in each food you would like to eat. Try to do this before you eat.  · Decide how much of the food you plan to eat.  · Write down what you ate and how many calories it had. Doing this is called keeping a food log.     To successfully lose weight, it is important to balance calorie counting with a healthy lifestyle that includes regular activity. Aim for 150 minutes of moderate exercise (such as walking) or 75 minutes of vigorous exercise (such as running) each week.  Where do I find calorie information?       The number of calories in a food can be found on a Nutrition Facts label. If a food does not have a Nutrition Facts label, try to look up the calories online or ask your dietitian for help.  Remember that calories are listed per serving. If you choose to have more than one serving of a food, you will have to multiply the calories per serving by the amount of servings you plan to eat. For example, the label on a package of bread might say that a serving size is 1 slice and that there are 90 calories in a serving. If you eat 1 slice, you will have eaten 90 calories. If you eat 2 slices, you will have eaten 180 calories.  How do I keep a food log?  Immediately after each meal, record the following information in your food log:  · What you ate. Don't forget to include toppings, sauces, and other extras on the food.  · How much you ate. This can be measured in cups, ounces, or number of items.  · How many calories each food and drink had.  · The total number of calories in the meal.     Keep your food log near you, such as in a small notebook in your pocket, or use a mobile whitley or website. Some programs will  "calculate calories for you and show you how many calories you have left for the day to meet your goal.  What are some calorie counting tips?  · Use your calories on foods and drinks that will fill you up and not leave you hungry:  ? Some examples of foods that fill you up are nuts and nut butters, vegetables, lean proteins, and high-fiber foods like whole grains. High-fiber foods are foods with more than 5 g fiber per serving.  ? Drinks such as sodas, specialty coffee drinks, alcohol, and juices have a lot of calories, yet do not fill you up.  · Eat nutritious foods and avoid empty calories. Empty calories are calories you get from foods or beverages that do not have many vitamins or protein, such as candy, sweets, and soda. It is better to have a nutritious high-calorie food (such as an avocado) than a food with few nutrients (such as a bag of chips).  · Know how many calories are in the foods you eat most often. This will help you calculate calorie counts faster.  · Pay attention to calories in drinks. Low-calorie drinks include water and unsweetened drinks.  · Pay attention to nutrition labels for \"low fat\" or \"fat free\" foods. These foods sometimes have the same amount of calories or more calories than the full fat versions. They also often have added sugar, starch, or salt, to make up for flavor that was removed with the fat.  ·   · Find a way of tracking calories that works for you. Get creative. Try different apps or programs if writing down calories does not work for you.  What are some portion control tips?  · Know how many calories are in a serving. This will help you know how many servings of a certain food you can have.  · Use a measuring cup to measure serving sizes. You could also try weighing out portions on a kitchen scale. With time, you will be able to estimate serving sizes for some foods.  · Take some time to put servings of different foods on your favorite plates, bowls, and cups so you know " what a serving looks like.  · Try not to eat straight from a bag or box. Doing this can lead to overeating. Put the amount you would like to eat in a cup or on a plate to make sure you are eating the right portion.  · Use smaller plates, glasses, and bowls to prevent overeating.  · Try not to multitask (for example, watch TV or use your computer) while eating. If it is time to eat, sit down at a table and enjoy your food. This will help you to know when you are full. It will also help you to be aware of what you are eating and how much you are eating.  What are tips for following this plan?  Reading food labels  · Check the calorie count compared to the serving size. The serving size may be smaller than what you are used to eating.  · Check the source of the calories. Make sure the food you are eating is high in vitamins and protein and low in saturated and trans fats.  Shopping  · Read nutrition labels while you shop. This will help you make healthy decisions before you decide to purchase your food.  · Make a grocery list and stick to it.  Cooking  · Try to cook your favorite foods in a healthier way. For example, try baking instead of frying.  · Use low-fat dairy products.  Meal planning  · Use more fruits and vegetables. Half of your plate should be fruits and vegetables.  · Include lean proteins like poultry and fish.  How do I count calories when eating out?  · Ask for smaller portion sizes.  · Consider sharing an entree and sides instead of getting your own entree.  · If you get your own entree, eat only half. Ask for a box at the beginning of your meal and put the rest of your entree in it so you are not tempted to eat it.  · If calories are listed on the menu, choose the lower calorie options.  · Choose dishes that include vegetables, fruits, whole grains, low-fat dairy products, and lean protein.  · Choose items that are boiled, broiled, grilled, or steamed. Stay away from items that are buttered, battered,  fried, or served with cream sauce. Items labeled “crispy” are usually fried, unless stated otherwise.  · Choose water, low-fat milk, unsweetened iced tea, or other drinks without added sugar. If you want an alcoholic beverage, choose a lower calorie option such as a glass of wine or light beer.  · Ask for dressings, sauces, and syrups on the side. These are usually high in calories, so you should limit the amount you eat.  · If you want a salad, choose a garden salad and ask for grilled meats. Avoid extra toppings like steel, cheese, or fried items. Ask for the dressing on the side, or ask for olive oil and vinegar or lemon to use as dressing.  · Estimate how many servings of a food you are given. For example, a serving of cooked rice is ½ cup or about the size of half a baseball. Knowing serving sizes will help you be aware of how much food you are eating at restaurants. The list below tells you how big or small some common portion sizes are based on everyday objects:  ? 1 oz--4 stacked dice.  ? 3 oz--1 deck of cards.  ? 1 tsp--1 die.  ? 1 Tbsp--½ a ping-pong ball.  ? 2 Tbsp--1 ping-pong ball.  ? ½ cup--½ baseball.  ? 1 cup--1 baseball.  Summary  · Calorie counting means keeping track of how many calories you eat and drink each day. If you eat fewer calories than your body needs, you should lose weight.  · A healthy amount of weight to lose per week is usually 1-2 lb (0.5-0.9 kg). This usually means reducing your daily calorie intake by 500-750 calories.  · The number of calories in a food can be found on a Nutrition Facts label. If a food does not have a Nutrition Facts label, try to look up the calories online or ask your dietitian for help.  · Use your calories on foods and drinks that will fill you up, and not on foods and drinks that will leave you hungry.  · Use smaller plates, glasses, and bowls to prevent overeating.      This information is not intended to replace advice given to you by your health care  provider. Make sure you discuss any questions you have with your health care provider.  Document Released: 12/18/2006 Document Revised: 11/17/2017 Document Reviewed: 11/17/2017  [employer] Interactive Patient Education © 2018 [employer]

## 2019-05-01 ENCOUNTER — OFFICE VISIT (OUTPATIENT)
Dept: INTERNAL MEDICINE | Facility: CLINIC | Age: 49
End: 2019-05-01

## 2019-05-01 VITALS
WEIGHT: 246.38 LBS | BODY MASS INDEX: 42.06 KG/M2 | HEART RATE: 84 BPM | HEIGHT: 64 IN | RESPIRATION RATE: 16 BRPM | SYSTOLIC BLOOD PRESSURE: 110 MMHG | OXYGEN SATURATION: 97 % | DIASTOLIC BLOOD PRESSURE: 70 MMHG | TEMPERATURE: 99.4 F

## 2019-05-01 DIAGNOSIS — N64.52 BILATERAL NIPPLE DISCHARGE: ICD-10-CM

## 2019-05-01 DIAGNOSIS — R11.0 NAUSEA: ICD-10-CM

## 2019-05-01 DIAGNOSIS — R10.12 LUQ ABDOMINAL PAIN: Primary | ICD-10-CM

## 2019-05-01 DIAGNOSIS — R07.9 CHEST PAIN, UNSPECIFIED TYPE: ICD-10-CM

## 2019-05-01 DIAGNOSIS — E11.65 TYPE 2 DIABETES MELLITUS WITH HYPERGLYCEMIA, WITH LONG-TERM CURRENT USE OF INSULIN (HCC): ICD-10-CM

## 2019-05-01 DIAGNOSIS — R10.11 RUQ ABDOMINAL PAIN: ICD-10-CM

## 2019-05-01 DIAGNOSIS — N64.4 BREAST PAIN: ICD-10-CM

## 2019-05-01 DIAGNOSIS — L98.9 SKIN LESION: ICD-10-CM

## 2019-05-01 DIAGNOSIS — Z79.4 TYPE 2 DIABETES MELLITUS WITH HYPERGLYCEMIA, WITH LONG-TERM CURRENT USE OF INSULIN (HCC): ICD-10-CM

## 2019-05-01 DIAGNOSIS — Z87.19 HISTORY OF ACUTE PANCREATITIS: ICD-10-CM

## 2019-05-01 DIAGNOSIS — R63.0 DECREASE IN APPETITE: ICD-10-CM

## 2019-05-01 LAB — HBA1C MFR BLD: 9.7 %

## 2019-05-01 PROCEDURE — 93000 ELECTROCARDIOGRAM COMPLETE: CPT | Performed by: FAMILY MEDICINE

## 2019-05-01 PROCEDURE — 83036 HEMOGLOBIN GLYCOSYLATED A1C: CPT | Performed by: FAMILY MEDICINE

## 2019-05-01 PROCEDURE — 99214 OFFICE O/P EST MOD 30 MIN: CPT | Performed by: FAMILY MEDICINE

## 2019-05-01 RX ORDER — ONDANSETRON 8 MG/1
8 TABLET, ORALLY DISINTEGRATING ORAL EVERY 8 HOURS PRN
Qty: 15 TABLET | Refills: 0 | OUTPATIENT
Start: 2019-05-01 | End: 2019-12-12

## 2019-05-01 RX ORDER — PROMETHAZINE HYDROCHLORIDE 25 MG/1
25 TABLET ORAL EVERY 6 HOURS PRN
Qty: 30 TABLET | Refills: 0 | Status: SHIPPED | OUTPATIENT
Start: 2019-05-01 | End: 2020-11-19

## 2019-05-01 NOTE — PROGRESS NOTES
Subjective    Karli Ricardo is a 48 y.o. female here for:    Chief Complaint   Patient presents with   • Back Pain     started on right and left side and gravitated towards mid back   • Shortness of Breath     has had some tightness in chest and shortness of breath with this pain       History of Present Illness     Pain left side of chest more so under breast and wraps around back and some discomfort now on right upper abdomen and pain in middle of back that goes down somewhat. Left breast feels sore. No nipple discharge. No skin changes. Pain is somewhat like she had pancreatitis. Nausea, has not felt well for 2-3 weeks. She's had this pain in the breast and on left two weeks ago. She attributed it to mowing, maybe pulling something. Has been belching a lot. Everything she eats makes her sick.     Still has scaled lesion on outside aspect of lower right leg. Not improved with topical treatments. Similar lesion may be arising on anterior lower left leg. Somewhat itchy.     Initially with soliqua sugars were improving and she was losing weight but weight loss has plateaued and sugars going up. Diabetes mellitus is historically uncontrolled, insulin dependent.    Not up to date on mammogram. No pain in right breast. She has bilateral clear nipple discharge.    The following portions of the patient's history were reviewed and updated as appropriate: allergies, current medications, past family history, past medical history, past social history, past surgical history and problem list.    Health Maintenance   Topic Date Due   • ANNUAL PHYSICAL  06/24/1971   • TDAP/TD VACCINES (1 - Tdap) 06/24/1987   • DIABETIC EYE EXAM  09/13/2016   • MAMMOGRAM  10/14/2016   • DIABETIC FOOT EXAM  04/03/2018   • URINE MICROALBUMIN  04/03/2018   • ZOSTER VACCINE (1 of 2) 06/24/2018   • HEMOGLOBIN A1C  07/07/2019   • INFLUENZA VACCINE  08/01/2019   • COLONOSCOPY  06/01/2026   • PNEUMOCOCCAL VACCINE (19-64 MEDIUM RISK)  Completed   •  "PAP SMEAR  Discontinued       Review of Systems   Constitutional: Positive for activity change and appetite change. Negative for fever.   Respiratory: Positive for shortness of breath.    Cardiovascular: Positive for chest pain.   Gastrointestinal: Positive for abdominal distention, abdominal pain and nausea. Negative for constipation, diarrhea and vomiting.   Musculoskeletal: Positive for back pain.   Skin: Positive for skin lesions. Negative for rash.   Psychiatric/Behavioral: Positive for stress.       Vitals:    05/01/19 1619   BP: 110/70   Pulse: 84   Resp: 16   Temp: 99.4 °F (37.4 °C)   TempSrc: Temporal   SpO2: 97%   Weight: 112 kg (246 lb 6 oz)   Height: 162.6 cm (64.02\")         Objective   Physical Exam   Constitutional: She is oriented to person, place, and time. Vital signs are normal. She appears well-developed and well-nourished. She is active.  Non-toxic appearance. She does not have a sickly appearance. She does not appear ill. No distress. She is morbidly obese.  HENT:   Head: Normocephalic and atraumatic. Hair is normal.   Right Ear: Hearing and external ear normal.   Left Ear: Hearing and external ear normal.   Nose: Nose normal.   Mouth/Throat: Mucous membranes are not dry. No trismus in the jaw.   Eyes: Conjunctivae, EOM and lids are normal. Pupils are equal, round, and reactive to light. No scleral icterus.   Neck: Phonation normal. Neck supple. No tracheal deviation present.   Cardiovascular: Normal rate, regular rhythm and normal heart sounds.   No murmur heard.  Pulmonary/Chest: Effort normal and breath sounds normal.   Abdominal: Soft. Bowel sounds are normal. She exhibits distension. She exhibits no mass. There is tenderness in the right upper quadrant, right lower quadrant, epigastric area and left upper quadrant. There is no rigidity, no rebound, no guarding and no CVA tenderness.   Musculoskeletal: She exhibits no edema or deformity.        Thoracic back: She exhibits no tenderness. "   Neurological: She is alert and oriented to person, place, and time. She displays no tremor. No cranial nerve deficit. She exhibits normal muscle tone. Gait normal.   Skin: Skin is warm. Turgor is normal. No rash noted. She is not diaphoretic. No cyanosis. No pallor. Nails show no clubbing.        Psychiatric: She has a normal mood and affect. Her speech is normal and behavior is normal. Judgment and thought content normal. Cognition and memory are normal.   Nursing note and vitals reviewed.        ECG 12 Lead  Date/Time: 5/1/2019 4:42 PM  Performed by: Lovely Loyd MD  Authorized by: Lovely Loyd MD   Comparison: compared with previous ECG from 9/11/2016  Similar to previous ECG  Rhythm: sinus rhythm  Rate: normal  BPM: 76  Conduction: conduction normal  ST Segments: ST segments normal  T Waves: T waves normal  QRS axis: normal    Clinical impression: normal ECG          Lab Results   Component Value Date    HGBA1C 9.7 05/01/2019         Assessment/Plan     Problem List Items Addressed This Visit        Endocrine    Type 2 diabetes mellitus with hyperglycemia, with long-term current use of insulin (CMS/[facility])    Relevant Orders    POC Glycosylated Hemoglobin (Hb A1C) (Completed)      Other Visit Diagnoses     LUQ abdominal pain    -  Primary    RUQ abdominal pain        Nausea        Relevant Medications    ondansetron ODT (ZOFRAN ODT) 8 MG disintegrating tablet    promethazine (PHENERGAN) 25 MG tablet    Other Relevant Orders    Lipase    Amylase    Comprehensive Metabolic Panel    CBC & Differential    Calcium, Ionized    Decrease in appetite        Relevant Orders    CBC & Differential    History of acute pancreatitis        Relevant Orders    Lipase    Amylase    Chest pain, unspecified type        Relevant Orders    ECG 12 Lead    Breast pain        Relevant Orders    Mammo Diagnostic Bilateral With CAD    Bilateral nipple discharge        Relevant Orders    Mammo Diagnostic Bilateral With CAD     Skin lesion        Relevant Orders    CT Abdomen Pelvis Without Contrast    Ambulatory Referral to Dermatology          · Likely pancreatitis, stressed need to go to ER if worsens. She does not look sick enough at this time to send. Push fluids, stay hydrated. No appetite now, hold food until appetite returns. Stop soliqua. If this is proven to be pancreatitis the medicine cannot be restarted due to risk of recurrent pancreatitis.   · Discussed need for diagnostic imaging of breasts  ·     Return in about 2 weeks (around 5/15/2019) for Follow up on current issues. or sooner if needed.    Lovely Loyd MD

## 2019-05-02 ENCOUNTER — APPOINTMENT (OUTPATIENT)
Dept: LAB | Facility: HOSPITAL | Age: 49
End: 2019-05-02

## 2019-05-02 ENCOUNTER — HOSPITAL ENCOUNTER (OUTPATIENT)
Dept: CT IMAGING | Facility: HOSPITAL | Age: 49
Discharge: HOME | End: 2019-05-02
Admitting: FAMILY MEDICINE

## 2019-05-02 LAB
ALBUMIN SERPL-MCNC: 4.2 G/DL (ref 3.5–5)
ALBUMIN/GLOB SERPL: 1.1 G/DL (ref 1–2)
ALP SERPL-CCNC: 115 U/L (ref 38–126)
ALT SERPL W P-5'-P-CCNC: 34 U/L (ref 13–69)
AMYLASE SERPL-CCNC: 103 U/L (ref 30–110)
ANION GAP SERPL CALCULATED.3IONS-SCNC: 13.9 MMOL/L (ref 10–20)
AST SERPL-CCNC: 31 U/L (ref 15–46)
BASOPHILS # BLD AUTO: 0.07 10*3/MM3 (ref 0–0.2)
BASOPHILS NFR BLD AUTO: 0.6 % (ref 0–1.5)
BILIRUB SERPL-MCNC: 0.5 MG/DL (ref 0.2–1.3)
BUN BLD-MCNC: 9 MG/DL (ref 7–20)
BUN/CREAT SERPL: 12.9 (ref 7.1–23.5)
CALCIUM SPEC-SCNC: 9.3 MG/DL (ref 8.4–10.2)
CHLORIDE SERPL-SCNC: 99 MMOL/L (ref 98–107)
CO2 SERPL-SCNC: 29 MMOL/L (ref 26–30)
CREAT BLD-MCNC: 0.7 MG/DL (ref 0.6–1.3)
DEPRECATED RDW RBC AUTO: 42.8 FL (ref 37–54)
EOSINOPHIL # BLD AUTO: 0.12 10*3/MM3 (ref 0–0.4)
EOSINOPHIL NFR BLD AUTO: 1.1 % (ref 0.3–6.2)
ERYTHROCYTE [DISTWIDTH] IN BLOOD BY AUTOMATED COUNT: 13.8 % (ref 12.3–15.4)
GFR SERPL CREATININE-BSD FRML MDRD: 89 ML/MIN/1.73
GLOBULIN UR ELPH-MCNC: 3.7 GM/DL
GLUCOSE BLD-MCNC: 240 MG/DL (ref 74–98)
HCT VFR BLD AUTO: 47 % (ref 34–46.6)
HGB BLD-MCNC: 15.6 G/DL (ref 12–15.9)
IMM GRANULOCYTES # BLD AUTO: 0.08 10*3/MM3 (ref 0–0.05)
IMM GRANULOCYTES NFR BLD AUTO: 0.7 % (ref 0–0.5)
LIPASE SERPL-CCNC: 448 U/L (ref 23–300)
LYMPHOCYTES # BLD AUTO: 3.44 10*3/MM3 (ref 0.7–3.1)
LYMPHOCYTES NFR BLD AUTO: 31.9 % (ref 19.6–45.3)
MCH RBC QN AUTO: 28.3 PG (ref 26.6–33)
MCHC RBC AUTO-ENTMCNC: 33.2 G/DL (ref 31.5–35.7)
MCV RBC AUTO: 85.1 FL (ref 79–97)
MONOCYTES # BLD AUTO: 0.65 10*3/MM3 (ref 0.1–0.9)
MONOCYTES NFR BLD AUTO: 6 % (ref 5–12)
NEUTROPHILS # BLD AUTO: 6.43 10*3/MM3 (ref 1.7–7)
NEUTROPHILS NFR BLD AUTO: 59.7 % (ref 42.7–76)
NRBC BLD AUTO-RTO: 0 /100 WBC (ref 0–0.2)
PLATELET # BLD AUTO: 265 10*3/MM3 (ref 140–450)
PMV BLD AUTO: 10.3 FL (ref 6–12)
POTASSIUM BLD-SCNC: 3.9 MMOL/L (ref 3.5–5.1)
PROT SERPL-MCNC: 7.9 G/DL (ref 6.3–8.2)
RBC # BLD AUTO: 5.52 10*6/MM3 (ref 3.77–5.28)
SODIUM BLD-SCNC: 138 MMOL/L (ref 137–145)
WBC NRBC COR # BLD: 10.79 10*3/MM3 (ref 3.4–10.8)

## 2019-05-02 PROCEDURE — 85025 COMPLETE CBC W/AUTO DIFF WBC: CPT | Performed by: FAMILY MEDICINE

## 2019-05-02 PROCEDURE — 82330 ASSAY OF CALCIUM: CPT | Performed by: FAMILY MEDICINE

## 2019-05-02 PROCEDURE — 80053 COMPREHEN METABOLIC PANEL: CPT | Performed by: FAMILY MEDICINE

## 2019-05-02 PROCEDURE — 83690 ASSAY OF LIPASE: CPT | Performed by: FAMILY MEDICINE

## 2019-05-02 PROCEDURE — 82150 ASSAY OF AMYLASE: CPT | Performed by: FAMILY MEDICINE

## 2019-05-02 PROCEDURE — 74176 CT ABD & PELVIS W/O CONTRAST: CPT

## 2019-05-02 PROCEDURE — 36415 COLL VENOUS BLD VENIPUNCTURE: CPT | Performed by: FAMILY MEDICINE

## 2019-05-03 ENCOUNTER — APPOINTMENT (OUTPATIENT)
Dept: CT IMAGING | Facility: HOSPITAL | Age: 49
End: 2019-05-03

## 2019-05-03 ENCOUNTER — TELEPHONE (OUTPATIENT)
Dept: INTERNAL MEDICINE | Facility: CLINIC | Age: 49
End: 2019-05-03

## 2019-05-03 ENCOUNTER — APPOINTMENT (OUTPATIENT)
Dept: GENERAL RADIOLOGY | Facility: HOSPITAL | Age: 49
End: 2019-05-03

## 2019-05-03 ENCOUNTER — HOSPITAL ENCOUNTER (EMERGENCY)
Facility: HOSPITAL | Age: 49
Discharge: HOME | End: 2019-05-03
Attending: STUDENT IN AN ORGANIZED HEALTH CARE EDUCATION/TRAINING PROGRAM | Admitting: STUDENT IN AN ORGANIZED HEALTH CARE EDUCATION/TRAINING PROGRAM

## 2019-05-03 VITALS
BODY MASS INDEX: 42.27 KG/M2 | HEART RATE: 70 BPM | WEIGHT: 247.6 LBS | OXYGEN SATURATION: 96 % | SYSTOLIC BLOOD PRESSURE: 142 MMHG | HEIGHT: 64 IN | DIASTOLIC BLOOD PRESSURE: 99 MMHG | TEMPERATURE: 98.1 F | RESPIRATION RATE: 20 BRPM

## 2019-05-03 DIAGNOSIS — R11.2 NON-INTRACTABLE VOMITING WITH NAUSEA, UNSPECIFIED VOMITING TYPE: ICD-10-CM

## 2019-05-03 DIAGNOSIS — K21.9 GASTROESOPHAGEAL REFLUX DISEASE, ESOPHAGITIS PRESENCE NOT SPECIFIED: ICD-10-CM

## 2019-05-03 DIAGNOSIS — R10.10 PAIN OF UPPER ABDOMEN: ICD-10-CM

## 2019-05-03 DIAGNOSIS — K50.00 TERMINAL ILEITIS WITHOUT COMPLICATION (HCC): Primary | ICD-10-CM

## 2019-05-03 LAB
ALBUMIN SERPL-MCNC: 4 G/DL (ref 3.5–5)
ALBUMIN/GLOB SERPL: 1 G/DL (ref 1–2)
ALP SERPL-CCNC: 114 U/L (ref 38–126)
ALT SERPL W P-5'-P-CCNC: 25 U/L (ref 13–69)
ANION GAP SERPL CALCULATED.3IONS-SCNC: 11.1 MMOL/L (ref 10–20)
AST SERPL-CCNC: 29 U/L (ref 15–46)
BASOPHILS # BLD AUTO: 0.07 10*3/MM3 (ref 0–0.2)
BASOPHILS NFR BLD AUTO: 0.6 % (ref 0–1.5)
BILIRUB SERPL-MCNC: 0.4 MG/DL (ref 0.2–1.3)
BILIRUB UR QL STRIP: NEGATIVE
BUN BLD-MCNC: 9 MG/DL (ref 7–20)
BUN/CREAT SERPL: 15 (ref 7.1–23.5)
CA-I SERPL ISE-MCNC: 5 MG/DL (ref 4.5–5.6)
CALCIUM SPEC-SCNC: 9.3 MG/DL (ref 8.4–10.2)
CHLORIDE SERPL-SCNC: 102 MMOL/L (ref 98–107)
CLARITY UR: CLEAR
CO2 SERPL-SCNC: 29 MMOL/L (ref 26–30)
COLOR UR: YELLOW
CREAT BLD-MCNC: 0.6 MG/DL (ref 0.6–1.3)
D-LACTATE SERPL-SCNC: 1.5 MMOL/L (ref 0.5–2)
DEPRECATED RDW RBC AUTO: 41.8 FL (ref 37–54)
EOSINOPHIL # BLD AUTO: 0.15 10*3/MM3 (ref 0–0.4)
EOSINOPHIL NFR BLD AUTO: 1.3 % (ref 0.3–6.2)
ERYTHROCYTE [DISTWIDTH] IN BLOOD BY AUTOMATED COUNT: 13.7 % (ref 12.3–15.4)
ERYTHROCYTE [SEDIMENTATION RATE] IN BLOOD: 26 MM/HR (ref 0–20)
GFR SERPL CREATININE-BSD FRML MDRD: 106 ML/MIN/1.73
GLOBULIN UR ELPH-MCNC: 3.9 GM/DL
GLUCOSE BLD-MCNC: 266 MG/DL (ref 74–98)
GLUCOSE UR STRIP-MCNC: ABNORMAL MG/DL
HCT VFR BLD AUTO: 46.3 % (ref 34–46.6)
HGB BLD-MCNC: 15.6 G/DL (ref 12–15.9)
HGB UR QL STRIP.AUTO: NEGATIVE
IMM GRANULOCYTES # BLD AUTO: 0.08 10*3/MM3 (ref 0–0.05)
IMM GRANULOCYTES NFR BLD AUTO: 0.7 % (ref 0–0.5)
KETONES UR QL STRIP: NEGATIVE
LEUKOCYTE ESTERASE UR QL STRIP.AUTO: NEGATIVE
LIPASE SERPL-CCNC: 284 U/L (ref 23–300)
LYMPHOCYTES # BLD AUTO: 3 10*3/MM3 (ref 0.7–3.1)
LYMPHOCYTES NFR BLD AUTO: 26.4 % (ref 19.6–45.3)
MAGNESIUM SERPL-MCNC: 2.1 MG/DL (ref 1.6–2.3)
MCH RBC QN AUTO: 28.1 PG (ref 26.6–33)
MCHC RBC AUTO-ENTMCNC: 33.7 G/DL (ref 31.5–35.7)
MCV RBC AUTO: 83.4 FL (ref 79–97)
MONOCYTES # BLD AUTO: 0.72 10*3/MM3 (ref 0.1–0.9)
MONOCYTES NFR BLD AUTO: 6.3 % (ref 5–12)
NEUTROPHILS # BLD AUTO: 7.36 10*3/MM3 (ref 1.7–7)
NEUTROPHILS NFR BLD AUTO: 64.7 % (ref 42.7–76)
NITRITE UR QL STRIP: NEGATIVE
NRBC BLD AUTO-RTO: 0 /100 WBC (ref 0–0.2)
PH UR STRIP.AUTO: <=5 [PH] (ref 5–8)
PLATELET # BLD AUTO: 234 10*3/MM3 (ref 140–450)
PMV BLD AUTO: 10.3 FL (ref 6–12)
POTASSIUM BLD-SCNC: 4.1 MMOL/L (ref 3.5–5.1)
PROT SERPL-MCNC: 7.9 G/DL (ref 6.3–8.2)
PROT UR QL STRIP: NEGATIVE
RBC # BLD AUTO: 5.55 10*6/MM3 (ref 3.77–5.28)
SODIUM BLD-SCNC: 138 MMOL/L (ref 137–145)
SP GR UR STRIP: 1.02 (ref 1–1.03)
TROPONIN I SERPL-MCNC: <0.012 NG/ML (ref 0–0.03)
UROBILINOGEN UR QL STRIP: ABNORMAL
WBC NRBC COR # BLD: 11.38 10*3/MM3 (ref 3.4–10.8)

## 2019-05-03 PROCEDURE — 80053 COMPREHEN METABOLIC PANEL: CPT | Performed by: PHYSICIAN ASSISTANT

## 2019-05-03 PROCEDURE — 84484 ASSAY OF TROPONIN QUANT: CPT | Performed by: PHYSICIAN ASSISTANT

## 2019-05-03 PROCEDURE — 96374 THER/PROPH/DIAG INJ IV PUSH: CPT

## 2019-05-03 PROCEDURE — 74177 CT ABD & PELVIS W/CONTRAST: CPT

## 2019-05-03 PROCEDURE — 99284 EMERGENCY DEPT VISIT MOD MDM: CPT

## 2019-05-03 PROCEDURE — 83735 ASSAY OF MAGNESIUM: CPT | Performed by: PHYSICIAN ASSISTANT

## 2019-05-03 PROCEDURE — 83690 ASSAY OF LIPASE: CPT | Performed by: PHYSICIAN ASSISTANT

## 2019-05-03 PROCEDURE — 25010000002 ONDANSETRON PER 1 MG: Performed by: PHYSICIAN ASSISTANT

## 2019-05-03 PROCEDURE — 96375 TX/PRO/DX INJ NEW DRUG ADDON: CPT

## 2019-05-03 PROCEDURE — 83605 ASSAY OF LACTIC ACID: CPT | Performed by: PHYSICIAN ASSISTANT

## 2019-05-03 PROCEDURE — 93005 ELECTROCARDIOGRAM TRACING: CPT | Performed by: PHYSICIAN ASSISTANT

## 2019-05-03 PROCEDURE — 25010000002 IOPAMIDOL 61 % SOLUTION: Performed by: STUDENT IN AN ORGANIZED HEALTH CARE EDUCATION/TRAINING PROGRAM

## 2019-05-03 PROCEDURE — 81003 URINALYSIS AUTO W/O SCOPE: CPT | Performed by: PHYSICIAN ASSISTANT

## 2019-05-03 PROCEDURE — 96376 TX/PRO/DX INJ SAME DRUG ADON: CPT

## 2019-05-03 PROCEDURE — 85025 COMPLETE CBC W/AUTO DIFF WBC: CPT | Performed by: PHYSICIAN ASSISTANT

## 2019-05-03 PROCEDURE — 71046 X-RAY EXAM CHEST 2 VIEWS: CPT

## 2019-05-03 PROCEDURE — 85651 RBC SED RATE NONAUTOMATED: CPT | Performed by: PHYSICIAN ASSISTANT

## 2019-05-03 PROCEDURE — 25010000002 PROMETHAZINE PER 50 MG: Performed by: STUDENT IN AN ORGANIZED HEALTH CARE EDUCATION/TRAINING PROGRAM

## 2019-05-03 RX ORDER — MORPHINE SULFATE 4 MG/ML
4 INJECTION, SOLUTION INTRAMUSCULAR; INTRAVENOUS ONCE
Status: COMPLETED | OUTPATIENT
Start: 2019-05-03 | End: 2019-05-03

## 2019-05-03 RX ORDER — SODIUM CHLORIDE 0.9 % (FLUSH) 0.9 %
10 SYRINGE (ML) INJECTION AS NEEDED
Status: DISCONTINUED | OUTPATIENT
Start: 2019-05-03 | End: 2019-05-03 | Stop reason: HOSPADM

## 2019-05-03 RX ORDER — PROMETHAZINE HYDROCHLORIDE 25 MG/ML
12.5 INJECTION, SOLUTION INTRAMUSCULAR; INTRAVENOUS ONCE
Status: COMPLETED | OUTPATIENT
Start: 2019-05-03 | End: 2019-05-03

## 2019-05-03 RX ORDER — TRAMADOL HYDROCHLORIDE 50 MG/1
50 TABLET ORAL EVERY 6 HOURS PRN
Qty: 15 TABLET | Refills: 0 | Status: SHIPPED | OUTPATIENT
Start: 2019-05-03 | End: 2019-10-01

## 2019-05-03 RX ORDER — ONDANSETRON 2 MG/ML
4 INJECTION INTRAMUSCULAR; INTRAVENOUS ONCE
Status: COMPLETED | OUTPATIENT
Start: 2019-05-03 | End: 2019-05-03

## 2019-05-03 RX ORDER — FAMOTIDINE 10 MG/ML
20 INJECTION, SOLUTION INTRAVENOUS ONCE
Status: COMPLETED | OUTPATIENT
Start: 2019-05-03 | End: 2019-05-03

## 2019-05-03 RX ADMIN — SODIUM CHLORIDE 1000 ML: 9 INJECTION, SOLUTION INTRAVENOUS at 17:36

## 2019-05-03 RX ADMIN — MORPHINE SULFATE 4 MG: 4 INJECTION, SOLUTION INTRAMUSCULAR; INTRAVENOUS at 17:36

## 2019-05-03 RX ADMIN — FAMOTIDINE 20 MG: 10 INJECTION, SOLUTION INTRAVENOUS at 19:02

## 2019-05-03 RX ADMIN — PROMETHAZINE HYDROCHLORIDE 12.5 MG: 25 INJECTION INTRAMUSCULAR; INTRAVENOUS at 19:02

## 2019-05-03 RX ADMIN — ONDANSETRON 4 MG: 2 INJECTION INTRAMUSCULAR; INTRAVENOUS at 17:36

## 2019-05-03 RX ADMIN — MORPHINE SULFATE 4 MG: 4 INJECTION, SOLUTION INTRAMUSCULAR; INTRAVENOUS at 19:02

## 2019-05-03 RX ADMIN — IOPAMIDOL 100 ML: 612 INJECTION, SOLUTION INTRAVENOUS at 18:53

## 2019-05-03 NOTE — TELEPHONE ENCOUNTER
Patient called and states that she is not feeling any better from pancreatitis and that she is actually feeling worse. Patient would not go into detail on what worse meant but just that she was in more pain than yesterday. She would like to speak with a nurse.

## 2019-05-03 NOTE — TELEPHONE ENCOUNTER
I suggest she go to ER as labs were consistent with pancreatitis, likely needs admission. Iv fluids.

## 2019-05-03 NOTE — ED PROVIDER NOTES
Subjective   48-year-old female presents to the emergency room with abdominal pain and a positive CT for pancreatitis per Dr. Loyd, who sent her over to the ER.  Her lipase was 480 and her amylase was just over 100 yesterday.  She has a history of acid reflux, arthritis, asthma, diabetes, diverticulitis, diverticulosis, gallstones, low back pain, neuromuscular disorder, thyroid disease. She states the pain started 2 days ago. She has pain all across her upper abdomen with belching and burping. She states she has had pancreatitis in the past. She states she has had her GB removed. Her PCP feels her pancreatitis has been caused by her DM. She states her Blood glucose is up in the 330's. She takes AM insulin and metformin. She state she has been vomiting and has not been able to keep much food down. She had some diarrhea today, but describes it has brown.  She has only taken tylenol today and none of her other medications and was told by her PCP to not take her insulin since she had pancreatitis and was not eating well. She states she has had chest pain intermittently over the last several days and it worsens when her abdominal pain worsens.  She denies any cardiac history. She states she can not describe the pain. She states the abdominal pain goes all the way around to her back, bilaterally.             Review of Systems   Constitutional: Positive for activity change, appetite change, chills and fever (101.5 at home 2 days ago). Negative for fatigue.   HENT: Negative for congestion, facial swelling, sinus pressure, sore throat and trouble swallowing.    Eyes: Negative for pain, discharge and redness.   Respiratory: Negative for chest tightness, shortness of breath and wheezing.    Cardiovascular: Positive for chest pain (when she has abdominal pain). Negative for palpitations.   Gastrointestinal: Positive for abdominal pain (across the entire upper abdomen and around to the back), diarrhea, nausea and vomiting.    Genitourinary: Negative for decreased urine volume, difficulty urinating, dysuria, frequency, hematuria and urgency.   Musculoskeletal: Positive for back pain (radiating from the abdomen). Negative for arthralgias, gait problem, myalgias, neck pain and neck stiffness.   Skin: Negative for color change and rash.   Neurological: Positive for dizziness and light-headedness. Negative for syncope and weakness.   Hematological: Does not bruise/bleed easily.       Past Medical History:   Diagnosis Date   • Acid reflux    • Arthritis    • Asthma    • Chronic bronchitis (CMS/HCC)    • Colon polyp    • Diabetes (CMS/HCC)    • Diverticulitis    • Diverticulosis    • Fracture     lateral left ankle   • Gallstones    • Gout    • Headache    • Hypertension    • Low back pain    • Neuromuscular disorder (CMS/HCC)    • Thyroid disease    • Visual impairment        No Known Allergies    Past Surgical History:   Procedure Laterality Date   •  SECTION     • CHOLECYSTECTOMY     • COLONOSCOPY  ,    DR LYLES,DR MARISCAL   • DILATATION AND CURETTAGE     • ENDOSCOPY      DR MARISCAL BEREA   • FOOT SURGERY     • HYSTERECTOMY         Family History   Problem Relation Age of Onset   • Arthritis Mother    • Mental illness Mother    • Migraines Mother    • Osteoporosis Mother    • Thyroid disease Mother    • Depression Mother    • Heart disease Mother    • Colon cancer Father    • Cancer Father    • Thyroid disease Sister        Social History     Socioeconomic History   • Marital status:      Spouse name: Not on file   • Number of children: Not on file   • Years of education: Not on file   • Highest education level: Not on file   Tobacco Use   • Smoking status: Current Every Day Smoker     Packs/day: 1.00     Years: 20.00     Pack years: 20.00   • Smokeless tobacco: Former User   Substance and Sexual Activity   • Alcohol use: No   • Drug use: No   • Sexual activity: Yes     Partners: Male     Birth  control/protection: Surgical           Objective   Physical Exam   Constitutional: She is oriented to person, place, and time. She appears well-developed and well-nourished. She is cooperative.  Non-toxic appearance. She does not have a sickly appearance. She appears ill. No distress.   HENT:   Head: Normocephalic and atraumatic.   Right Ear: Hearing normal.   Left Ear: Hearing normal.   Nose: Nose normal.   Mouth/Throat: Oropharynx is clear and moist. No oropharyngeal exudate.   Eyes: EOM are normal. Pupils are equal, round, and reactive to light.   Neck: Normal range of motion. No neck rigidity.   Cardiovascular: Normal rate, regular rhythm and intact distal pulses.   Pulmonary/Chest: Effort normal and breath sounds normal. No respiratory distress.   Abdominal: Soft. Bowel sounds are increased. There is tenderness in the right upper quadrant, epigastric area and left upper quadrant. There is guarding. There is no rigidity, no rebound and no CVA tenderness.   Musculoskeletal: Normal range of motion.   Neurological: She is alert and oriented to person, place, and time. She has normal strength. She is not disoriented. No sensory deficit. Coordination and gait normal. GCS eye subscore is 4. GCS verbal subscore is 5. GCS motor subscore is 6.   Skin: Skin is warm, dry and intact. Capillary refill takes less than 2 seconds. No rash noted.   Psychiatric: She has a normal mood and affect. Her speech is normal and behavior is normal.   Nursing note and vitals reviewed.      Procedures      Initial plan of care and expected weight times explained to the patient and their family.  Najma Connell PA-C        ED Course  ED Course as of May 03 1929   Fri May 03, 2019   1753 EKG shows a sinus rhythm with a rate of 64.  Nonspecific ST segments in leads I and aVL.  Borderline EKG.  Interpreted by me.  [DT]      ED Course User Index  [DT] Renard Contreras MD    CT FROM YESTERDAY WITHOUT CONTRAST:  PROCEDURE: CT ABDOMEN PELVIS  WO CONTRAST-     HISTORY: Abdominal distension     COMPARISON: 11/03/2017.     PROCEDURE: Axial images were obtained from the lung bases through the  pubic symphysis without intravenous contrast. .      FINDINGS:      ABDOMEN: The lung bases are clear. The heart size is normal. The limited  noncontrast images of the liver is diffusely hypodense consistent with  fatty infiltration. The patient is status post cholecystectomy. The  spleen is normal. No adrenal masses are seen.  The pancreas has an  unremarkable unenhanced appearance.. The aorta is normal in caliber.  There is no significant free fluid or adenopathy.  There is no  nephrolithiasis. There is no hydronephrosis. Limited noncontrast images  of the bowel are unremarkable.     PELVIS: The appendix is normal. The urinary bladder is unremarkable. The  patient is status post hysterectomy. There is no significant fluid or  adenopathy.     IMPRESSION:  No acute intra-abdominal or pelvic process.              1746.45 mGy.cm.  37.26 mGy     This study was performed with techniques to keep radiation doses as low  as reasonably achievable (ALARA). Individualized dose reduction  techniques using automated exposure control or adjustment of mA and/or  kV according to the patient size were employed.      This report was finalized on 5/2/2019 3:36 PM by Jordan Herrera M.D..  5:27 PM-IVF, pain medications, IV, UA, labs all ordered. Patient has a CT scan from yesterday.   5:28 PM-the patient has a negative CT abdomen from Yesterday.      6:35 PM-the patient states she has had no relief from the abdominal pain with the morphine and no relief from nausea with the zofran. I will order additional morphine and phenergan. I will order CT of abd/pelvis with IV contrast. I have advised the patient and her family and they verbalized understanding.   Results   CT Abdomen Pelvis With Contrast (Order [id number])   5/3/2019  7:08 PM - Interface, Rad Results [place] [facility]     Narrative      FINAL REPORT    TECHNIQUE:  Multiple contiguous transaxial slices through the abdomen and  pelvis were obtained after the administration of contrast.    CLINICAL HISTORY:  abdominal pain, worsening, nausea, vomiting    FINDINGS:  There is bilateral lower lobe atelectasis.  The liver is low in  attenuation consistent with fatty infiltration.  The spleen,  pancreas, adrenal glands and kidneys are unremarkable. The bowel  gas pattern is nonobstructive. There is mild wall thickening of  the terminal ileum.  There is no evidence for appendicitis.  The  bladder is normal in contour. There is no free fluid, free air  or adenopathy.   Impression     Mild wall thickening terminal ileum.  Cannot exclude an ileitis  which may be infectious or inflammatory.  Negative for  obstruction.  Recommend appropriate clinical follow-up  Fatty  liver    Authenticated by Seven Romero MD on 05/03/2019 07:07:53 PM     Lab Results (last 24 hours)     Procedure Component Value Units Date/Time    CBC & Differential [[id number]] Collected:  05/03/19 1737    Specimen:  Blood Updated:  05/03/19 1749    Narrative:       The following orders were created for panel order CBC & Differential.  Procedure                               Abnormality         Status                     ---------                               -----------         ------                     CBC Auto Differential[[id number]]        Abnormal            Final result                 Please view results for these tests on the individual orders.    Comprehensive Metabolic Panel [[id number]]  (Abnormal) Collected:  05/03/19 1737    Specimen:  Blood Updated:  05/03/19 1808     Glucose 266 mg/dL      Comment: Glucose >180, Hemoglobin A1C recommended.        BUN 9 mg/dL      Creatinine 0.60 mg/dL      Sodium 138 mmol/L      Potassium 4.1 mmol/L      Chloride 102 mmol/L      CO2 29.0 mmol/L      Calcium 9.3 mg/dL      Total Protein 7.9 g/dL      Albumin 4.00 g/dL      ALT (SGPT) 25  U/L      AST (SGOT) 29 U/L      Alkaline Phosphatase 114 U/L      Total Bilirubin 0.4 mg/dL      eGFR Non African Amer 106 mL/min/1.73      Globulin 3.9 gm/dL      A/G Ratio 1.0 g/dL      BUN/Creatinine Ratio 15.0     Anion Gap 11.1 mmol/L     Narrative:       GFR Normal >60  Chronic Kidney Disease <60  Kidney Failure <15    Lipase [[id number]]  (Normal) Collected:  05/03/19 1737    Specimen:  Blood Updated:  05/03/19 1808     Lipase 284 U/L     Sedimentation Rate [[id number]]  (Abnormal) Collected:  05/03/19 1737    Specimen:  Blood Updated:  05/03/19 1854     Sed Rate 26 mm/hr     Lactic Acid, Plasma [[id number]]  (Normal) Collected:  05/03/19 1737    Specimen:  Blood Updated:  05/03/19 1803     Lactate 1.5 mmol/L     Magnesium [[id number]]  (Normal) Collected:  05/03/19 1737    Specimen:  Blood Updated:  05/03/19 1808     Magnesium 2.1 mg/dL     CBC Auto Differential [[id number]]  (Abnormal) Collected:  05/03/19 1737    Specimen:  Blood Updated:  05/03/19 1749     WBC 11.38 10*3/mm3      RBC 5.55 10*6/mm3      Hemoglobin 15.6 g/dL      Hematocrit 46.3 %      MCV 83.4 fL      MCH 28.1 pg      MCHC 33.7 g/dL      RDW 13.7 %      RDW-SD 41.8 fl      MPV 10.3 fL      Platelets 234 10*3/mm3      Neutrophil % 64.7 %      Lymphocyte % 26.4 %      Monocyte % 6.3 %      Eosinophil % 1.3 %      Basophil % 0.6 %      Immature Grans % 0.7 %      Neutrophils, Absolute 7.36 10*3/mm3      Lymphocytes, Absolute 3.00 10*3/mm3      Monocytes, Absolute 0.72 10*3/mm3      Eosinophils, Absolute 0.15 10*3/mm3      Basophils, Absolute 0.07 10*3/mm3      Immature Grans, Absolute 0.08 10*3/mm3      nRBC 0.0 /100 WBC     Troponin [[id number]]  (Normal) Collected:  05/03/19 1737    Specimen:  Blood Updated:  05/03/19 1843     Troponin I <0.012 ng/mL     Narrative:       Normal Patient Upper Reference Limit (URL) (99th Percentile)=0.03 ng/mL   Non-AMI Illness Reference Limit=0.03-0.11 ng/mL   AMI Confirmation=0.12 ng/mL and above    Urinalysis  With Culture If Indicated - Urine, Clean Catch [[id number]]  (Abnormal) Collected:  05/03/19 1827    Specimen:  Urine, Clean Catch Updated:  05/03/19 1842     Color, UA Yellow     Appearance, UA Clear     pH, UA <=5.0     Specific Gravity, UA 1.021     Glucose,  mg/dL (1+)     Ketones, UA Negative     Bilirubin, UA Negative     Blood, UA Negative     Protein, UA Negative     Leuk Esterase, UA Negative     Nitrite, UA Negative     Urobilinogen, UA 1.0 E.U./dL    Narrative:       Urine microscopic not indicated.      7:21 PM-patient has what appears to be a mild inflammation of the ileum.  This relates to ileitis.  Crohn's disease can cause ileitis but this is cannot be diagnosed at the ER.  She is to follow-up with GI.  We have no on-call GI but I will give her the name and number of the doctor here in the area.  She needs to follow-up with her primary care physician.  She needs to eat a bland diet.  I will discharge her home with Zofran and tramadol.  She needs to eat and drink.  She needs to take it easy on her diet especially if she has an upset stomach.  I have discussed the results of the labs and CT scan with the patient and she verbalized understanding.    I have reviewed all labs, and all imaging that has been ordered for this patient.  I have discussed the results of this testing with the patient.  Together the patient, their family and I discussed the plan for treatment and disposition.        Red flags, indicating immediate need to return to the emergency room, were discussed with the patient and/or the patient's family and they verbalized understanding.  The patient and/or the family were encouraged to return to the emergency room for any worsening or concerning symptoms.    MDM  Number of Diagnoses or Management Options  Gastroesophageal reflux disease, esophagitis presence not specified: new and requires workup  Non-intractable vomiting with nausea, unspecified vomiting type: new and requires  workup  Pain of upper abdomen: new and requires workup  Terminal ileitis without complication (CMS/HCC): new and requires workup     Amount and/or Complexity of Data Reviewed  Clinical lab tests: reviewed and ordered  Tests in the radiology section of CPT®: reviewed and ordered  Discuss the patient with other providers: yes (Dr. SPENSER Osullivan)  Independent visualization of images, tracings, or specimens: yes    Risk of Complications, Morbidity, and/or Mortality  Presenting problems: moderate  Diagnostic procedures: moderate  Management options: moderate    Patient Progress  Patient progress: improved        Final diagnoses:   Terminal ileitis without complication (CMS/HCC)   Non-intractable vomiting with nausea, unspecified vomiting type   Pain of upper abdomen   Gastroesophageal reflux disease, esophagitis presence not specified            Najma Connell PA-C  05/03/19 1927

## 2019-05-17 ENCOUNTER — HOSPITAL ENCOUNTER (OUTPATIENT)
Dept: MAMMOGRAPHY | Facility: HOSPITAL | Age: 49
Discharge: HOME | End: 2019-05-17
Admitting: FAMILY MEDICINE

## 2019-05-17 PROCEDURE — G0279 TOMOSYNTHESIS, MAMMO: HCPCS

## 2019-05-17 PROCEDURE — 77066 DX MAMMO INCL CAD BI: CPT

## 2019-06-13 ENCOUNTER — OFFICE VISIT (OUTPATIENT)
Dept: GASTROENTEROLOGY | Facility: CLINIC | Age: 49
End: 2019-06-13

## 2019-06-13 VITALS
RESPIRATION RATE: 16 BRPM | BODY MASS INDEX: 41.15 KG/M2 | DIASTOLIC BLOOD PRESSURE: 70 MMHG | TEMPERATURE: 98.2 F | WEIGHT: 241 LBS | HEIGHT: 64 IN | HEART RATE: 73 BPM | SYSTOLIC BLOOD PRESSURE: 144 MMHG

## 2019-06-13 DIAGNOSIS — R10.84 GENERALIZED ABDOMINAL PAIN: Chronic | ICD-10-CM

## 2019-06-13 DIAGNOSIS — K52.9 ILEITIS: Chronic | ICD-10-CM

## 2019-06-13 DIAGNOSIS — R11.0 NAUSEA: Chronic | ICD-10-CM

## 2019-06-13 DIAGNOSIS — R19.7 DIARRHEA, UNSPECIFIED TYPE: Chronic | ICD-10-CM

## 2019-06-13 DIAGNOSIS — R10.31 RIGHT LOWER QUADRANT ABDOMINAL PAIN: Primary | Chronic | ICD-10-CM

## 2019-06-13 DIAGNOSIS — R12 HEARTBURN: Chronic | ICD-10-CM

## 2019-06-13 DIAGNOSIS — Z80.0 FAMILY HISTORY OF COLON CANCER IN FATHER: ICD-10-CM

## 2019-06-13 DIAGNOSIS — R13.10 DYSPHAGIA, UNSPECIFIED TYPE: Chronic | ICD-10-CM

## 2019-06-13 DIAGNOSIS — K59.00 CONSTIPATION, UNSPECIFIED CONSTIPATION TYPE: Chronic | ICD-10-CM

## 2019-06-13 DIAGNOSIS — Z12.11 COLON CANCER SCREENING: ICD-10-CM

## 2019-06-13 PROCEDURE — 99204 OFFICE O/P NEW MOD 45 MIN: CPT | Performed by: NURSE PRACTITIONER

## 2019-06-13 NOTE — PROGRESS NOTES
Chief Complaint   Patient presents with   • Colon Cancer Screening   • Abdominal Pain   • Nausea   • Heartburn   • Constipation   • Diarrhea     The patient has a history of upper abdominal pain that started in May 2019. The pain radiates to the rest of her abdomen. The pain has gradually improved since she has been watching her diet. The pain occurs daily. It is moderate to severe and is described as aching, cramping and a sensation of fullness. Eating and bowel movements makes the pain worse. The patient has been taking Pantoprazole with no improvement of the pain.     There is a long standing history of nausea that started over a year ago. The nausea worsened since May 2019. The patient has the nausea daily. Eating makes nausea worse. The patient takes Zofran and occasionally phenergan with improvement of nausea. There is no history of vomiting.     There is a long standing history of heartburn. The patient is taking Pantoprazole with mild improvement. The patient has heartburn several days per week. The patient has been having difficulty swallowing for off and on for several months. The patient has difficulty swallowing solids, no problems swallowing liquids.    There is a long standing history of constipation and diarrhea. The patient may not have a bowel movement for 5-6 days, then when she has a bowel movement, she has diarrhea. She may have 5-6 bowel movements per day when she is having diarrhea. She is not taking anything for diarrhea or constipation. There is no history of bright red blood per rectum or melena.     The patient's last colonoscopy was in 2015. There is a family history of colon cancer in the patient's father. He passed away at age 53.     Abdominal Pain   This is a chronic problem. The current episode started more than 1 month ago (May 2019). The onset quality is gradual. The problem occurs daily. The most recent episode lasted 2 weeks. The problem has been unchanged. The pain is located in  the epigastric region. The pain is at a severity of 7/10. The quality of the pain is aching, cramping and a sensation of fullness. The abdominal pain radiates to the periumbilical region, left flank and right flank. Associated symptoms include anorexia, arthralgias, constipation, diarrhea, flatus, vomiting and weight loss. Pertinent negatives include no dysuria, fever, headaches, hematuria, myalgias or nausea. The pain is aggravated by bowel movement, certain positions and eating. The pain is relieved by bowel movements. She has tried proton pump inhibitors for the symptoms. The treatment provided no relief. Prior diagnostic workup includes CT scan. Her past medical history is significant for GERD.   Nausea   This is a chronic problem. The current episode started more than 1 year ago. The problem occurs intermittently. The problem has been gradually worsening. Associated symptoms include abdominal pain, anorexia, arthralgias, fatigue and vomiting. Pertinent negatives include no chest pain, chills, coughing, fever, headaches, joint swelling, myalgias, nausea or rash. The symptoms are aggravated by eating. Treatments tried: Zofran and Phenergan. The treatment provided significant relief.   Heartburn   She complains of abdominal pain and heartburn. She reports no chest pain, no coughing or no nausea. This is a chronic problem. Episode onset: over 5 years. The problem occurs occasionally. The problem has been unchanged. The heartburn duration is an hour. The heartburn is located in the substernum. The heartburn is of moderate intensity. The heartburn wakes her from sleep. Nothing aggravates the symptoms. Associated symptoms include fatigue and weight loss. There are no known risk factors. She has tried a PPI for the symptoms. The treatment provided moderate relief.   Constipation   This is a chronic problem. Episode onset: over 20 years. The problem is unchanged. Her stool frequency is 2 to 3 times per week. The stool  is described as loose and watery. Associated symptoms include abdominal pain, anorexia, back pain, diarrhea, flatus, vomiting and weight loss. Pertinent negatives include no fever or nausea. She has tried nothing for the symptoms.   Diarrhea    This is a chronic problem. Episode onset: over 20 years. Episode frequency: 5-6 episodes per day. The problem has been unchanged. The stool consistency is described as watery. The patient states that diarrhea does not awaken her from sleep. Associated symptoms include abdominal pain, arthralgias, increased flatus, vomiting and weight loss. Pertinent negatives include no chills, coughing, fever, headaches or myalgias. Nothing aggravates the symptoms. There are no known risk factors. She has tried nothing for the symptoms.   Difficulty Swallowing   This is a chronic problem. The current episode started more than 1 month ago. The problem occurs intermittently. The problem has been unchanged. Associated symptoms include abdominal pain, anorexia, arthralgias, fatigue and vomiting. Pertinent negatives include no chest pain, chills, coughing, fever, headaches, joint swelling, myalgias, nausea or rash. The symptoms are aggravated by eating. She has tried nothing for the symptoms.     Review of Systems   Constitutional: Positive for appetite change, fatigue and weight loss. Negative for chills, fever and unexpected weight change.   HENT: Negative for mouth sores, nosebleeds and trouble swallowing.    Eyes: Negative for discharge and redness.   Respiratory: Positive for apnea and shortness of breath. Negative for cough.    Cardiovascular: Positive for leg swelling. Negative for chest pain and palpitations.   Gastrointestinal: Positive for abdominal pain, anorexia, constipation, diarrhea, flatus, heartburn and vomiting. Negative for abdominal distention, anal bleeding, blood in stool and nausea.   Endocrine: Negative for cold intolerance, heat intolerance and polydipsia.    Genitourinary: Negative for dysuria, hematuria and urgency.   Musculoskeletal: Positive for arthralgias and back pain. Negative for joint swelling and myalgias.   Skin: Negative for rash.   Allergic/Immunologic: Negative for food allergies and immunocompromised state.   Neurological: Positive for dizziness. Negative for seizures, syncope and headaches.   Hematological: Negative for adenopathy. Does not bruise/bleed easily.   Psychiatric/Behavioral: Negative for dysphoric mood. The patient is not nervous/anxious and is not hyperactive.      Patient Active Problem List   Diagnosis   • Type 2 diabetes mellitus with hyperglycemia, with long-term current use of insulin (CMS/[facility])   • Hypothyroidism   • Gastroesophageal reflux disease without esophagitis   • Hypertension associated with diabetes (CMS/[facility])   • Meralgia paresthetica of right side   • Idiopathic peripheral neuropathy   • Worsening headaches   • Melena   • Rib pain on left side   • Fluid level behind tympanic membrane of both ears   • Tobacco abuse   • Morbid obesity (CMS/[facility])   • Ileitis   • Right lower quadrant abdominal pain   • Nausea   • Heartburn   • Dysphagia   • Generalized abdominal pain   • Constipation   • Diarrhea     Past Medical History:   Diagnosis Date   • Acid reflux    • Arthritis    • Asthma    • Chronic bronchitis (CMS/[facility])    • Colon polyp 2015   • Diabetes (CMS/[facility])    • Diverticulitis    • Diverticulosis    • Fracture     lateral left ankle   • Gallstones    • Gout    • Headache    • Hypertension    • Low back pain    • Neuromuscular disorder (CMS/[facility])    • Thyroid disease    • Visual impairment      Past Surgical History:   Procedure Laterality Date   •  SECTION     • CHOLECYSTECTOMY     • COLONOSCOPY  ,    DR LYLES,DR MARISCAL   • DILATATION AND CURETTAGE     • ENDOSCOPY      DR LOIDA ROBERTS   • FOOT SURGERY     • HYSTERECTOMY     • UPPER GASTROINTESTINAL ENDOSCOPY  2015     Family  History   Problem Relation Age of Onset   • Arthritis Mother    • Mental illness Mother    • Migraines Mother    • Osteoporosis Mother    • Thyroid disease Mother    • Depression Mother    • Heart disease Mother    • Colon cancer Father         passed away at age 53   • Cancer Father    • Thyroid disease Sister      Social History     Tobacco Use   • Smoking status: Current Every Day Smoker     Packs/day: 1.00     Years: 20.00     Pack years: 20.00     Types: Cigarettes   • Smokeless tobacco: Never Used   Substance Use Topics   • Alcohol use: No     Frequency: Never       Current Outpatient Medications:   •  albuterol (PROVENTIL HFA;VENTOLIN HFA) 108 (90 Base) MCG/ACT inhaler, Inhale 2 puffs Every 6 (Six) Hours As Needed for Wheezing or Shortness of Air., Disp: 1 inhaler, Rfl: 5  •  clotrimazole (LOTRIMIN) 1 % cream, Apply  topically to the appropriate area as directed 2 (Two) Times a Day., Disp: 45 g, Rfl: 0  •  fluconazole (DIFLUCAN) 150 MG tablet, TAKE ONE TAB PO X ONCE, CAN REPEAT IN 4 DAYS IF NEEDED, Disp: 2 tablet, Rfl: 3  •  gabapentin (NEURONTIN) 300 MG capsule, Take 1 capsule by mouth 3 (Three) Times a Day As Needed (numbness/burning pain)., Disp: 90 capsule, Rfl: 0  •  hydrocortisone 2.5 % cream, Apply  topically 2 (Two) Times a Day., Disp: 30 g, Rfl: 0  •  metFORMIN ER (GLUCOPHAGE XR) 500 MG 24 hr tablet, Take 2 tablets by mouth Daily With Breakfast., Disp: 180 tablet, Rfl: 4  •  mometasone (NASONEX) 50 MCG/ACT nasal spray, 2 sprays into the nostril(s) as directed by provider Daily., Disp: 1 each, Rfl: 1  •  ondansetron ODT (ZOFRAN ODT) 8 MG disintegrating tablet, Take 1 tablet by mouth Every 8 (Eight) Hours As Needed for Nausea or Vomiting., Disp: 15 tablet, Rfl: 0  •  pantoprazole (PROTONIX) 40 MG EC tablet, Take 1 tablet by mouth Daily., Disp: 90 tablet, Rfl: 4  •  promethazine (PHENERGAN) 25 MG tablet, Take 1 tablet by mouth Every 6 (Six) Hours As Needed for Nausea or Vomiting., Disp: 30 tablet, Rfl:  "0  •  traMADol (ULTRAM) 50 MG tablet, Take 1 tablet by mouth Every 6 (Six) Hours As Needed for Moderate Pain  or Severe Pain ., Disp: 15 tablet, Rfl: 0  •  glucose blood test strip, TEST DAILY E11.9, Disp: 100 each, Rfl: 12  •  glucose monitor monitoring kit, 1 each As Needed (diabetes). Check sugars fasting and 2 hours after meals., Disp: 1 each, Rfl: 0  •  Insulin Pen Needle (BD PEN NEEDLE LAURITA U/F) 32G X 4 MM misc, 1 Units 4 (Four) Times a Day., Disp: 100 each, Rfl: 12  •  ONE TOUCH LANCETS misc, 1 each 3 (Three) Times a Day. E11.65, Disp: 200 each, Rfl: 5    No Known Allergies    /70   Pulse 73   Temp 98.2 °F (36.8 °C)   Resp 16   Ht 162.6 cm (64\")   Wt 109 kg (241 lb)   BMI 41.37 kg/m²     Physical Exam   Constitutional: She is oriented to person, place, and time. She appears well-developed and well-nourished. No distress.   HENT:   Head: Normocephalic and atraumatic.   Right Ear: Hearing and external ear normal.   Left Ear: Hearing and external ear normal.   Nose: Nose normal.   Mouth/Throat: Oropharynx is clear and moist and mucous membranes are normal. Mucous membranes are not pale, not dry and not cyanotic. No oral lesions. No oropharyngeal exudate.   Eyes: Conjunctivae and EOM are normal. Right eye exhibits no discharge. Left eye exhibits no discharge.   Neck: Trachea normal. Neck supple. No JVD present. No edema present. No thyroid mass and no thyromegaly present.   Cardiovascular: Normal rate, regular rhythm, S2 normal and normal heart sounds. Exam reveals no gallop, no S3 and no friction rub.   No murmur heard.  Pulmonary/Chest: Effort normal and breath sounds normal. No respiratory distress. She exhibits no tenderness.   Abdominal: Normal appearance and bowel sounds are normal. She exhibits no distension, no ascites and no mass. There is no splenomegaly or hepatomegaly. There is generalized tenderness (moderate) and tenderness in the right lower quadrant. There is no rigidity, no rebound " and no guarding. No hernia.     Vascular Status -  Her right foot exhibits no edema. Her left foot exhibits no edema.  Lymphadenopathy:     She has no cervical adenopathy.        Left: No supraclavicular adenopathy present.   Neurological: She is alert and oriented to person, place, and time. She has normal strength. No cranial nerve deficit or sensory deficit.   Skin: No rash noted. She is not diaphoretic. No cyanosis. No pallor. Nails show no clubbing.   Psychiatric: She has a normal mood and affect.   Nursing note and vitals reviewed.  Stigmata of chronic liver disease:  None.  Asterixis:  None.    Laboratory Tests:   Upon review of records:     Dated 5/2/2019 glucose 240 BUN 9 creatinine 0.7 sodium 138 potassium 3.9 chloride 99 CO2 29 calcium 9.3 albumin 4.2 ALT 34 AST 31 alkaline phosphatase 115 total bilirubin 0.5 WBC 10.79 hemoglobin 15.6 hematocrit 47.0 platelet count 265 MCV 85.1 lipase 448 amylase 103 ionized calcium 5.0    Dated 5/3/2019 glucose 266 BUN 9 creatinine 0.6 sodium 138 potassium 4.1 chloride 102 CO2 29 calcium 9.3 albumin 4.0 ALT 25 AST 29 alkaline phosphatase 114 total bilirubin 0.4 WBC 11.38 hemoglobin 15.6 hematocrit 46.3 platelet count 234 MCV 83.4 lipase 284 sed rate 26 lactate 1.5 magnesium 2.1 troponin <0.012    Abdominal Imaging:  Upon review of records:    CT of abdomen pelvis without contrast dated 5/2/2019 reveals lung bases are clear. The heart size is normal. The limited noncontrast images of the liver is diffusely hypodense consistent with fatty infiltration. The patient is status post cholecystectomy. The  spleen is normal. No adrenal masses are seen.  The pancreas has an unremarkable unenhanced appearance. The aorta is normal in caliber. There is no significant free fluid or adenopathy.  There is no nephrolithiasis. There is no hydronephrosis. Limited noncontrast images of the bowel are unremarkable. The appendix is normal. The urinary bladder is unremarkable. The patient is  status post hysterectomy. There is no significant fluid or adenopathy.    CT of abdomen and pelvis with contrast dated 5/3/2019 reveals bilateral lower lobe atelectasis. The liver is low in attenuation consistent with fatty infiltration. The spleen, pancreas, adrenal glands and kidneys are unremarkable. The bowel gas pattern is nonobstructive. There is mild wall thickening of the terminal ileum. There is no evidence for appendicitis.  The bladder is normal in contour. There is no free fluid, free air or adenopathy.    Procedures:  Upon review of records:    EGD per Dr. Jesus Melendez dated 8/25/2015 reveals moderate hiatal hernia.  Reflux esophagitis.  Circumferential erosions/ulcerations.  Antrum with moderate diffuse inflammation, erythema, edema and granular seen.  No mucosal bleeding.  Duodenum with no abnormality seen.    Colonoscopy per Dr. Jesus Melendez dated 8/25/2015 reveals in the distal sigmoid colon 2 sessile polyps ranging in size from 5 mm to 6 mm were seen.  The polyps were not bleeding.  The polyps were benign in appearance.  The polyps were completely excised by hot forcep.  The polyps were partially retrieved. Pathology not available.    Assessment:      ICD-10-CM ICD-9-CM   1. Right lower quadrant abdominal pain R10.31 789.03   2. Nausea R11.0 787.02   3. Heartburn R12 787.1   4. Dysphagia, unspecified type R13.10 787.20   5. Generalized abdominal pain R10.84 789.07   6. Constipation, unspecified constipation type K59.00 564.00   7. Diarrhea, unspecified type R19.7 787.91   8. Ileitis K52.9 558.9   9. Colon cancer screening Z12.11 V76.51   10. Family history of colon cancer in father Z80.0 V16.0     Plan/  Patient Instructions   1. Antireflux measures: Avoid fried, fatty foods, alcohol, chocolate, coffee, tea,  soft drinks, peppermint and spearmint, spicy foods, tomatoes and tomato based foods, onion based foods, and smoking. Other antireflux measures include weight reduction if overweight, avoiding  tight clothing around the abdomen, elevating the head of the bed 6 inches with blocks under the head board, and don't drink or eat before going to bed and avoid lying down immediately after meals.  2. Pantoprazole 40 mg 1 tablet by mouth in the am 30 minutes before breakfast.  3. Zofran 4 mg 1 op every 8 hours as needed for nausea.   4. Dietary instructions.  The patient should eat relatively soft diet, and should eat in upright position and chew well.  The patient should drink water after 2-3 bites and take medications with liberal amounts of water.  Furthermore after eating and taking medications, the patient should remain in upright position for 5-10 minutes.  5. High fiber, low fat diet with liberal water intake. Discussed in detail.   6. Upper endoscopy-EGD: Description of the procedure, risks, benefits, alternatives and options, including nonoperative options, were discussed with the patient in detail. The patient understands and wishes to proceed.  7. Colonoscopy: Description of the procedure, risks, benefits, alternatives and options, including nonoperative options, were discussed with the patient in detail. The patient understands and wishes to proceed.     DAFNE Deluna

## 2019-06-13 NOTE — PATIENT INSTRUCTIONS
1. Antireflux measures: Avoid fried, fatty foods, alcohol, chocolate, coffee, tea,  soft drinks, peppermint and spearmint, spicy foods, tomatoes and tomato based foods, onion based foods, and smoking. Other antireflux measures include weight reduction if overweight, avoiding tight clothing around the abdomen, elevating the head of the bed 6 inches with blocks under the head board, and don't drink or eat before going to bed and avoid lying down immediately after meals.  2. Pantoprazole 40 mg 1 tablet by mouth in the am 30 minutes before breakfast.  3. Zofran 4 mg 1 op every 8 hours as needed for nausea.   4. Dietary instructions.  The patient should eat relatively soft diet, and should eat in upright position and chew well.  The patient should drink water after 2-3 bites and take medications with liberal amounts of water.  Furthermore after eating and taking medications, the patient should remain in upright position for 5-10 minutes.  5. High fiber, low fat diet with liberal water intake. Discussed in detail.   6. Upper endoscopy-EGD: Description of the procedure, risks, benefits, alternatives and options, including nonoperative options, were discussed with the patient in detail. The patient understands and wishes to proceed.  7. Colonoscopy: Description of the procedure, risks, benefits, alternatives and options, including nonoperative options, were discussed with the patient in detail. The patient understands and wishes to proceed.

## 2019-06-18 ENCOUNTER — PREP FOR SURGERY (OUTPATIENT)
Dept: OTHER | Facility: HOSPITAL | Age: 49
End: 2019-06-18

## 2019-06-18 DIAGNOSIS — R11.0 NAUSEA: Primary | ICD-10-CM

## 2019-06-18 DIAGNOSIS — R13.10 DYSPHAGIA, UNSPECIFIED TYPE: ICD-10-CM

## 2019-06-18 DIAGNOSIS — R10.10 UPPER ABDOMINAL PAIN: ICD-10-CM

## 2019-06-18 RX ORDER — SODIUM CHLORIDE 9 MG/ML
70 INJECTION, SOLUTION INTRAVENOUS CONTINUOUS PRN
Status: CANCELLED | OUTPATIENT
Start: 2019-06-18

## 2019-07-03 ENCOUNTER — PREP FOR SURGERY (OUTPATIENT)
Dept: OTHER | Facility: HOSPITAL | Age: 49
End: 2019-07-03

## 2019-07-03 DIAGNOSIS — Z12.11 COLON CANCER SCREENING: ICD-10-CM

## 2019-07-03 DIAGNOSIS — K52.9 ILEITIS: ICD-10-CM

## 2019-07-03 DIAGNOSIS — R19.7 DIARRHEA, UNSPECIFIED TYPE: ICD-10-CM

## 2019-07-03 DIAGNOSIS — R11.0 NAUSEA: ICD-10-CM

## 2019-07-03 DIAGNOSIS — R10.10 UPPER ABDOMINAL PAIN: ICD-10-CM

## 2019-07-03 DIAGNOSIS — R13.10 DYSPHAGIA, UNSPECIFIED TYPE: ICD-10-CM

## 2019-07-03 DIAGNOSIS — K59.00 CONSTIPATION, UNSPECIFIED CONSTIPATION TYPE: ICD-10-CM

## 2019-07-03 DIAGNOSIS — Z80.0 FAMILY HISTORY OF COLON CANCER IN FATHER: ICD-10-CM

## 2019-07-03 DIAGNOSIS — R12 HEARTBURN: Primary | ICD-10-CM

## 2019-07-03 DIAGNOSIS — R10.31 RIGHT LOWER QUADRANT ABDOMINAL PAIN: Primary | ICD-10-CM

## 2019-07-03 RX ORDER — SODIUM CHLORIDE 9 MG/ML
70 INJECTION, SOLUTION INTRAVENOUS CONTINUOUS PRN
Status: CANCELLED | OUTPATIENT
Start: 2019-07-16

## 2019-07-03 RX ORDER — SODIUM CHLORIDE 9 MG/ML
70 INJECTION, SOLUTION INTRAVENOUS CONTINUOUS PRN
Status: CANCELLED | OUTPATIENT
Start: 2019-08-23

## 2019-08-01 DIAGNOSIS — B37.31 YEAST INFECTION INVOLVING THE VAGINA AND SURROUNDING AREA: ICD-10-CM

## 2019-08-02 PROBLEM — R10.10 UPPER ABDOMINAL PAIN: Status: ACTIVE | Noted: 2019-08-02

## 2019-08-02 RX ORDER — FLUCONAZOLE 150 MG/1
TABLET ORAL
Qty: 2 TABLET | Refills: 2 | Status: SHIPPED | OUTPATIENT
Start: 2019-08-02 | End: 2019-09-27 | Stop reason: SDUPTHER

## 2019-09-27 RX ORDER — FLUCONAZOLE 150 MG/1
TABLET ORAL
Qty: 2 TABLET | Refills: 2 | Status: SHIPPED | OUTPATIENT
Start: 2019-09-27 | End: 2019-10-01 | Stop reason: SDUPTHER

## 2019-10-01 ENCOUNTER — OFFICE VISIT (OUTPATIENT)
Dept: INTERNAL MEDICINE | Facility: CLINIC | Age: 49
End: 2019-10-01

## 2019-10-01 VITALS
OXYGEN SATURATION: 96 % | HEIGHT: 64 IN | SYSTOLIC BLOOD PRESSURE: 135 MMHG | TEMPERATURE: 98.9 F | BODY MASS INDEX: 39.47 KG/M2 | WEIGHT: 231.2 LBS | HEART RATE: 76 BPM | DIASTOLIC BLOOD PRESSURE: 86 MMHG

## 2019-10-01 DIAGNOSIS — Z79.4 TYPE 2 DIABETES MELLITUS WITH HYPERGLYCEMIA, WITH LONG-TERM CURRENT USE OF INSULIN (HCC): Primary | ICD-10-CM

## 2019-10-01 DIAGNOSIS — L40.9 PSORIASIS: ICD-10-CM

## 2019-10-01 DIAGNOSIS — E11.65 TYPE 2 DIABETES MELLITUS WITH HYPERGLYCEMIA, WITH LONG-TERM CURRENT USE OF INSULIN (HCC): Primary | ICD-10-CM

## 2019-10-01 DIAGNOSIS — R16.0 ENLARGED LIVER: Primary | ICD-10-CM

## 2019-10-01 DIAGNOSIS — Z23 NEED FOR IMMUNIZATION AGAINST INFLUENZA: ICD-10-CM

## 2019-10-01 DIAGNOSIS — B37.31 YEAST INFECTION INVOLVING THE VAGINA AND SURROUNDING AREA: ICD-10-CM

## 2019-10-01 DIAGNOSIS — L65.9 HAIR LOSS: ICD-10-CM

## 2019-10-01 DIAGNOSIS — E11.59 HYPERTENSION ASSOCIATED WITH DIABETES (HCC): ICD-10-CM

## 2019-10-01 DIAGNOSIS — I15.2 HYPERTENSION ASSOCIATED WITH DIABETES (HCC): ICD-10-CM

## 2019-10-01 DIAGNOSIS — E66.01 CLASS 2 SEVERE OBESITY DUE TO EXCESS CALORIES WITH SERIOUS COMORBIDITY AND BODY MASS INDEX (BMI) OF 39.0 TO 39.9 IN ADULT (HCC): ICD-10-CM

## 2019-10-01 LAB — HBA1C MFR BLD: 13.1 %

## 2019-10-01 PROCEDURE — 83036 HEMOGLOBIN GLYCOSYLATED A1C: CPT | Performed by: FAMILY MEDICINE

## 2019-10-01 PROCEDURE — 90674 CCIIV4 VAC NO PRSV 0.5 ML IM: CPT | Performed by: FAMILY MEDICINE

## 2019-10-01 PROCEDURE — 90471 IMMUNIZATION ADMIN: CPT | Performed by: FAMILY MEDICINE

## 2019-10-01 PROCEDURE — 99214 OFFICE O/P EST MOD 30 MIN: CPT | Performed by: FAMILY MEDICINE

## 2019-10-01 RX ORDER — FLUOCINONIDE 0.5 MG/G
OINTMENT TOPICAL 2 TIMES DAILY
Qty: 120 G | Refills: 1 | Status: SHIPPED | OUTPATIENT
Start: 2019-10-01 | End: 2020-12-18 | Stop reason: SDUPTHER

## 2019-10-01 RX ORDER — FLUCONAZOLE 150 MG/1
TABLET ORAL
Qty: 2 TABLET | Refills: 2 | Status: SHIPPED | OUTPATIENT
Start: 2019-10-01 | End: 2019-10-17 | Stop reason: SDUPTHER

## 2019-10-02 ENCOUNTER — APPOINTMENT (OUTPATIENT)
Dept: LAB | Facility: HOSPITAL | Age: 49
End: 2019-10-02

## 2019-10-02 LAB
BASOPHILS # BLD AUTO: 0.06 10*3/MM3 (ref 0–0.2)
BASOPHILS NFR BLD AUTO: 0.5 % (ref 0–1.5)
DEPRECATED RDW RBC AUTO: 43.2 FL (ref 37–54)
EOSINOPHIL # BLD AUTO: 0.11 10*3/MM3 (ref 0–0.4)
EOSINOPHIL NFR BLD AUTO: 1 % (ref 0.3–6.2)
ERYTHROCYTE [DISTWIDTH] IN BLOOD BY AUTOMATED COUNT: 13.9 % (ref 12.3–15.4)
HCT VFR BLD AUTO: 49.6 % (ref 34–46.6)
HGB BLD-MCNC: 16 G/DL (ref 12–15.9)
IMM GRANULOCYTES # BLD AUTO: 0.08 10*3/MM3 (ref 0–0.05)
IMM GRANULOCYTES NFR BLD AUTO: 0.7 % (ref 0–0.5)
LYMPHOCYTES # BLD AUTO: 3.55 10*3/MM3 (ref 0.7–3.1)
LYMPHOCYTES NFR BLD AUTO: 30.8 % (ref 19.6–45.3)
MCH RBC QN AUTO: 27.6 PG (ref 26.6–33)
MCHC RBC AUTO-ENTMCNC: 32.3 G/DL (ref 31.5–35.7)
MCV RBC AUTO: 85.7 FL (ref 79–97)
MONOCYTES # BLD AUTO: 0.79 10*3/MM3 (ref 0.1–0.9)
MONOCYTES NFR BLD AUTO: 6.8 % (ref 5–12)
NEUTROPHILS # BLD AUTO: 6.95 10*3/MM3 (ref 1.7–7)
NEUTROPHILS NFR BLD AUTO: 60.2 % (ref 42.7–76)
NRBC BLD AUTO-RTO: 0.1 /100 WBC (ref 0–0.2)
PLATELET # BLD AUTO: 234 10*3/MM3 (ref 140–450)
PMV BLD AUTO: 11.2 FL (ref 6–12)
RBC # BLD AUTO: 5.79 10*6/MM3 (ref 3.77–5.28)
WBC NRBC COR # BLD: 11.54 10*3/MM3 (ref 3.4–10.8)

## 2019-10-02 PROCEDURE — 84439 ASSAY OF FREE THYROXINE: CPT | Performed by: FAMILY MEDICINE

## 2019-10-02 PROCEDURE — 80061 LIPID PANEL: CPT | Performed by: FAMILY MEDICINE

## 2019-10-02 PROCEDURE — 85025 COMPLETE CBC W/AUTO DIFF WBC: CPT | Performed by: FAMILY MEDICINE

## 2019-10-02 PROCEDURE — 82728 ASSAY OF FERRITIN: CPT | Performed by: FAMILY MEDICINE

## 2019-10-02 PROCEDURE — 83540 ASSAY OF IRON: CPT | Performed by: FAMILY MEDICINE

## 2019-10-02 PROCEDURE — 80053 COMPREHEN METABOLIC PANEL: CPT | Performed by: FAMILY MEDICINE

## 2019-10-02 PROCEDURE — 84466 ASSAY OF TRANSFERRIN: CPT | Performed by: FAMILY MEDICINE

## 2019-10-02 PROCEDURE — 36415 COLL VENOUS BLD VENIPUNCTURE: CPT | Performed by: FAMILY MEDICINE

## 2019-10-02 PROCEDURE — 84443 ASSAY THYROID STIM HORMONE: CPT | Performed by: FAMILY MEDICINE

## 2019-10-03 LAB
ALBUMIN SERPL-MCNC: 4.1 G/DL (ref 3.5–5.2)
ALBUMIN/GLOB SERPL: 1.1 G/DL
ALP SERPL-CCNC: 114 U/L (ref 39–117)
ALT SERPL W P-5'-P-CCNC: 27 U/L (ref 1–33)
ANION GAP SERPL CALCULATED.3IONS-SCNC: 12.4 MMOL/L (ref 5–15)
AST SERPL-CCNC: 24 U/L (ref 1–32)
BILIRUB SERPL-MCNC: 0.6 MG/DL (ref 0.2–1.2)
BUN BLD-MCNC: 14 MG/DL (ref 6–20)
BUN/CREAT SERPL: 19.2 (ref 7–25)
CALCIUM SPEC-SCNC: 9.6 MG/DL (ref 8.6–10.5)
CHLORIDE SERPL-SCNC: 92 MMOL/L (ref 98–107)
CHOLEST SERPL-MCNC: 163 MG/DL (ref 0–200)
CO2 SERPL-SCNC: 28.6 MMOL/L (ref 22–29)
CREAT BLD-MCNC: 0.73 MG/DL (ref 0.57–1)
FERRITIN SERPL-MCNC: 219 NG/ML (ref 13–150)
GFR SERPL CREATININE-BSD FRML MDRD: 84 ML/MIN/1.73
GLOBULIN UR ELPH-MCNC: 3.7 GM/DL
GLUCOSE BLD-MCNC: 286 MG/DL (ref 65–99)
HDLC SERPL-MCNC: 28 MG/DL (ref 40–60)
IRON 24H UR-MRATE: 61 MCG/DL (ref 37–145)
IRON SATN MFR SERPL: 17 % (ref 20–50)
LDLC SERPL CALC-MCNC: 73 MG/DL (ref 0–100)
LDLC/HDLC SERPL: 2.62 {RATIO}
POTASSIUM BLD-SCNC: 4.2 MMOL/L (ref 3.5–5.2)
PROT SERPL-MCNC: 7.8 G/DL (ref 6–8.5)
SODIUM BLD-SCNC: 133 MMOL/L (ref 136–145)
T4 FREE SERPL-MCNC: 0.95 NG/DL (ref 0.93–1.7)
TIBC SERPL-MCNC: 350 MCG/DL (ref 298–536)
TRANSFERRIN SERPL-MCNC: 235 MG/DL (ref 200–360)
TRIGL SERPL-MCNC: 308 MG/DL (ref 0–150)
TSH SERPL DL<=0.05 MIU/L-ACNC: 3.77 UIU/ML (ref 0.27–4.2)
VLDLC SERPL-MCNC: 61.6 MG/DL (ref 5–40)

## 2019-10-05 PROBLEM — K52.9 ILEITIS: Status: RESOLVED | Noted: 2019-06-13 | Resolved: 2019-10-05

## 2019-10-05 PROBLEM — R10.84 GENERALIZED ABDOMINAL PAIN: Status: RESOLVED | Noted: 2019-06-13 | Resolved: 2019-10-05

## 2019-10-05 PROBLEM — Z12.11 COLON CANCER SCREENING: Status: RESOLVED | Noted: 2019-07-03 | Resolved: 2019-10-05

## 2019-10-05 PROBLEM — R10.10 UPPER ABDOMINAL PAIN: Status: RESOLVED | Noted: 2019-08-02 | Resolved: 2019-10-05

## 2019-10-05 PROBLEM — R19.7 DIARRHEA: Status: RESOLVED | Noted: 2019-06-13 | Resolved: 2019-10-05

## 2019-10-05 PROBLEM — R10.31 RIGHT LOWER QUADRANT ABDOMINAL PAIN: Status: RESOLVED | Noted: 2019-06-13 | Resolved: 2019-10-05

## 2019-10-05 PROBLEM — K59.00 CONSTIPATION: Status: RESOLVED | Noted: 2019-06-13 | Resolved: 2019-10-05

## 2019-10-05 PROBLEM — E66.812 CLASS 2 SEVERE OBESITY DUE TO EXCESS CALORIES WITH SERIOUS COMORBIDITY IN ADULT: Status: ACTIVE | Noted: 2019-03-06

## 2019-10-05 NOTE — PROGRESS NOTES
Subjective    Karli Ricardo is a 49 y.o. female here for:  Chief Complaint   Patient presents with   • Diabetes       Vaginal yeast infections: recurrent issue, currently bothersome again with itching and white discharge. Associated with hyperglycemia from uncontrolled diabetes mellitus. Diflucan helps.     Hair loss: new issue, daily, worsening. Losing more hair than seems normal.     Skin lesion: recurrent right lower lateral leg. Has tried some topical steroids without improvement. Itches at times.      Diabetes   She presents for her follow-up diabetic visit. She has type 2 diabetes mellitus. Her disease course has been worsening. Associated symptoms include fatigue, foot paresthesias, polydipsia, polyuria and weight loss. Diabetic complications include peripheral neuropathy. Pertinent negatives for diabetic complications include no CVA or retinopathy. (Vaginal yeast infections, recurrent.) Risk factors for coronary artery disease include obesity, diabetes mellitus, sedentary lifestyle, post-menopausal, stress and hypertension. Current diabetic treatment includes oral agent (monotherapy). She is compliant with treatment some of the time. Her weight is decreasing steadily. She is following a generally unhealthy diet. Home blood sugar record trend: overall high. An ACE inhibitor/angiotensin II receptor blocker is not being taken. Eye exam is current.   Obesity   This is a chronic problem. The current episode started more than 1 year ago. The problem occurs daily. The problem has been waxing and waning. Associated symptoms include abdominal pain, arthralgias and fatigue. The symptoms are aggravated by stress and eating (diabetes). Treatments tried: metformin; soloquia. The treatment provided no relief.   Hypertension   This is a chronic problem. The current episode started more than 1 year ago. The problem has been waxing and waning since onset. There are no associated agents to hypertension. Risk factors  "for coronary artery disease include diabetes mellitus, obesity, sedentary lifestyle and stress. Past treatments include ACE inhibitors. Current antihypertension treatment includes nothing. Compliance problems include exercise and diet.  There is no history of CVA or retinopathy.          The following portions of the patient's history were reviewed and updated as appropriate: allergies, current medications, past family history, past medical history, past social history, past surgical history and problem list.    Review of Systems   Constitutional: Positive for fatigue and unexpected weight loss.   Gastrointestinal: Positive for abdominal pain.   Endocrine: Positive for polydipsia and polyuria.   Musculoskeletal: Positive for arthralgias.   Skin: Positive for dry skin and skin lesions.       Vitals:    10/01/19 1543   BP: 135/86   Pulse: 76   Temp: 98.9 °F (37.2 °C)   TempSrc: Temporal   SpO2: 96%   Weight: 105 kg (231 lb 3.2 oz)   Height: 162.6 cm (64\")     Patient's Body mass index is 39.69 kg/m². BMI is above normal parameters. Recommendations include: nutrition counseling.          Objective   Physical Exam   Constitutional: She is oriented to person, place, and time. Vital signs are normal. She appears well-developed and well-nourished. She is active.  Non-toxic appearance. She does not have a sickly appearance. She does not appear ill. No distress. She is obese.  HENT:   Head: Normocephalic and atraumatic. Hair is normal.   Right Ear: Hearing and external ear normal.   Left Ear: Hearing and external ear normal.   Nose: Nose normal.   Mouth/Throat: Mucous membranes are not dry. No trismus in the jaw.   Eyes: Conjunctivae, EOM and lids are normal. Pupils are equal, round, and reactive to light. No scleral icterus.   Neck: Phonation normal. Neck supple. No tracheal deviation present.   Cardiovascular: Normal rate, regular rhythm and normal heart sounds.   No murmur heard.  Pulmonary/Chest: Effort normal and breath " sounds normal.   Musculoskeletal: She exhibits no edema or deformity.   Neurological: She is alert and oriented to person, place, and time. She displays no tremor. No cranial nerve deficit. She exhibits normal muscle tone. Gait normal.   Skin: Skin is warm. Turgor is normal. No rash noted. She is not diaphoretic. No cyanosis. No pallor. Nails show no clubbing.        Psychiatric: She has a normal mood and affect. Her speech is normal and behavior is normal. Judgment and thought content normal. Cognition and memory are normal.   Nursing note and vitals reviewed.      Lab Results   Component Value Date    HGBA1C 13.1 10/01/2019    HGBA1C 9.7 05/01/2019    HGBA1C 11.8 01/07/2019       Assessment/Plan     Problem List Items Addressed This Visit        Cardiovascular and Mediastinum    Hypertension associated with diabetes (CMS/[facility])    Relevant Medications    Insulin Glargine (TOUJEO MAX SOLOSTAR) 300 UNIT/ML solution pen-injector injection       Digestive    Class 2 severe obesity due to excess calories with serious comorbidity in adult (CMS/[facility])       Endocrine    Type 2 diabetes mellitus with hyperglycemia, with long-term current use of insulin (CMS/[facility]) - Primary    Relevant Medications    Insulin Glargine (TOUJEO MAX SOLOSTAR) 300 UNIT/ML solution pen-injector injection    Insulin Pen Needle (BD PEN NEEDLE LAURITA U/F) 32G X 4 MM misc    Other Relevant Orders    POC Glycosylated Hemoglobin (Hb A1C) (Completed)    Comprehensive Metabolic Panel (Completed)    Lipid Panel (Completed)      Other Visit Diagnoses     Yeast infection involving the vagina and surrounding area        Relevant Medications    fluconazole (DIFLUCAN) 150 MG tablet    Need for immunization against influenza        Relevant Orders    Flucelvax Quad=>4Years ([id number]) (Completed)    Hair loss        Relevant Orders    Ferritin (Completed)    Iron Profile (Completed)    CBC & Differential (Completed)    TSH+Free T4 (Completed)    CBC Auto Differential  (Completed)    Psoriasis        Relevant Medications    fluocinonide (LIDEX) 0.05 % ointment        · Monitor weight, may see gain with insulin use especially as sugars normalize  · Needs to check sugars and keep log, discussed upward titration of insulin. Sample Tresiba given to hold her over until picks up the insurance approved insulin.  · Discussed yeast infections, will continue until sugars improved.  · Blood pressure borderline. May need to restart medicine next visit.       Return in about 3 months (around 1/6/2020) for Diabetes follow up or sooner if needed.    Lovely Loyd MD

## 2019-10-07 ENCOUNTER — TELEPHONE (OUTPATIENT)
Dept: INTERNAL MEDICINE | Facility: CLINIC | Age: 49
End: 2019-10-07

## 2019-10-07 NOTE — TELEPHONE ENCOUNTER
Go to 40 units. Continue checking fasting daily and if still running high after 3 days can go up further as we discussed during her visit. May also want to check sugar 2 hours after largest meal, as we may have to add mealtime insulin. Did the freestyle pop go through? (I sent that, right?)

## 2019-10-07 NOTE — TELEPHONE ENCOUNTER
Patient states that her new insulin is not working. She upped the dose to 30 and it would bring it down to 285 but it is going right back up into the 400's.       Can call back after 2:30 or [email or web address] Message

## 2019-10-09 ENCOUNTER — TELEPHONE (OUTPATIENT)
Dept: INTERNAL MEDICINE | Facility: CLINIC | Age: 49
End: 2019-10-09

## 2019-10-09 DIAGNOSIS — E11.65 TYPE 2 DIABETES MELLITUS WITH HYPERGLYCEMIA, WITHOUT LONG-TERM CURRENT USE OF INSULIN (HCC): ICD-10-CM

## 2019-10-09 RX ORDER — METFORMIN HYDROCHLORIDE 500 MG/1
1000 TABLET, EXTENDED RELEASE ORAL 2 TIMES DAILY
Qty: 360 TABLET | Refills: 3 | Status: SHIPPED | OUTPATIENT
Start: 2019-10-09 | End: 2020-11-19 | Stop reason: SDUPTHER

## 2019-10-09 NOTE — TELEPHONE ENCOUNTER
Patient called stating that when she woke up this morning her sugar was so high that she took 50 units of insulin and 2 hours later she checked her sugar again before eating and it was 277 fasting. Patient is unsure on what to do. Please advise

## 2019-10-11 ENCOUNTER — OFFICE VISIT (OUTPATIENT)
Dept: INTERNAL MEDICINE | Facility: CLINIC | Age: 49
End: 2019-10-11

## 2019-10-11 VITALS
OXYGEN SATURATION: 97 % | WEIGHT: 232 LBS | SYSTOLIC BLOOD PRESSURE: 153 MMHG | DIASTOLIC BLOOD PRESSURE: 84 MMHG | RESPIRATION RATE: 16 BRPM | HEIGHT: 64 IN | HEART RATE: 68 BPM | TEMPERATURE: 97.7 F | BODY MASS INDEX: 39.61 KG/M2

## 2019-10-11 DIAGNOSIS — E11.59 HYPERTENSION ASSOCIATED WITH DIABETES (HCC): ICD-10-CM

## 2019-10-11 DIAGNOSIS — J02.9 ACUTE PHARYNGITIS, UNSPECIFIED ETIOLOGY: Primary | ICD-10-CM

## 2019-10-11 DIAGNOSIS — H65.92 LEFT OTITIS MEDIA WITH EFFUSION: ICD-10-CM

## 2019-10-11 DIAGNOSIS — I15.2 HYPERTENSION ASSOCIATED WITH DIABETES (HCC): ICD-10-CM

## 2019-10-11 PROCEDURE — 99214 OFFICE O/P EST MOD 30 MIN: CPT | Performed by: NURSE PRACTITIONER

## 2019-10-11 PROCEDURE — 96372 THER/PROPH/DIAG INJ SC/IM: CPT | Performed by: NURSE PRACTITIONER

## 2019-10-11 RX ORDER — LISINOPRIL 5 MG/1
5 TABLET ORAL DAILY
Qty: 30 TABLET | Refills: 1 | Status: SHIPPED | OUTPATIENT
Start: 2019-10-11 | End: 2020-11-19 | Stop reason: SDUPTHER

## 2019-10-11 RX ORDER — CEFTRIAXONE 1 G/1
1 INJECTION, POWDER, FOR SOLUTION INTRAMUSCULAR; INTRAVENOUS ONCE
Status: COMPLETED | OUTPATIENT
Start: 2019-10-11 | End: 2019-10-11

## 2019-10-11 RX ADMIN — CEFTRIAXONE 1 G: 1 INJECTION, POWDER, FOR SOLUTION INTRAMUSCULAR; INTRAVENOUS at 12:41

## 2019-10-11 NOTE — PROGRESS NOTES
Chief Complaint / Reason:      Chief Complaint   Patient presents with   • Sore Throat   • Sinusitis       Subjective     HPI  Presents today with complaints of sore throat and sinus congestion she states symptoms have been going on for almost a week and have progressively worsened.  She states last few days it has gotten so bad which is why she decided to see someone.  She denies fever or chills.  She states that she does have a headache but has not been sleeping well due to her throat being sore and sinus congestion.  Does have some pressure in her ears.  Vital signs are stable with exception of elevated blood pressure.  She denies chest pain, shortness of breath or heart palpitations.  Has tried over-the-counter medications along with Tessalon Perles with minimal relief.  History taken from: patient    PMH/FH/Social History were reviewed and updated appropriately in the electronic medical record.   Past Medical History:   Diagnosis Date   • Acid reflux    • Arthritis    • Asthma    • Chronic bronchitis (CMS/[facility])    • Colon polyp 2015   • Diabetes (CMS/[facility])    • Diverticulitis    • Diverticulosis    • Fracture     lateral left ankle   • Gallstones    • Gout    • Headache    • Hypertension    • Ileitis 2019   • Low back pain    • Neuromuscular disorder (CMS/[facility])    • Thyroid disease    • Visual impairment      Past Surgical History:   Procedure Laterality Date   •  SECTION     • CHOLECYSTECTOMY     • COLONOSCOPY  ,    DR LYLES,DR MARISCAL   • DILATATION AND CURETTAGE     • ENDOSCOPY      DR LOIDA ROBERTS   • FOOT SURGERY     • HYSTERECTOMY     • UPPER GASTROINTESTINAL ENDOSCOPY  2015     Social History     Socioeconomic History   • Marital status:      Spouse name: Not on file   • Number of children: Not on file   • Years of education: Not on file   • Highest education level: Not on file   Tobacco Use   • Smoking status: Current Every Day Smoker      Packs/day: 1.00     Years: 20.00     Pack years: 20.00     Types: Cigarettes   • Smokeless tobacco: Never Used   Substance and Sexual Activity   • Alcohol use: No     Frequency: Never   • Drug use: No   • Sexual activity: Yes     Partners: Male     Birth control/protection: Surgical     Family History   Problem Relation Age of Onset   • Arthritis Mother    • Mental illness Mother    • Migraines Mother    • Osteoporosis Mother    • Thyroid disease Mother    • Depression Mother    • Heart disease Mother    • Colon cancer Father         passed away at age 53   • Cancer Father    • Thyroid disease Sister        Review of Systems:   Review of Systems   Constitutional: Positive for fatigue. Negative for chills and fever.   HENT: Positive for congestion, ear pain, postnasal drip, rhinorrhea, sinus pressure and sore throat.    Eyes: Negative for pain.   Respiratory: Positive for cough. Negative for shortness of breath.    Neurological: Positive for headache. Negative for dizziness.   Hematological: Negative for adenopathy.         All other systems were reviewed and are negative.  Exceptions are noted in the subjective or above.      Objective     Vital Signs  Vitals:    10/11/19 1136   BP: 153/84   Pulse: 68   Resp: 16   Temp: 97.7 °F (36.5 °C)   SpO2: 97%       Body mass index is 39.82 kg/m².    Physical Exam   Constitutional: She is oriented to person, place, and time. She appears well-developed and well-nourished. No distress.   HENT:   Head: Normocephalic and atraumatic.   Right Ear: Tympanic membrane, external ear and ear canal normal.   Left Ear: Tympanic membrane, external ear and ear canal normal.   Nose: Mucosal edema, rhinorrhea, sinus tenderness and congestion present. Right sinus exhibits maxillary sinus tenderness. Right sinus exhibits no frontal sinus tenderness. Left sinus exhibits maxillary sinus tenderness. Left sinus exhibits no frontal sinus tenderness.   Mouth/Throat: Oropharynx is clear and moist. No  oropharyngeal exudate.   Eyes: Conjunctivae are normal. Pupils are equal, round, and reactive to light. Right conjunctiva is not injected. Left conjunctiva is not injected.   Cardiovascular: Normal rate, regular rhythm and normal heart sounds.   Pulmonary/Chest: Effort normal and breath sounds normal. No respiratory distress.   Lymphadenopathy:        Head (right side): No submental, no submandibular and no tonsillar adenopathy present.        Head (left side): No submental, no submandibular and no tonsillar adenopathy present.     She has no cervical adenopathy.   Neurological: She is alert and oriented to person, place, and time.   Skin: Skin is warm and dry.   Nursing note and vitals reviewed.           Results Review:    I reviewed the patient's previous clinical results.       Medication Review:     Current Outpatient Medications:   •  albuterol (PROVENTIL HFA;VENTOLIN HFA) 108 (90 Base) MCG/ACT inhaler, Inhale 2 puffs Every 6 (Six) Hours As Needed for Wheezing or Shortness of Air., Disp: 1 inhaler, Rfl: 5  •  clotrimazole (LOTRIMIN) 1 % cream, Apply  topically to the appropriate area as directed 2 (Two) Times a Day., Disp: 45 g, Rfl: 0  •  fluconazole (DIFLUCAN) 150 MG tablet, TAKE 1 TABLET BY MOUTH NOW, then repeat in 4 days if needed, Disp: 2 tablet, Rfl: 2  •  fluocinonide (LIDEX) 0.05 % ointment, Apply  topically to the appropriate area as directed 2 (Two) Times a Day., Disp: 120 g, Rfl: 1  •  gabapentin (NEURONTIN) 300 MG capsule, Take 1 capsule by mouth 3 (Three) Times a Day As Needed (numbness/burning pain)., Disp: 90 capsule, Rfl: 0  •  hydrocortisone 2.5 % cream, Apply  topically 2 (Two) Times a Day., Disp: 30 g, Rfl: 0  •  Insulin Glargine (TOUJEO MAX SOLOSTAR) 300 UNIT/ML solution pen-injector injection, Inject 40 Units under the skin into the appropriate area as directed Every Night., Disp: 12 mL, Rfl: 3  •  Insulin Pen Needle (BD PEN NEEDLE LAURITA U/F) 32G X 4 MM misc, 1 Units 4 (Four) Times a Day.,  Disp: 100 each, Rfl: 12  •  metFORMIN ER (GLUCOPHAGE XR) 500 MG 24 hr tablet, Take 2 tablets by mouth 2 (Two) Times a Day., Disp: 360 tablet, Rfl: 3  •  mometasone (NASONEX) 50 MCG/ACT nasal spray, 2 sprays into the nostril(s) as directed by provider Daily., Disp: 1 each, Rfl: 1  •  ondansetron ODT (ZOFRAN ODT) 8 MG disintegrating tablet, Take 1 tablet by mouth Every 8 (Eight) Hours As Needed for Nausea or Vomiting., Disp: 15 tablet, Rfl: 0  •  ONE TOUCH ULTRA TEST test strip, USE TO CHECK BLOOD GLUCOSE ONCE DAILY OR AS DIRECTED, Disp: , Rfl: 12  •  pantoprazole (PROTONIX) 40 MG EC tablet, Take 1 tablet by mouth Daily., Disp: 90 tablet, Rfl: 4  •  promethazine (PHENERGAN) 25 MG tablet, Take 1 tablet by mouth Every 6 (Six) Hours As Needed for Nausea or Vomiting., Disp: 30 tablet, Rfl: 0    Assessment/Plan   [place] was seen today for sore throat and sinusitis.    Diagnoses and all orders for this visit:    Acute pharyngitis, unspecified etiology  -     cefTRIAXone (ROCEPHIN) injection 1 g    Left otitis media with effusion  -     cefTRIAXone (ROCEPHIN) injection 1 g    Hypertension associated with diabetes (CMS/HCC)  -     lisinopril (PRINIVIL,ZESTRIL) 5 MG tablet; Take 1 tablet by mouth Daily.    Initiate lifestyle modifications.   DASH Diet and exercise.   Compliance with medication regimen and discussed ways to prevent of long-term complications from high blood pressure.  Discussed when to seek medical attention.  Encouraged patient to take blood pressure daily and keep a log.        Return in about 4 weeks (around 11/8/2019), or if symptoms worsen or fail to improve.    Najma Ziegler, APRN  10/11/2019

## 2019-10-17 ENCOUNTER — OFFICE VISIT (OUTPATIENT)
Dept: INTERNAL MEDICINE | Facility: CLINIC | Age: 49
End: 2019-10-17

## 2019-10-17 VITALS
WEIGHT: 236 LBS | OXYGEN SATURATION: 97 % | BODY MASS INDEX: 40.29 KG/M2 | HEART RATE: 70 BPM | DIASTOLIC BLOOD PRESSURE: 64 MMHG | TEMPERATURE: 98.5 F | SYSTOLIC BLOOD PRESSURE: 128 MMHG | HEIGHT: 64 IN | RESPIRATION RATE: 16 BRPM

## 2019-10-17 DIAGNOSIS — J01.40 ACUTE NON-RECURRENT PANSINUSITIS: Primary | ICD-10-CM

## 2019-10-17 DIAGNOSIS — Z79.4 TYPE 2 DIABETES MELLITUS WITH HYPERGLYCEMIA, WITH LONG-TERM CURRENT USE OF INSULIN (HCC): ICD-10-CM

## 2019-10-17 DIAGNOSIS — E11.65 TYPE 2 DIABETES MELLITUS WITH HYPERGLYCEMIA, WITH LONG-TERM CURRENT USE OF INSULIN (HCC): ICD-10-CM

## 2019-10-17 DIAGNOSIS — B37.31 YEAST INFECTION INVOLVING THE VAGINA AND SURROUNDING AREA: ICD-10-CM

## 2019-10-17 PROCEDURE — 99214 OFFICE O/P EST MOD 30 MIN: CPT | Performed by: FAMILY MEDICINE

## 2019-10-17 RX ORDER — MOMETASONE FUROATE 50 UG/1
2 SPRAY, METERED NASAL DAILY
Qty: 1 EACH | Refills: 1 | Status: SHIPPED | OUTPATIENT
Start: 2019-10-17 | End: 2020-12-18 | Stop reason: ALTCHOICE

## 2019-10-17 RX ORDER — FLUCONAZOLE 150 MG/1
TABLET ORAL
Qty: 2 TABLET | Refills: 2 | Status: SHIPPED | OUTPATIENT
Start: 2019-10-17 | End: 2020-01-06 | Stop reason: SDUPTHER

## 2019-10-17 RX ORDER — CEFDINIR 300 MG/1
300 CAPSULE ORAL 2 TIMES DAILY
Qty: 20 CAPSULE | Refills: 0 | Status: SHIPPED | OUTPATIENT
Start: 2019-10-17 | End: 2019-10-27

## 2019-10-17 NOTE — PROGRESS NOTES
Karli Ricardo is a 49 y.o. female.    Chief Complaint   Patient presents with   • URI     patient states she is continuing to feel bad, increased headaches.       HPI   Patient reports they have not been feeling well for 2week(s). She admits to nasal congestion, facial pain, ear pain, sore throat, headache, cough, drainage.  She denies fever.  They have tried rocephin, neti pot, tessalon perles for this issue without good response.      Patient is diabetic and glucose readings have been elevated here lately.  She has been advised to increase insulin and metformin by PCP.  She has done so and glucose reading remain elevated  She is not currently on a rapid acting insulin, but is agreeable to trying one.  Since glucose readings have been elevated patient has also kept a yeast infection.  She is requesting a refill on diflucan as well as she is receiving antibiotics.     The following portions of the patient's history were reviewed and updated as appropriate: allergies, current medications, past family history, past medical history, past social history, past surgical history and problem list.     No Known Allergies      Current Outpatient Medications:   •  albuterol (PROVENTIL HFA;VENTOLIN HFA) 108 (90 Base) MCG/ACT inhaler, Inhale 2 puffs Every 6 (Six) Hours As Needed for Wheezing or Shortness of Air., Disp: 1 inhaler, Rfl: 5  •  clotrimazole (LOTRIMIN) 1 % cream, Apply  topically to the appropriate area as directed 2 (Two) Times a Day., Disp: 45 g, Rfl: 0  •  fluconazole (DIFLUCAN) 150 MG tablet, TAKE 1 TABLET BY MOUTH NOW, then repeat in 4 days if needed, Disp: 2 tablet, Rfl: 2  •  fluocinonide (LIDEX) 0.05 % ointment, Apply  topically to the appropriate area as directed 2 (Two) Times a Day., Disp: 120 g, Rfl: 1  •  gabapentin (NEURONTIN) 300 MG capsule, Take 1 capsule by mouth 3 (Three) Times a Day As Needed (numbness/burning pain)., Disp: 90 capsule, Rfl: 0  •  hydrocortisone 2.5 % cream, Apply  topically 2  (Two) Times a Day., Disp: 30 g, Rfl: 0  •  Insulin Glargine (TOUJEO MAX SOLOSTAR) 300 UNIT/ML solution pen-injector injection, Inject 40 Units under the skin into the appropriate area as directed Every Night., Disp: 12 mL, Rfl: 3  •  Insulin Pen Needle (BD PEN NEEDLE LAURITA U/F) 32G X 4 MM misc, 1 Units 4 (Four) Times a Day., Disp: 100 each, Rfl: 12  •  lisinopril (PRINIVIL,ZESTRIL) 5 MG tablet, Take 1 tablet by mouth Daily., Disp: 30 tablet, Rfl: 1  •  metFORMIN ER (GLUCOPHAGE XR) 500 MG 24 hr tablet, Take 2 tablets by mouth 2 (Two) Times a Day., Disp: 360 tablet, Rfl: 3  •  mometasone (NASONEX) 50 MCG/ACT nasal spray, 2 sprays into the nostril(s) as directed by provider Daily., Disp: 1 each, Rfl: 1  •  ondansetron ODT (ZOFRAN ODT) 8 MG disintegrating tablet, Take 1 tablet by mouth Every 8 (Eight) Hours As Needed for Nausea or Vomiting., Disp: 15 tablet, Rfl: 0  •  ONE TOUCH ULTRA TEST test strip, USE TO CHECK BLOOD GLUCOSE ONCE DAILY OR AS DIRECTED, Disp: , Rfl: 12  •  pantoprazole (PROTONIX) 40 MG EC tablet, Take 1 tablet by mouth Daily., Disp: 90 tablet, Rfl: 4  •  promethazine (PHENERGAN) 25 MG tablet, Take 1 tablet by mouth Every 6 (Six) Hours As Needed for Nausea or Vomiting., Disp: 30 tablet, Rfl: 0  •  cefdinir (OMNICEF) 300 MG capsule, Take 1 capsule by mouth 2 (Two) Times a Day for 10 days., Disp: 20 capsule, Rfl: 0  •  Insulin Lispro (HUMALOG KWIKPEN) 100 UNIT/ML solution pen-injector, Inject 7 Units under the skin into the appropriate area as directed 3 (Three) Times a Day Before Meals., Disp: 12 mL, Rfl: 2    ROS    Review of Systems   Constitutional: Negative for chills and fever.   HENT: Positive for congestion, ear pain, postnasal drip, sinus pressure and sore throat.    Respiratory: Positive for cough. Negative for shortness of breath.    Cardiovascular: Negative for chest pain.   Gastrointestinal: Negative for abdominal pain, diarrhea, nausea and vomiting.   Neurological: Positive for headache.  "      Vitals:    10/17/19 1358   BP: 128/64   BP Location: Left arm   Patient Position: Sitting   Cuff Size: Large Adult   Pulse: 70   Resp: 16   Temp: 98.5 °F (36.9 °C)   TempSrc: Oral   SpO2: 97%   Weight: 107 kg (236 lb)   Height: 162.6 cm (64\")     Body mass index is 40.51 kg/m².    Physical Exam     Physical Exam   Constitutional: She is oriented to person, place, and time. She appears well-developed and well-nourished. No distress.   HENT:   Head: Normocephalic and atraumatic.   Right Ear: Tympanic membrane and external ear normal.   Left Ear: Tympanic membrane and external ear normal.   Nose: Right sinus exhibits maxillary sinus tenderness and frontal sinus tenderness. Left sinus exhibits maxillary sinus tenderness and frontal sinus tenderness.   Mouth/Throat: Posterior oropharyngeal erythema present.   Eyes: Conjunctivae and EOM are normal.   Neck: Normal range of motion. Neck supple.   Cardiovascular: Normal rate and regular rhythm.   No murmur heard.  Pulmonary/Chest: Effort normal and breath sounds normal. No respiratory distress. She has no wheezes.   Abdominal: Soft. Bowel sounds are normal. She exhibits no distension. There is no tenderness.   Lymphadenopathy:     She has no cervical adenopathy.   Neurological: She is alert and oriented to person, place, and time. No cranial nerve deficit.   Skin: Skin is warm and dry.   Psychiatric: She has a normal mood and affect. Her behavior is normal.       Assessment/Plan    Problem List Items Addressed This Visit        Respiratory    Acute non-recurrent pansinusitis - Primary     Nasonex has been refilled.  Will also treat with omnicef.  Encouraged to continue neti pot and tessalon perles.             Endocrine    Type 2 diabetes mellitus with hyperglycemia, with long-term current use of insulin (CMS/[facility])     Continue with long acting insulin and take as 1 dose nightly.  Continue oral medications.  Will start on humalog TID with meals starting at 7 units.    "       Relevant Medications    Insulin Lispro (HUMALOG KWIKPEN) 100 UNIT/ML solution pen-injector      Other Visit Diagnoses     Yeast infection involving the vagina and surrounding area        Relevant Medications    fluconazole (DIFLUCAN) 150 MG tablet          New Medications Ordered This Visit   Medications   • Insulin Lispro (HUMALOG KWIKPEN) 100 UNIT/ML solution pen-injector     Sig: Inject 7 Units under the skin into the appropriate area as directed 3 (Three) Times a Day Before Meals.     Dispense:  12 mL     Refill:  2   • cefdinir (OMNICEF) 300 MG capsule     Sig: Take 1 capsule by mouth 2 (Two) Times a Day for 10 days.     Dispense:  20 capsule     Refill:  0   • fluconazole (DIFLUCAN) 150 MG tablet     Sig: TAKE 1 TABLET BY MOUTH NOW, then repeat in 4 days if needed     Dispense:  2 tablet     Refill:  2   • mometasone (NASONEX) 50 MCG/ACT nasal spray     Si sprays into the nostril(s) as directed by provider Daily.     Dispense:  1 each     Refill:  1       No orders of the defined types were placed in this encounter.      No Follow-up on file.    Cherise Jones DO

## 2019-10-22 PROBLEM — J01.40 ACUTE NON-RECURRENT PANSINUSITIS: Status: ACTIVE | Noted: 2019-10-22

## 2019-10-22 NOTE — ASSESSMENT & PLAN NOTE
Nasonex has been refilled.  Will also treat with omnicef.  Encouraged to continue neti pot and tessalon perles.

## 2019-10-22 NOTE — ASSESSMENT & PLAN NOTE
Continue with long acting insulin and take as 1 dose nightly.  Continue oral medications.  Will start on humalog TID with meals starting at 7 units.

## 2019-12-12 ENCOUNTER — APPOINTMENT (OUTPATIENT)
Dept: GENERAL RADIOLOGY | Facility: HOSPITAL | Age: 49
End: 2019-12-12

## 2019-12-12 ENCOUNTER — HOSPITAL ENCOUNTER (EMERGENCY)
Facility: HOSPITAL | Age: 49
Discharge: HOME | End: 2019-12-12
Attending: EMERGENCY MEDICINE | Admitting: EMERGENCY MEDICINE

## 2019-12-12 VITALS
RESPIRATION RATE: 16 BRPM | BODY MASS INDEX: 42.17 KG/M2 | WEIGHT: 238 LBS | HEART RATE: 70 BPM | HEIGHT: 63 IN | SYSTOLIC BLOOD PRESSURE: 122 MMHG | DIASTOLIC BLOOD PRESSURE: 61 MMHG | OXYGEN SATURATION: 97 % | TEMPERATURE: 98.5 F

## 2019-12-12 DIAGNOSIS — R11.0 NAUSEA: ICD-10-CM

## 2019-12-12 DIAGNOSIS — J45.20 MILD INTERMITTENT ASTHMA WITHOUT COMPLICATION: ICD-10-CM

## 2019-12-12 DIAGNOSIS — R73.9 HYPERGLYCEMIA: Primary | ICD-10-CM

## 2019-12-12 DIAGNOSIS — J06.9 UPPER RESPIRATORY TRACT INFECTION, UNSPECIFIED TYPE: ICD-10-CM

## 2019-12-12 LAB
ALBUMIN SERPL-MCNC: 3.9 G/DL (ref 3.5–5.2)
ALBUMIN/GLOB SERPL: 1 G/DL
ALP SERPL-CCNC: 121 U/L (ref 39–117)
ALT SERPL W P-5'-P-CCNC: 28 U/L (ref 1–33)
ANION GAP SERPL CALCULATED.3IONS-SCNC: 11.8 MMOL/L (ref 5–15)
AST SERPL-CCNC: 28 U/L (ref 1–32)
ATMOSPHERIC PRESS: 746 MMHG
BASE EXCESS BLDV CALC-SCNC: 2.9 MMOL/L (ref 0–2)
BASOPHILS # BLD AUTO: 0.09 10*3/MM3 (ref 0–0.2)
BASOPHILS NFR BLD AUTO: 0.7 % (ref 0–1.5)
BDY SITE: ABNORMAL
BILIRUB SERPL-MCNC: 0.4 MG/DL (ref 0.2–1.2)
BILIRUB UR QL STRIP: NEGATIVE
BUN BLD-MCNC: 11 MG/DL (ref 6–20)
BUN/CREAT SERPL: 14.1 (ref 7–25)
CALCIUM SPEC-SCNC: 9.3 MG/DL (ref 8.6–10.5)
CHLORIDE SERPL-SCNC: 97 MMOL/L (ref 98–107)
CLARITY UR: CLEAR
CO2 SERPL-SCNC: 27.2 MMOL/L (ref 22–29)
COHGB MFR BLD: 9.9 % (ref 0–5)
COLOR UR: YELLOW
CREAT BLD-MCNC: 0.78 MG/DL (ref 0.57–1)
D-LACTATE SERPL-SCNC: 1.6 MMOL/L (ref 0.5–2)
DEPRECATED RDW RBC AUTO: 42 FL (ref 37–54)
EOSINOPHIL # BLD AUTO: 0.12 10*3/MM3 (ref 0–0.4)
EOSINOPHIL NFR BLD AUTO: 0.9 % (ref 0.3–6.2)
ERYTHROCYTE [DISTWIDTH] IN BLOOD BY AUTOMATED COUNT: 13.6 % (ref 12.3–15.4)
GFR SERPL CREATININE-BSD FRML MDRD: 78 ML/MIN/1.73
GLOBULIN UR ELPH-MCNC: 3.8 GM/DL
GLUCOSE BLD-MCNC: 383 MG/DL (ref 65–99)
GLUCOSE BLDC GLUCOMTR-MCNC: 316 MG/DL (ref 70–130)
GLUCOSE BLDC GLUCOMTR-MCNC: 410 MG/DL (ref 70–130)
GLUCOSE UR STRIP-MCNC: ABNORMAL MG/DL
HCO3 BLDV-SCNC: 29.8 MMOL/L (ref 22–28)
HCT VFR BLD AUTO: 49.3 % (ref 34–46.6)
HGB BLD-MCNC: 16.4 G/DL (ref 12–15.9)
HGB BLDA-MCNC: 17 G/DL (ref 12–18)
HGB UR QL STRIP.AUTO: NEGATIVE
HOLD SPECIMEN: NORMAL
HOLD SPECIMEN: NORMAL
HOROWITZ INDEX BLD+IHG-RTO: 21 %
IMM GRANULOCYTES # BLD AUTO: 0.14 10*3/MM3 (ref 0–0.05)
IMM GRANULOCYTES NFR BLD AUTO: 1.1 % (ref 0–0.5)
KETONES UR QL STRIP: ABNORMAL
LEUKOCYTE ESTERASE UR QL STRIP.AUTO: NEGATIVE
LYMPHOCYTES # BLD AUTO: 3.18 10*3/MM3 (ref 0.7–3.1)
LYMPHOCYTES NFR BLD AUTO: 24.8 % (ref 19.6–45.3)
MCH RBC QN AUTO: 28.3 PG (ref 26.6–33)
MCHC RBC AUTO-ENTMCNC: 33.3 G/DL (ref 31.5–35.7)
MCV RBC AUTO: 85 FL (ref 79–97)
METHGB BLD QL: 0.8 % (ref 0–3)
MODALITY: ABNORMAL
MONOCYTES # BLD AUTO: 0.87 10*3/MM3 (ref 0.1–0.9)
MONOCYTES NFR BLD AUTO: 6.8 % (ref 5–12)
NEUTROPHILS # BLD AUTO: 8.4 10*3/MM3 (ref 1.7–7)
NEUTROPHILS NFR BLD AUTO: 65.7 % (ref 42.7–76)
NITRITE UR QL STRIP: NEGATIVE
NOTE: ABNORMAL
NRBC BLD AUTO-RTO: 0 /100 WBC (ref 0–0.2)
OXYHGB MFR BLDV: 44.8 % (ref 40–70)
PCO2 BLDV: 52.5 MM HG (ref 40–50)
PH BLDV: 7.36 PH UNITS (ref 7.32–7.42)
PH UR STRIP.AUTO: <=5 [PH] (ref 5–8)
PLATELET # BLD AUTO: 239 10*3/MM3 (ref 140–450)
PMV BLD AUTO: 10 FL (ref 6–12)
PO2 BLDV: 23.6 MM HG (ref 30–50)
POTASSIUM BLD-SCNC: 4 MMOL/L (ref 3.5–5.2)
PROT SERPL-MCNC: 7.7 G/DL (ref 6–8.5)
PROT UR QL STRIP: NEGATIVE
RBC # BLD AUTO: 5.8 10*6/MM3 (ref 3.77–5.28)
SAO2 % BLDCOV: 50.2 % (ref 45–75)
SODIUM BLD-SCNC: 136 MMOL/L (ref 136–145)
SP GR UR STRIP: >=1.03 (ref 1–1.03)
TROPONIN T SERPL-MCNC: <0.01 NG/ML (ref 0–0.03)
UROBILINOGEN UR QL STRIP: ABNORMAL
VENTILATOR MODE: ABNORMAL
WBC NRBC COR # BLD: 12.8 10*3/MM3 (ref 3.4–10.8)
WHOLE BLOOD HOLD SPECIMEN: NORMAL
WHOLE BLOOD HOLD SPECIMEN: NORMAL

## 2019-12-12 PROCEDURE — 82962 GLUCOSE BLOOD TEST: CPT

## 2019-12-12 PROCEDURE — 82805 BLOOD GASES W/O2 SATURATION: CPT

## 2019-12-12 PROCEDURE — 96374 THER/PROPH/DIAG INJ IV PUSH: CPT

## 2019-12-12 PROCEDURE — 93005 ELECTROCARDIOGRAM TRACING: CPT | Performed by: PHYSICIAN ASSISTANT

## 2019-12-12 PROCEDURE — 25010000002 ONDANSETRON PER 1 MG: Performed by: PHYSICIAN ASSISTANT

## 2019-12-12 PROCEDURE — 82820 HEMOGLOBIN-OXYGEN AFFINITY: CPT

## 2019-12-12 PROCEDURE — 80053 COMPREHEN METABOLIC PANEL: CPT | Performed by: PHYSICIAN ASSISTANT

## 2019-12-12 PROCEDURE — 99283 EMERGENCY DEPT VISIT LOW MDM: CPT

## 2019-12-12 PROCEDURE — 85025 COMPLETE CBC W/AUTO DIFF WBC: CPT | Performed by: PHYSICIAN ASSISTANT

## 2019-12-12 PROCEDURE — 71046 X-RAY EXAM CHEST 2 VIEWS: CPT

## 2019-12-12 PROCEDURE — 84484 ASSAY OF TROPONIN QUANT: CPT | Performed by: PHYSICIAN ASSISTANT

## 2019-12-12 PROCEDURE — 81003 URINALYSIS AUTO W/O SCOPE: CPT | Performed by: PHYSICIAN ASSISTANT

## 2019-12-12 PROCEDURE — 96361 HYDRATE IV INFUSION ADD-ON: CPT

## 2019-12-12 PROCEDURE — 83605 ASSAY OF LACTIC ACID: CPT | Performed by: PHYSICIAN ASSISTANT

## 2019-12-12 RX ORDER — SODIUM CHLORIDE 0.9 % (FLUSH) 0.9 %
10 SYRINGE (ML) INJECTION AS NEEDED
Status: DISCONTINUED | OUTPATIENT
Start: 2019-12-12 | End: 2019-12-12 | Stop reason: HOSPADM

## 2019-12-12 RX ORDER — ONDANSETRON 4 MG/1
4 TABLET, ORALLY DISINTEGRATING ORAL EVERY 6 HOURS PRN
Qty: 20 TABLET | Refills: 0 | Status: SHIPPED | OUTPATIENT
Start: 2019-12-12 | End: 2020-11-19

## 2019-12-12 RX ORDER — ONDANSETRON 2 MG/ML
4 INJECTION INTRAMUSCULAR; INTRAVENOUS ONCE
Status: COMPLETED | OUTPATIENT
Start: 2019-12-12 | End: 2019-12-12

## 2019-12-12 RX ORDER — ALBUTEROL SULFATE 90 UG/1
2 AEROSOL, METERED RESPIRATORY (INHALATION) EVERY 6 HOURS PRN
Qty: 1 INHALER | Refills: 0 | Status: SHIPPED | OUTPATIENT
Start: 2019-12-12 | End: 2021-06-18 | Stop reason: SDUPTHER

## 2019-12-12 RX ADMIN — ONDANSETRON 4 MG: 2 INJECTION INTRAMUSCULAR; INTRAVENOUS at 10:23

## 2019-12-12 RX ADMIN — SODIUM CHLORIDE 1000 ML: 9 INJECTION, SOLUTION INTRAVENOUS at 09:40

## 2019-12-12 NOTE — ED PROVIDER NOTES
"Subjective   This patient states her blood glucose was 600 prior to arrival and she took 15 units of regular insulin.  She has history of insulin-dependent diabetes and takes sliding scale throughout the day and Toujeo at night.  She states for the past week or 2 she has had a productive cough with yellow and white sputum and \"smothering\" in her chest.  She denies any chest pain.  She has had no vomiting but has nausea.  No abdominal pain.  She states she has had elevated blood glucose in the past but never felt this poorly with it.  She denies any history of DKA.  She does states she is recently had an upper respiratory tract infection including ear pain and sinus pain.          Review of Systems   Constitutional: Negative.    HENT: Positive for ear pain, sinus pressure and sinus pain.    Eyes: Negative.    Respiratory: Positive for cough.         \"Smothering\"   Cardiovascular: Negative.  Negative for chest pain.   Gastrointestinal: Positive for nausea. Negative for abdominal pain and vomiting.   Genitourinary: Negative.    Musculoskeletal: Negative.    Skin: Negative.    Neurological: Negative.    Psychiatric/Behavioral: Negative.        Past Medical History:   Diagnosis Date   • Acid reflux    • Arthritis    • Asthma    • Chronic bronchitis (CMS/HCC)    • Colon polyp 2015   • Diabetes (CMS/[facility])    • Diverticulitis    • Diverticulosis    • Fracture     lateral left ankle   • Gallstones    • Gout    • Headache    • Hypertension    • Ileitis 2019   • Low back pain    • Neuromuscular disorder (CMS/HCC)    • Thyroid disease    • Visual impairment        No Known Allergies    Past Surgical History:   Procedure Laterality Date   •  SECTION     • CHOLECYSTECTOMY     • COLONOSCOPY  ,    DR LYLES,DR MARISCAL   • DILATATION AND CURETTAGE     • ENDOSCOPY      DR LOIDA ROBERTS   • FOOT SURGERY     • HYSTERECTOMY     • UPPER GASTROINTESTINAL ENDOSCOPY  2015       Family " History   Problem Relation Age of Onset   • Arthritis Mother    • Mental illness Mother    • Migraines Mother    • Osteoporosis Mother    • Thyroid disease Mother    • Depression Mother    • Heart disease Mother    • Colon cancer Father         passed away at age 53   • Cancer Father    • Thyroid disease Sister        Social History     Socioeconomic History   • Marital status:      Spouse name: Not on file   • Number of children: Not on file   • Years of education: Not on file   • Highest education level: Not on file   Tobacco Use   • Smoking status: Current Every Day Smoker     Packs/day: 1.00     Years: 20.00     Pack years: 20.00     Types: Cigarettes   • Smokeless tobacco: Never Used   Substance and Sexual Activity   • Alcohol use: No     Frequency: Never   • Drug use: No   • Sexual activity: Yes     Partners: Male     Birth control/protection: Surgical           Objective   Physical Exam   Constitutional: She appears well-developed and well-nourished.   HENT:   Head: Normocephalic and atraumatic.   Neck: Normal range of motion.   Cardiovascular: Normal rate and regular rhythm.   Pulmonary/Chest: Effort normal. She has wheezes.   Abdominal: Soft. There is no tenderness.   Musculoskeletal: Normal range of motion.   Neurological: She is alert.   Skin: Skin is warm and dry.   Psychiatric: She has a normal mood and affect. Her behavior is normal. Thought content normal.   Nursing note and vitals reviewed.      Procedures           ED Course  ED Course as of Dec 12 1123   Thu Dec 12, 2019   0939 EKG interpreted by me: Sinus rhythm, normal rate, no acute ST/T changes, low voltage QRS complexes, this is an atypical EKG    [MP]      ED Course User Index  [MP] Aristides Wren MD      11:23 AM  Work-up reassuring here.  Blood glucose on CMP below 400 on initial check on CMP.  She has had 500 cc of normal saline.  Will plan to recheck after administration of the full liter.  Patient requests albuterol  inhaler given wheezing and cough.  Will avoid steroids given hyperglycemia.  Also will give Zofran for nausea                  No data recorded                        MDM    Final diagnoses:   Hyperglycemia   Nausea   Upper respiratory tract infection, unspecified type              Ulises Powers PA-C  12/12/19 1123       Ulises Powers PA-C  12/12/19 1123

## 2020-01-06 ENCOUNTER — OFFICE VISIT (OUTPATIENT)
Dept: INTERNAL MEDICINE | Facility: CLINIC | Age: 50
End: 2020-01-06

## 2020-01-06 VITALS
BODY MASS INDEX: 41.99 KG/M2 | HEIGHT: 63 IN | DIASTOLIC BLOOD PRESSURE: 80 MMHG | HEART RATE: 77 BPM | WEIGHT: 237 LBS | OXYGEN SATURATION: 96 % | TEMPERATURE: 98.3 F | RESPIRATION RATE: 18 BRPM | SYSTOLIC BLOOD PRESSURE: 125 MMHG

## 2020-01-06 DIAGNOSIS — I83.812 VARICOSE VEINS OF LEFT LOWER EXTREMITY WITH PAIN: ICD-10-CM

## 2020-01-06 DIAGNOSIS — B37.31 YEAST INFECTION INVOLVING THE VAGINA AND SURROUNDING AREA: ICD-10-CM

## 2020-01-06 DIAGNOSIS — Z79.4 TYPE 2 DIABETES MELLITUS WITH HYPERGLYCEMIA, WITH LONG-TERM CURRENT USE OF INSULIN (HCC): Primary | ICD-10-CM

## 2020-01-06 DIAGNOSIS — M79.605 LEFT LEG PAIN: ICD-10-CM

## 2020-01-06 DIAGNOSIS — E66.01 MORBID OBESITY (HCC): ICD-10-CM

## 2020-01-06 DIAGNOSIS — E11.59 HYPERTENSION ASSOCIATED WITH DIABETES (HCC): ICD-10-CM

## 2020-01-06 DIAGNOSIS — E11.65 TYPE 2 DIABETES MELLITUS WITH HYPERGLYCEMIA, WITH LONG-TERM CURRENT USE OF INSULIN (HCC): Primary | ICD-10-CM

## 2020-01-06 DIAGNOSIS — I15.2 HYPERTENSION ASSOCIATED WITH DIABETES (HCC): ICD-10-CM

## 2020-01-06 LAB
HBA1C MFR BLD: 11.4 %
POC CREATININE URINE: 200
POC MICROALBUMIN URINE: 30

## 2020-01-06 PROCEDURE — 82044 UR ALBUMIN SEMIQUANTITATIVE: CPT | Performed by: FAMILY MEDICINE

## 2020-01-06 PROCEDURE — 99214 OFFICE O/P EST MOD 30 MIN: CPT | Performed by: FAMILY MEDICINE

## 2020-01-06 PROCEDURE — 83036 HEMOGLOBIN GLYCOSYLATED A1C: CPT | Performed by: FAMILY MEDICINE

## 2020-01-06 RX ORDER — FLUCONAZOLE 150 MG/1
TABLET ORAL
Qty: 2 TABLET | Refills: 2 | Status: SHIPPED | OUTPATIENT
Start: 2020-01-06 | End: 2020-03-09 | Stop reason: SDUPTHER

## 2020-01-06 NOTE — PROGRESS NOTES
Subjective    Karli Ricardo is a 49 y.o. female here for:  Chief Complaint   Patient presents with   • Diabetes     Pt states her sugar levels are up and down. She just got out of the hospital because her levels were over 600. Last visit here, she started Humalog byt not sure it is helping.     • Hypertension     Unsure how her BP is. Admits to having dizzy spells, blurred vision. and headaches.        Vaginal yeast infections: recurrent issue, currently bothersome again with itching and white discharge. Associated with hyperglycemia from uncontrolled diabetes mellitus. Diflucan helps.     Also mentions pain in left lower leg for about a month. No history of dvt but has varicose veins, some pain in the area of them. No chest pain, no shortness of breath.    Diabetes   She presents for her follow-up diabetic visit. She has type 2 diabetes mellitus. Her disease course has been fluctuating. Associated symptoms include fatigue, foot paresthesias, polydipsia and polyuria. Diabetic complications include peripheral neuropathy. Pertinent negatives for diabetic complications include no CVA or retinopathy. (Vaginal yeast infections, recurrent.) Risk factors for coronary artery disease include obesity, diabetes mellitus, sedentary lifestyle, post-menopausal, stress, hypertension and tobacco exposure. Current diabetic treatment includes oral agent (monotherapy) and insulin injections. She is compliant with treatment some of the time. Her weight is fluctuating minimally. She is following a generally unhealthy diet. Home blood sugar record trend: overall high. An ACE inhibitor/angiotensin II receptor blocker is being taken. Eye exam is current.   Obesity   This is a chronic problem. The current episode started more than 1 year ago. The problem occurs daily. The problem has been waxing and waning. Associated symptoms include arthralgias and fatigue. The symptoms are aggravated by stress and eating (diabetes (insulin use)).  "Treatments tried: metformin; soloquia. The treatment provided no relief.   Hypertension   This is a chronic problem. The current episode started more than 1 year ago. The problem has been waxing and waning since onset. There are no associated agents to hypertension. Risk factors for coronary artery disease include diabetes mellitus, obesity, sedentary lifestyle, stress and smoking/tobacco exposure. Past treatments include ACE inhibitors. Current antihypertension treatment includes ACE inhibitors. The current treatment provides moderate improvement. Compliance problems include exercise and diet.  There is no history of CVA or retinopathy. There is no history of a thyroid problem.          The following portions of the patient's history were reviewed and updated as appropriate: allergies, current medications, past family history, past medical history, past social history, past surgical history and problem list.    Review of Systems   Constitutional: Positive for fatigue.   Cardiovascular: Positive for leg swelling.   Endocrine: Positive for polydipsia and polyuria.   Genitourinary: Positive for vaginal discharge (itching).   Musculoskeletal: Positive for arthralgias.       Objective   Visit Vitals  /80   Pulse 77   Temp 98.3 °F (36.8 °C) (Temporal)   Resp 18   Ht 160 cm (62.99\")   Wt 108 kg (237 lb)   SpO2 96%   BMI 41.99 kg/m²       Physical Exam   Constitutional: She is oriented to person, place, and time. Vital signs are normal. She appears well-developed and well-nourished. She is active.  Non-toxic appearance. She does not have a sickly appearance. She does not appear ill. No distress. She is morbidly obese.  HENT:   Head: Normocephalic and atraumatic. Hair is normal.   Right Ear: Hearing and external ear normal.   Left Ear: Hearing and external ear normal.   Nose: Nose normal.   Mouth/Throat: Mucous membranes are not dry. No trismus in the jaw.   Eyes: Pupils are equal, round, and reactive to light. " Conjunctivae, EOM and lids are normal. No scleral icterus.   Neck: Phonation normal. Neck supple. No tracheal deviation present.   Cardiovascular: Normal rate, regular rhythm and normal heart sounds.   No murmur heard.  Pulses:       Dorsalis pedis pulses are 2+ on the left side.   Varicosities left lower extremity   Pulmonary/Chest: Effort normal and breath sounds normal.   Musculoskeletal: She exhibits no edema or deformity.        Left lower leg: She exhibits tenderness. She exhibits no swelling and no deformity.   Neurological: She is alert and oriented to person, place, and time. She displays no tremor. No cranial nerve deficit. She exhibits normal muscle tone. Gait normal.   Skin: Skin is warm. Turgor is normal. She is not diaphoretic. No cyanosis. No pallor. Nails show no clubbing.   Psychiatric: She has a normal mood and affect. Her speech is normal and behavior is normal. Judgment and thought content normal. Cognition and memory are normal.   Nursing note and vitals reviewed.      Lab Results   Component Value Date    HGBA1C 11.4 01/06/2020    HGBA1C 13.1 10/01/2019    HGBA1C 9.7 05/01/2019     ER note from 12/12/19 reviewed.     Assessment/Plan     Problem List Items Addressed This Visit        Cardiovascular and Mediastinum    Hypertension associated with diabetes (CMS/HCC)    Current Assessment & Plan     Hypertension is improving with treatment.  Weight loss encouraged.  Continue current medications.  Ambulatory blood pressure monitoring.  Blood pressure will be reassessed at the next regular appointment.         Relevant Medications    Insulin Glargine, 1 Unit Dial, (TOUJEO SOLOSTAR) 300 UNIT/ML solution pen-injector injection       Digestive    Morbid obesity (CMS/HCC)       Endocrine    Type 2 diabetes mellitus with hyperglycemia, with long-term current use of insulin (CMS/HCC) - Primary    Relevant Medications    Insulin Glargine, 1 Unit Dial, (TOUJEO SOLOSTAR) 300 UNIT/ML solution pen-injector  injection    Other Relevant Orders    POC Glycosylated Hemoglobin (Hb A1C) (Completed)    POCT microalbumin (Completed)      Other Visit Diagnoses     Left leg pain        Relevant Orders    US Venous Doppler Lower Extremity Left (duplex)    Varicose veins of left lower extremity with pain        Relevant Orders    US Venous Doppler Lower Extremity Left (duplex)    Yeast infection involving the vagina and surrounding area        Relevant Medications    fluconazole (DIFLUCAN) 150 MG tablet          · Patient's Body mass index is 41.99 kg/m². BMI is above normal parameters. Recommendations include: nutrition counseling.        Lovely Loyd MD

## 2020-01-08 ENCOUNTER — HOSPITAL ENCOUNTER (OUTPATIENT)
Dept: ULTRASOUND IMAGING | Facility: HOSPITAL | Age: 50
Discharge: HOME | End: 2020-01-08
Admitting: FAMILY MEDICINE

## 2020-01-08 PROBLEM — R12 HEARTBURN: Status: RESOLVED | Noted: 2019-06-13 | Resolved: 2020-01-08

## 2020-01-08 PROBLEM — R07.81 RIB PAIN ON LEFT SIDE: Status: RESOLVED | Noted: 2017-04-28 | Resolved: 2020-01-08

## 2020-01-08 PROBLEM — R11.0 NAUSEA: Status: RESOLVED | Noted: 2019-06-13 | Resolved: 2020-01-08

## 2020-01-08 PROBLEM — J01.40 ACUTE NON-RECURRENT PANSINUSITIS: Status: RESOLVED | Noted: 2019-10-22 | Resolved: 2020-01-08

## 2020-01-08 PROCEDURE — 93971 EXTREMITY STUDY: CPT

## 2020-01-08 NOTE — ASSESSMENT & PLAN NOTE
Hypertension is improving with treatment.  Weight loss encouraged.  Continue current medications.  Ambulatory blood pressure monitoring.  Blood pressure will be reassessed at the next regular appointment.

## 2020-02-03 ENCOUNTER — OFFICE VISIT (OUTPATIENT)
Dept: INTERNAL MEDICINE | Facility: CLINIC | Age: 50
End: 2020-02-03

## 2020-02-03 VITALS
HEART RATE: 70 BPM | TEMPERATURE: 97 F | DIASTOLIC BLOOD PRESSURE: 80 MMHG | WEIGHT: 237.25 LBS | BODY MASS INDEX: 42.04 KG/M2 | RESPIRATION RATE: 18 BRPM | SYSTOLIC BLOOD PRESSURE: 128 MMHG | HEIGHT: 63 IN | OXYGEN SATURATION: 97 %

## 2020-02-03 DIAGNOSIS — R29.898 NECK TIGHTNESS: ICD-10-CM

## 2020-02-03 DIAGNOSIS — R79.9 ABNORMAL BLOOD CHEMISTRY: ICD-10-CM

## 2020-02-03 DIAGNOSIS — R00.2 PALPITATIONS: ICD-10-CM

## 2020-02-03 DIAGNOSIS — E11.65 TYPE 2 DIABETES MELLITUS WITH HYPERGLYCEMIA, WITH LONG-TERM CURRENT USE OF INSULIN (HCC): ICD-10-CM

## 2020-02-03 DIAGNOSIS — Z79.4 TYPE 2 DIABETES MELLITUS WITH HYPERGLYCEMIA, WITH LONG-TERM CURRENT USE OF INSULIN (HCC): ICD-10-CM

## 2020-02-03 DIAGNOSIS — E04.9 THYROID ENLARGEMENT: Primary | ICD-10-CM

## 2020-02-03 DIAGNOSIS — Z82.49 FAMILY HISTORY OF HEART DISEASE: ICD-10-CM

## 2020-02-03 PROCEDURE — 93000 ELECTROCARDIOGRAM COMPLETE: CPT | Performed by: FAMILY MEDICINE

## 2020-02-03 PROCEDURE — 99214 OFFICE O/P EST MOD 30 MIN: CPT | Performed by: FAMILY MEDICINE

## 2020-02-03 NOTE — PROGRESS NOTES
Subjective    Karli Ricardo is a 49 y.o. female here for:  Chief Complaint   Patient presents with   • Hypertension     Pt states she does not have a way to check her BP at home, so she is unsure of how ocntrolled her BP is.    • Chest Pain     Tightness of her throat and heart palpitations more often in the past week. No more stress than normal. No correlation between attacks and life experiences.        History per MA reviewed.     Diabetes   She presents for her follow-up diabetic visit. She has type 2 diabetes mellitus. Her disease course has been fluctuating. Associated symptoms include fatigue, foot paresthesias, polydipsia and polyuria. Diabetic complications include peripheral neuropathy. (Vaginal yeast infections, recurrent.) Risk factors for coronary artery disease include obesity, diabetes mellitus, sedentary lifestyle, post-menopausal, stress, hypertension and tobacco exposure. Current diabetic treatment includes oral agent (monotherapy) and insulin injections. She is compliant with treatment some of the time. Her weight is fluctuating minimally. She is following a generally unhealthy diet. An ACE inhibitor/angiotensin II receptor blocker is being taken. Eye exam is current.   Obesity   This is a chronic problem. The current episode started more than 1 year ago. The problem occurs daily. The problem has been waxing and waning. Associated symptoms include arthralgias, fatigue and myalgias. The symptoms are aggravated by stress and eating (diabetes (insulin use)). Treatments tried: metformin; soloquia. The treatment provided no relief.        Has been feeling a tightness at times, not worse on left or right. Comes and goes. Feels like a pressure. Some trouble swallowing at times. Has also felt some palpitations as well. Mother had heart issues that started around patient's age. Required stents, etc.  Pt feels neck is puffy, possibly thyroid enlarged. Was on thyroid medicine at one point then taken  "off.     The following portions of the patient's history were reviewed and updated as appropriate: allergies, current medications, past family history, past medical history, past social history, past surgical history and problem list.    Review of Systems   Constitutional: Positive for fatigue.   HENT: Positive for trouble swallowing.    Respiratory: Positive for chest tightness.    Endocrine: Positive for polydipsia and polyuria.   Musculoskeletal: Positive for arthralgias and myalgias.   Psychiatric/Behavioral: Positive for stress.       Objective   Visit Vitals  /80   Pulse 70   Temp 97 °F (36.1 °C) (Temporal)   Resp 18   Ht 160 cm (62.99\")   Wt 108 kg (237 lb 4 oz)   SpO2 97%   BMI 42.04 kg/m²       Physical Exam   Constitutional: She is oriented to person, place, and time. Vital signs are normal. She appears well-developed and well-nourished. She is active.  Non-toxic appearance. She does not have a sickly appearance. She does not appear ill. No distress. She is morbidly obese.  HENT:   Head: Normocephalic and atraumatic. Hair is normal.   Right Ear: Hearing and external ear normal.   Left Ear: Hearing and external ear normal.   Nose: Nose normal.   Mouth/Throat: Mucous membranes are not dry. No trismus in the jaw.   Eyes: Pupils are equal, round, and reactive to light. Conjunctivae, EOM and lids are normal. No scleral icterus.   Neck: Phonation normal. Neck supple. No tracheal deviation present. Thyromegaly present.   Cardiovascular: Normal rate, regular rhythm and normal heart sounds.   No murmur heard.  Pulmonary/Chest: Effort normal and breath sounds normal.   Musculoskeletal: She exhibits no edema or deformity.   Neurological: She is alert and oriented to person, place, and time. She displays no tremor. No cranial nerve deficit. She exhibits normal muscle tone. Gait normal.   Skin: Skin is warm. Turgor is normal. No rash noted. She is not diaphoretic. No cyanosis. No pallor. Nails show no clubbing. "   Psychiatric: She has a normal mood and affect. Her speech is normal and behavior is normal. Judgment and thought content normal. Cognition and memory are normal.   Nursing note and vitals reviewed.      Lab Results   Component Value Date    HGBA1C 11.4 01/06/2020    HGBA1C 13.1 10/01/2019    HGBA1C 9.7 05/01/2019     Lab Results   Component Value Date    TSH 3.770 10/02/2019     Lab Results   Component Value Date    FREET4 0.95 10/02/2019           ECG 12 Lead  Date/Time: 2/3/2020 4:07 PM  Performed by: Lovely Loyd MD  Authorized by: Lovely Loyd MD   Comparison: compared with previous ECG from 12/12/2019  Similar to previous ECG  Comparison to previous ECG: Slightly changed III but otherwise similar  Rhythm: sinus rhythm  Rate: normal  BPM: 66  Conduction: conduction normal  Q waves: III (no other q waves noted)    ST Segments: ST segments normal  T Waves: T waves normal  QRS axis: normal  Other: no other findings    Clinical impression: non-specific ECG              Assessment/Plan     Problem List Items Addressed This Visit        Endocrine    Type 2 diabetes mellitus with hyperglycemia, with long-term current use of insulin (CMS/[facility])    Current Assessment & Plan     Diabetes is improving with treatment.  Pt reports sugars coming down slowly.  Continue current treatment regimen.  Diabetes will be reassessed next visit..           Other Visit Diagnoses     Thyroid enlargement    -  Primary    Relevant Orders    US Thyroid    Palpitations        Relevant Orders    Ambulatory Referral to Cardiology    TSH+Free T4    Magnesium    ECG 12 Lead    Abnormal blood chemistry        Relevant Orders    Ambulatory Referral to Cardiology    CBC & Differential    Comprehensive Metabolic Panel    TSH+Free T4    Magnesium    Transferrin Saturation    Ferritin    Neck tightness        Relevant Orders    Ambulatory Referral to Cardiology    ECG 12 Lead    Family history of heart disease        Relevant Orders     ECG 12 Lead          ·       Lovely Loyd MD

## 2020-02-03 NOTE — ASSESSMENT & PLAN NOTE
Diabetes is improving with treatment.  Pt reports sugars coming down slowly.  Continue current treatment regimen.  Diabetes will be reassessed next visit..

## 2020-02-04 ENCOUNTER — APPOINTMENT (OUTPATIENT)
Dept: LAB | Facility: HOSPITAL | Age: 50
End: 2020-02-04

## 2020-02-04 ENCOUNTER — HOSPITAL ENCOUNTER (OUTPATIENT)
Dept: ULTRASOUND IMAGING | Facility: HOSPITAL | Age: 50
Discharge: HOME | End: 2020-02-04
Admitting: FAMILY MEDICINE

## 2020-02-04 LAB
ALBUMIN SERPL-MCNC: 4.1 G/DL (ref 3.5–5.2)
ALBUMIN/GLOB SERPL: 1.1 G/DL
ALP SERPL-CCNC: 117 U/L (ref 39–117)
ALT SERPL W P-5'-P-CCNC: 30 U/L (ref 1–33)
ANION GAP SERPL CALCULATED.3IONS-SCNC: 14 MMOL/L (ref 5–15)
AST SERPL-CCNC: 29 U/L (ref 1–32)
BASOPHILS # BLD AUTO: 0.06 10*3/MM3 (ref 0–0.2)
BASOPHILS NFR BLD AUTO: 0.6 % (ref 0–1.5)
BILIRUB SERPL-MCNC: 0.4 MG/DL (ref 0.2–1.2)
BUN BLD-MCNC: 9 MG/DL (ref 6–20)
BUN/CREAT SERPL: 11.1 (ref 7–25)
CALCIUM SPEC-SCNC: 9.4 MG/DL (ref 8.6–10.5)
CHLORIDE SERPL-SCNC: 96 MMOL/L (ref 98–107)
CO2 SERPL-SCNC: 27 MMOL/L (ref 22–29)
CREAT BLD-MCNC: 0.81 MG/DL (ref 0.57–1)
DEPRECATED RDW RBC AUTO: 39.4 FL (ref 37–54)
EOSINOPHIL # BLD AUTO: 0.09 10*3/MM3 (ref 0–0.4)
EOSINOPHIL NFR BLD AUTO: 0.8 % (ref 0.3–6.2)
ERYTHROCYTE [DISTWIDTH] IN BLOOD BY AUTOMATED COUNT: 13.1 % (ref 12.3–15.4)
FERRITIN SERPL-MCNC: 293 NG/ML (ref 13–150)
GFR SERPL CREATININE-BSD FRML MDRD: 75 ML/MIN/1.73
GLOBULIN UR ELPH-MCNC: 3.6 GM/DL
GLUCOSE BLD-MCNC: 309 MG/DL (ref 65–99)
HCT VFR BLD AUTO: 48.5 % (ref 34–46.6)
HGB BLD-MCNC: 16.5 G/DL (ref 12–15.9)
IMM GRANULOCYTES # BLD AUTO: 0.09 10*3/MM3 (ref 0–0.05)
IMM GRANULOCYTES NFR BLD AUTO: 0.8 % (ref 0–0.5)
LYMPHOCYTES # BLD AUTO: 3.78 10*3/MM3 (ref 0.7–3.1)
LYMPHOCYTES NFR BLD AUTO: 35.5 % (ref 19.6–45.3)
MAGNESIUM SERPL-MCNC: 2 MG/DL (ref 1.6–2.6)
MCH RBC QN AUTO: 28.4 PG (ref 26.6–33)
MCHC RBC AUTO-ENTMCNC: 34 G/DL (ref 31.5–35.7)
MCV RBC AUTO: 83.6 FL (ref 79–97)
MONOCYTES # BLD AUTO: 0.68 10*3/MM3 (ref 0.1–0.9)
MONOCYTES NFR BLD AUTO: 6.4 % (ref 5–12)
NEUTROPHILS # BLD AUTO: 5.94 10*3/MM3 (ref 1.7–7)
NEUTROPHILS NFR BLD AUTO: 55.9 % (ref 42.7–76)
NRBC BLD AUTO-RTO: 0 /100 WBC (ref 0–0.2)
PLATELET # BLD AUTO: 260 10*3/MM3 (ref 140–450)
PMV BLD AUTO: 10.9 FL (ref 6–12)
POTASSIUM BLD-SCNC: 4 MMOL/L (ref 3.5–5.2)
PROT SERPL-MCNC: 7.7 G/DL (ref 6–8.5)
RBC # BLD AUTO: 5.8 10*6/MM3 (ref 3.77–5.28)
SODIUM BLD-SCNC: 137 MMOL/L (ref 136–145)
T4 FREE SERPL-MCNC: 1 NG/DL (ref 0.93–1.7)
TSH SERPL DL<=0.05 MIU/L-ACNC: 5.26 UIU/ML (ref 0.27–4.2)
WBC NRBC COR # BLD: 10.64 10*3/MM3 (ref 3.4–10.8)

## 2020-02-04 PROCEDURE — 84466 ASSAY OF TRANSFERRIN: CPT | Performed by: FAMILY MEDICINE

## 2020-02-04 PROCEDURE — 84443 ASSAY THYROID STIM HORMONE: CPT | Performed by: FAMILY MEDICINE

## 2020-02-04 PROCEDURE — 85025 COMPLETE CBC W/AUTO DIFF WBC: CPT | Performed by: FAMILY MEDICINE

## 2020-02-04 PROCEDURE — 82728 ASSAY OF FERRITIN: CPT | Performed by: FAMILY MEDICINE

## 2020-02-04 PROCEDURE — 84439 ASSAY OF FREE THYROXINE: CPT | Performed by: FAMILY MEDICINE

## 2020-02-04 PROCEDURE — 83735 ASSAY OF MAGNESIUM: CPT | Performed by: FAMILY MEDICINE

## 2020-02-04 PROCEDURE — 36415 COLL VENOUS BLD VENIPUNCTURE: CPT | Performed by: FAMILY MEDICINE

## 2020-02-04 PROCEDURE — 80053 COMPREHEN METABOLIC PANEL: CPT | Performed by: FAMILY MEDICINE

## 2020-02-04 PROCEDURE — 83540 ASSAY OF IRON: CPT | Performed by: FAMILY MEDICINE

## 2020-02-04 PROCEDURE — 76536 US EXAM OF HEAD AND NECK: CPT

## 2020-02-06 LAB
CONV TRANSFERRIN SAT: 20 % SATURATION
IRON SERPL-MCNC: 68 UG/DL
TRANSFERRIN SERPL-MCNC: 248 MG/DL

## 2020-02-21 ENCOUNTER — TELEPHONE (OUTPATIENT)
Dept: INTERNAL MEDICINE | Facility: CLINIC | Age: 50
End: 2020-02-21

## 2020-02-21 NOTE — TELEPHONE ENCOUNTER
Pt called to get a update on [facility] paperwork she dropped off a few days ago. Please advise    Pt can be reached at [phone]

## 2020-03-09 DIAGNOSIS — B37.31 YEAST INFECTION INVOLVING THE VAGINA AND SURROUNDING AREA: ICD-10-CM

## 2020-03-09 RX ORDER — FLUCONAZOLE 150 MG/1
TABLET ORAL
Qty: 2 TABLET | Refills: 2 | Status: SHIPPED | OUTPATIENT
Start: 2020-03-09 | End: 2020-05-07 | Stop reason: SDUPTHER

## 2020-03-10 ENCOUNTER — TELEPHONE (OUTPATIENT)
Dept: CARDIOLOGY | Facility: CLINIC | Age: 50
End: 2020-03-10

## 2020-05-07 DIAGNOSIS — B37.31 YEAST INFECTION INVOLVING THE VAGINA AND SURROUNDING AREA: ICD-10-CM

## 2020-05-07 RX ORDER — FLUCONAZOLE 150 MG/1
TABLET ORAL
Qty: 2 TABLET | Refills: 2 | Status: SHIPPED | OUTPATIENT
Start: 2020-05-07 | End: 2020-08-19 | Stop reason: SDUPTHER

## 2020-08-19 DIAGNOSIS — B37.31 YEAST INFECTION INVOLVING THE VAGINA AND SURROUNDING AREA: ICD-10-CM

## 2020-08-19 RX ORDER — FLUCONAZOLE 150 MG/1
TABLET ORAL
Qty: 2 TABLET | Refills: 2 | Status: SHIPPED | OUTPATIENT
Start: 2020-08-19 | End: 2020-09-25 | Stop reason: SDUPTHER

## 2020-08-19 NOTE — TELEPHONE ENCOUNTER
Patient requested a refill of fluconazole (DIFLUCAN) 150 MG tablet.    Please call and advise. Patient call back [phone]    [id number] - [place], [place] - [address] - [phone]  - [phone] FX

## 2020-08-25 NOTE — TELEPHONE ENCOUNTER
----- Message from Karli Ricardo sent at 9/28/2017  6:52 PM EDT -----  Regarding: Prescription Question  Contact: [phone]  [facility] Thank you   monitor cr closely

## 2020-09-04 ENCOUNTER — TELEPHONE (OUTPATIENT)
Dept: INTERNAL MEDICINE | Facility: CLINIC | Age: 50
End: 2020-09-04

## 2020-09-04 NOTE — TELEPHONE ENCOUNTER
PT CALLED AND WANTED TO KNOW IF DR KAPADIA WOULD SEND A PRESCRIPTION FOR CHANTIX? PLEASE ADVISE    [facility] [place]     [place]: [phone]

## 2020-09-04 NOTE — TELEPHONE ENCOUNTER
Pt due for visit. Has not been seen since February, and diabetic. Due for A1C, possibly other labs. We can discuss Chantix at appointment.

## 2020-09-25 ENCOUNTER — TELEPHONE (OUTPATIENT)
Dept: INTERNAL MEDICINE | Facility: CLINIC | Age: 50
End: 2020-09-25

## 2020-09-25 DIAGNOSIS — B37.31 YEAST INFECTION INVOLVING THE VAGINA AND SURROUNDING AREA: ICD-10-CM

## 2020-09-25 RX ORDER — FLUCONAZOLE 150 MG/1
TABLET ORAL
Qty: 2 TABLET | Refills: 0 | Status: SHIPPED | OUTPATIENT
Start: 2020-09-25 | End: 2020-11-13 | Stop reason: SDUPTHER

## 2020-09-25 NOTE — TELEPHONE ENCOUNTER
PT CALLED TO REQUEST A PRESCRIPTION FOR DIFLUCAN. PT STATED THAT SHE HAS A YEAST INFECTION.    PT CONTACT [phone]     PHARMACY VERIFIED [id number] - [place], [place] - [address] - [phone] PH - [phone] FX

## 2020-10-13 ENCOUNTER — TELEPHONE (OUTPATIENT)
Dept: SURGERY | Facility: CLINIC | Age: 50
End: 2020-10-13

## 2020-11-13 DIAGNOSIS — B37.31 YEAST INFECTION INVOLVING THE VAGINA AND SURROUNDING AREA: ICD-10-CM

## 2020-11-13 NOTE — TELEPHONE ENCOUNTER
Caller: Karli Ricardo    Relationship: Self    Best call back number: [phone]     Medication needed:   Requested Prescriptions     Pending Prescriptions Disp Refills   • fluconazole (DIFLUCAN) 150 MG tablet 2 tablet 0     Sig: TAKE 1 TABLET BY MOUTH NOW, then repeat in 4 days if needed       When do you need the refill by: TODAY    What details did the patient provide when requesting the medication: PT IS OUT OF MED. PT ALSO ASKED TO ADD REFILLS FOR THE MED.    Does the patient have less than a 3 day supply:  [x] Yes  [] No    What is the patient's preferred pharmacy: [id number] - [place], [place] - [address] - [phone]  - [phone] FX

## 2020-11-16 RX ORDER — FLUCONAZOLE 150 MG/1
TABLET ORAL
Qty: 2 TABLET | Refills: 0 | Status: SHIPPED | OUTPATIENT
Start: 2020-11-16 | End: 2020-11-19 | Stop reason: SDUPTHER

## 2020-11-19 ENCOUNTER — OFFICE VISIT (OUTPATIENT)
Dept: INTERNAL MEDICINE | Facility: CLINIC | Age: 50
End: 2020-11-19

## 2020-11-19 VITALS
HEIGHT: 63 IN | SYSTOLIC BLOOD PRESSURE: 133 MMHG | BODY MASS INDEX: 37.56 KG/M2 | HEART RATE: 83 BPM | TEMPERATURE: 97.7 F | WEIGHT: 212 LBS | OXYGEN SATURATION: 97 % | RESPIRATION RATE: 17 BRPM | DIASTOLIC BLOOD PRESSURE: 67 MMHG

## 2020-11-19 DIAGNOSIS — K21.9 GASTROESOPHAGEAL REFLUX DISEASE WITHOUT ESOPHAGITIS: ICD-10-CM

## 2020-11-19 DIAGNOSIS — Z79.899 ENCOUNTER FOR MONITORING LONG-TERM PROTON PUMP INHIBITOR THERAPY: ICD-10-CM

## 2020-11-19 DIAGNOSIS — E11.65 TYPE 2 DIABETES MELLITUS WITH HYPERGLYCEMIA, WITH LONG-TERM CURRENT USE OF INSULIN (HCC): Primary | ICD-10-CM

## 2020-11-19 DIAGNOSIS — R79.89 ABNORMAL THYROID BLOOD TEST: ICD-10-CM

## 2020-11-19 DIAGNOSIS — Z72.0 TOBACCO ABUSE: ICD-10-CM

## 2020-11-19 DIAGNOSIS — Z11.59 NEED FOR HEPATITIS C SCREENING TEST: ICD-10-CM

## 2020-11-19 DIAGNOSIS — R63.4 ABNORMAL WEIGHT LOSS: ICD-10-CM

## 2020-11-19 DIAGNOSIS — Z71.6 TOBACCO ABUSE COUNSELING: ICD-10-CM

## 2020-11-19 DIAGNOSIS — B37.31 YEAST INFECTION INVOLVING THE VAGINA AND SURROUNDING AREA: ICD-10-CM

## 2020-11-19 DIAGNOSIS — E55.9 VITAMIN D DEFICIENCY: ICD-10-CM

## 2020-11-19 DIAGNOSIS — Z51.81 ENCOUNTER FOR MONITORING LONG-TERM PROTON PUMP INHIBITOR THERAPY: ICD-10-CM

## 2020-11-19 DIAGNOSIS — Z79.4 TYPE 2 DIABETES MELLITUS WITH HYPERGLYCEMIA, WITH LONG-TERM CURRENT USE OF INSULIN (HCC): Primary | ICD-10-CM

## 2020-11-19 DIAGNOSIS — L65.9 ALOPECIA: ICD-10-CM

## 2020-11-19 DIAGNOSIS — I15.2 HYPERTENSION ASSOCIATED WITH DIABETES (HCC): ICD-10-CM

## 2020-11-19 DIAGNOSIS — R22.32 MASS OF LEFT UPPER EXTREMITY: ICD-10-CM

## 2020-11-19 DIAGNOSIS — E11.59 HYPERTENSION ASSOCIATED WITH DIABETES (HCC): ICD-10-CM

## 2020-11-19 PROCEDURE — 99406 BEHAV CHNG SMOKING 3-10 MIN: CPT | Performed by: FAMILY MEDICINE

## 2020-11-19 PROCEDURE — 99214 OFFICE O/P EST MOD 30 MIN: CPT | Performed by: FAMILY MEDICINE

## 2020-11-19 RX ORDER — VARENICLINE TARTRATE 1 MG/1
1 TABLET, FILM COATED ORAL 2 TIMES DAILY
Qty: 56 TABLET | Refills: 4 | Status: SHIPPED | OUTPATIENT
Start: 2020-12-17 | End: 2021-05-06

## 2020-11-19 RX ORDER — INSULIN LISPRO 200 [IU]/ML
7 INJECTION, SOLUTION SUBCUTANEOUS
Qty: 12 ML | Refills: 3 | Status: SHIPPED | OUTPATIENT
Start: 2020-11-19 | End: 2020-11-25 | Stop reason: SDUPTHER

## 2020-11-19 RX ORDER — METFORMIN HYDROCHLORIDE 500 MG/1
1000 TABLET, EXTENDED RELEASE ORAL 2 TIMES DAILY
Qty: 360 TABLET | Refills: 3 | Status: SHIPPED | OUTPATIENT
Start: 2020-11-19 | End: 2022-01-05 | Stop reason: SDUPTHER

## 2020-11-19 RX ORDER — LISINOPRIL 5 MG/1
5 TABLET ORAL DAILY
Qty: 90 TABLET | Refills: 3 | Status: SHIPPED | OUTPATIENT
Start: 2020-11-19 | End: 2021-12-15 | Stop reason: SDUPTHER

## 2020-11-19 RX ORDER — PEN NEEDLE, DIABETIC 32GX 5/32"
1 NEEDLE, DISPOSABLE MISCELLANEOUS 4 TIMES DAILY
Qty: 100 EACH | Refills: 12 | Status: SHIPPED | OUTPATIENT
Start: 2020-11-19 | End: 2022-07-11 | Stop reason: SDUPTHER

## 2020-11-19 RX ORDER — FLUCONAZOLE 150 MG/1
TABLET ORAL
Qty: 2 TABLET | Refills: 11 | Status: SHIPPED | OUTPATIENT
Start: 2020-11-19 | End: 2021-06-09 | Stop reason: SDUPTHER

## 2020-11-19 NOTE — PATIENT INSTRUCTIONS
Diabetes Mellitus and Standards of Medical Care  Managing diabetes (diabetes mellitus) can be complicated. Your diabetes treatment may be managed by a team of health care providers, including:  · A physician who specializes in diabetes (endocrinologist).  · A nurse practitioner or physician assistant.  · Nurses.  · A diet and nutrition specialist (registered dietitian).  · A certified diabetes educator (CDE).  · An exercise specialist.  · A pharmacist.  · An eye doctor.  · A foot specialist (podiatrist).  · A dentist.  · A primary care provider.  · A mental health provider.     Your health care providers follow guidelines to help you get the best quality of care. The following schedule is a general guideline for your diabetes management plan. Your health care providers may give you more specific instructions.  Physical exams  Upon being diagnosed with diabetes mellitus, and each year after that, your health care provider will ask about your medical and family history. He or she will also do a physical exam. Your exam may include:  · Measuring your height, weight, and body mass index (BMI).  · Checking your blood pressure. This will be done at every routine medical visit. Your target blood pressure may vary depending on your medical conditions, your age, and other factors.  · Thyroid gland exam.  · Skin exam.  · Screening for damage to your nerves (peripheral neuropathy). This may include checking the pulse in your legs and feet and checking the level of sensation in your hands and feet.  · A complete foot exam to inspect the structure and skin of your feet, including checking for cuts, bruises, redness, blisters, sores, or other problems.  · Screening for blood vessel (vascular) problems, which may include checking the pulse in your legs and feet and checking your temperature.     Blood tests  Depending on your treatment plan and your personal needs, you may have the following tests done:  · HbA1c (hemoglobin A1c).  This test provides information about blood sugar (glucose) control over the previous 2-3 months. It is used to adjust your treatment plan, if needed. This test will be done:  ? At least 2 times a year, if you are meeting your treatment goals.  ? 4 times a year, if you are not meeting your treatment goals or if treatment goals have changed.  · Lipid testing, including total, LDL, and HDL cholesterol and triglyceride levels.  ? The goal for LDL is less than 100 mg/dL (5.5 mmol/L). If you are at high risk for complications, the goal is less than 70 mg/dL (3.9 mmol/L).  ? The goal for HDL is 40 mg/dL (2.2 mmol/L) or higher for men and 50 mg/dL(2.8 mmol/L) or higher for women. An HDL cholesterol of 60 mg/dL (3.3 mmol/L) or higher gives some protection against heart disease.  ? The goal for triglycerides is less than 150 mg/dL (8.3 mmol/L).  · Liver function tests.  · Kidney function tests.  · Thyroid function tests.     Dental and eye exams  · Visit your dentist two times a year.  · If you have type 1 diabetes, your health care provider may recommend an eye exam 3-5 years after you are diagnosed, and then once a year after your first exam.  ? For children with type 1 diabetes, a health care provider may recommend an eye exam when your child is age 10 or older and has had diabetes for 3-5 years. After the first exam, your child should get an eye exam once a year.  · If you have type 2 diabetes, your health care provider may recommend an eye exam as soon as you are diagnosed, and then once a year after your first exam.  Immunizations  · The yearly flu (influenza) vaccine is recommended for everyone 6 months or older who has diabetes.  · The pneumonia (pneumococcal) vaccine is recommended for everyone 2 years or older who has diabetes. If you are 65 or older, you may get the pneumonia vaccine as a series of two separate shots.  · The hepatitis B vaccine is recommended for adults shortly after being diagnosed with  diabetes.  ·   · Adults and children with diabetes should receive all other vaccines according to age-specific recommendations from the Centers for Disease Control and Prevention (CDC).  Mental and emotional health  Screening for symptoms of eating disorders, anxiety, and depression is recommended at the time of diagnosis and afterward as needed. If your screening shows that you have symptoms (positive screening result), you may need more evaluation and you may work with a mental health care provider.  Treatment plan  Your treatment plan will be reviewed at every medical visit. You and your health care provider will discuss:  · How you are taking your medicines, including insulin.  · Any side effects you are experiencing.  · Your blood glucose target goals.  · The frequency of your blood glucose monitoring.  · Lifestyle habits, such as activity level as well as tobacco, alcohol, and substance use.     Diabetes self-management education  Your health care provider will assess how well you are monitoring your blood glucose levels and whether you are taking your insulin correctly. He or she may refer you to:  · A certified diabetes educator to manage your diabetes throughout your life, starting at diagnosis.  · A registered dietitian who can create or review your personal nutrition plan.  · An exercise specialist who can discuss your activity level and exercise plan.     Summary  · Managing diabetes (diabetes mellitus) can be complicated. Your diabetes treatment may be managed by a team of health care providers.  · Your health care providers follow guidelines in order to help you get the best quality of care.  · Standards of care including having regular physical exams, blood tests, blood pressure monitoring, immunizations, screening tests, and education about how to manage your diabetes.  · Your health care providers may also give you more specific instructions based on your individual health.  This information is not  intended to replace advice given to you by your health care provider. Make sure you discuss any questions you have with your health care provider.  Document Released: 10/15/2010 Document Revised: 06/28/2018 Document Reviewed: 09/15/2017  [employer] Interactive Patient Education © 2019 [employer]

## 2020-11-19 NOTE — PROGRESS NOTES
"Subjective    Georgia Shameka Ricardo is a 50 y.o. female here for:  Chief Complaint   Patient presents with   • Diabetes     follow up   • Nicotine Dependence     discuss chantix   • Alopecia     referral to derm requested       History per MA reviewed.    Tobacco abuse  Started smoking age 13  Quit x 1 year with Chantix  Resumed smoking with stress and is up to 1 ppd  Wants to quit again, would like to try Chantix again, tolerated w/o side effects    Mass left arm  Present >1 year  Getting larger  Uncomfortable  No similar lesion on right arm    Diabetes mellitus  Trying to eat better  Struggling with eliminating Coke  Drinking a gallon of water a day, her goal  Has lost weight, she feels may be due to her diet changes    Hair loss  New  Months long  History of hypothyroidism, was on medicine in past  In areas she's bald  On biotin supplement     The following portions of the patient's history were reviewed and updated as appropriate: allergies, current medications, past family history, past medical history, past social history, past surgical history and problem list.    Review of Systems   Constitutional: Positive for activity change, appetite change, fatigue and unexpected weight gain. Negative for fever.   Genitourinary: Positive for vaginal discharge (itching, yeast infections).   Musculoskeletal: Positive for arthralgias.   Neurological: Positive for numbness (left thigh, longstanding).       Visit Vitals  /67   Pulse 83   Temp 97.7 °F (36.5 °C)   Resp 17   Ht 160 cm (62.99\")   Wt 96.2 kg (212 lb)   SpO2 97%   BMI 37.57 kg/m²         Objective   Physical Exam  Vitals signs and nursing note reviewed.   Constitutional:       General: She is not in acute distress.     Appearance: Normal appearance. She is well-developed and well-groomed. She is obese. She is not ill-appearing, toxic-appearing or diaphoretic.      Interventions: Face mask in place.   HENT:      Head: Normocephalic and atraumatic. Hair is " abnormal (very thin, areas of scalp visible).      Right Ear: Hearing normal.      Left Ear: Hearing normal.   Eyes:      General: Lids are normal. No scleral icterus.     Extraocular Movements: Extraocular movements intact.      Conjunctiva/sclera: Conjunctivae normal.      Pupils: Pupils are equal, round, and reactive to light.   Neck:      Musculoskeletal: Neck supple.      Trachea: Phonation normal.   Pulmonary:      Effort: Pulmonary effort is normal.   Musculoskeletal:      Left elbow: She exhibits deformity (palpable mass just superior to the medial epicodyle/antecubital fossa, not painful, no overlying skin changes).   Skin:     General: Skin is warm and dry.      Coloration: Skin is not ashen, cyanotic, jaundiced, mottled, pale or sallow.      Nails: There is no clubbing.     Neurological:      General: No focal deficit present.      Mental Status: She is alert and oriented to person, place, and time.      Cranial Nerves: No dysarthria.      Motor: Motor function is intact.      Gait: Gait normal.   Psychiatric:         Attention and Perception: Attention and perception normal.         Mood and Affect: Mood and affect normal.         Speech: Speech normal.         Behavior: Behavior normal. Behavior is cooperative.         Thought Content: Thought content normal.         Cognition and Memory: Cognition and memory normal.         Judgment: Judgment normal.           Assessment/Plan     Problem List Items Addressed This Visit        Cardiovascular and Mediastinum    Hypertension associated with diabetes (CMS/HCC)    Relevant Medications    Insulin Lispro (HumaLOG KwikPen) 200 UNIT/ML solution pen-injector    lisinopril (PRINIVIL,ZESTRIL) 5 MG tablet    metFORMIN ER (Glucophage XR) 500 MG 24 hr tablet    Other Relevant Orders    TSH+Free T4       Digestive    Gastroesophageal reflux disease without esophagitis    Relevant Orders    Comprehensive Metabolic Panel    Vitamin B12    Folate    Magnesium        Endocrine    Type 2 diabetes mellitus with hyperglycemia, with long-term current use of insulin (CMS/[facility]) - Primary    Relevant Medications    Insulin Lispro (HumaLOG KwikPen) 200 UNIT/ML solution pen-injector    Insulin Pen Needle (BD Pen Needle Oneyda U/F) 32G X 4 MM misc    lisinopril (PRINIVIL,ZESTRIL) 5 MG tablet    metFORMIN ER (Glucophage XR) 500 MG 24 hr tablet    Other Relevant Orders    Comprehensive Metabolic Panel    Lipid Panel    Hemoglobin A1c       Other    Tobacco abuse    Relevant Medications    varenicline (CHANTIX MARY) 0.5 MG X 11 & 1 MG X 42 tablet    varenicline (CHANTIX) 1 MG tablet (Start on 12/17/2020)      Other Visit Diagnoses     Alopecia        Relevant Orders    CBC & Differential    Comprehensive Metabolic Panel    TSH+Free T4    Ferritin    Iron Profile    Ambulatory Referral to Dermatology    Abnormal weight loss        Relevant Orders    CBC & Differential    Comprehensive Metabolic Panel    TSH+Free T4    Mass of left upper extremity        Relevant Orders    US Nonvascular Extremity Limited    Yeast infection involving the vagina and surrounding area        Relevant Medications    fluconazole (DIFLUCAN) 150 MG tablet    Other Relevant Orders    Hemoglobin A1c    Encounter for monitoring long-term proton pump inhibitor therapy        Relevant Orders    Vitamin B12    Folate    Magnesium    Abnormal thyroid blood test        Relevant Orders    CBC & Differential    TSH+Free T4    Vitamin D deficiency        Relevant Orders    Vitamin D 25 Hydroxy    Need for hepatitis C screening test        Relevant Orders    Hepatitis C antibody    Tobacco abuse counseling              Georgia Shameka Ricardo  reports that she has been smoking cigarettes. She has a 20.00 pack-year smoking history. She has never used smokeless tobacco.. I have educated her on the risk of diseases from using tobacco products such as cancer, COPD and heart disease.     I advised her to quit and she is willing to quit. We  have discussed the following method/s for tobacco cessation:  Counseling Prescription Medicaiton.  Together we have set a quit date for asap.  She will follow up with me in 3 months or sooner to check on her progress.    I spent 4 minutes counseling the patient.     discussed weight loss--maybe due to lifestyle changes but cannot rule out other causes such as uncontrolled diabetes mellitus    Hair loss may be due to iron deficiency and/or hypothyroidism. Hold biotin x 5 days as it interferes with thyroid labs.     Mass of elbow--differential diagnoses includes epitrochlear lymphadenopathy, synovial cyst, lipoma    Pt wishes to wait until next summer for mammogram, 2 year follow up  Due for colonoscopy, father had colon cancer. She was encouraged to schedule, specialist has already reached out regarding need to set up.    Return in about 4 months (around 3/19/2021).       Lovely Loyd MD

## 2020-11-24 ENCOUNTER — LAB (OUTPATIENT)
Dept: LAB | Facility: HOSPITAL | Age: 50
End: 2020-11-24

## 2020-11-24 LAB
BASOPHILS # BLD AUTO: 0.07 10*3/MM3 (ref 0–0.2)
BASOPHILS NFR BLD AUTO: 0.6 % (ref 0–1.5)
DEPRECATED RDW RBC AUTO: 39.4 FL (ref 37–54)
EOSINOPHIL # BLD AUTO: 0.11 10*3/MM3 (ref 0–0.4)
EOSINOPHIL NFR BLD AUTO: 1 % (ref 0.3–6.2)
ERYTHROCYTE [DISTWIDTH] IN BLOOD BY AUTOMATED COUNT: 12.8 % (ref 12.3–15.4)
HCT VFR BLD AUTO: 48.9 % (ref 34–46.6)
HGB BLD-MCNC: 16.6 G/DL (ref 12–15.9)
IMM GRANULOCYTES # BLD AUTO: 0.06 10*3/MM3 (ref 0–0.05)
IMM GRANULOCYTES NFR BLD AUTO: 0.5 % (ref 0–0.5)
LYMPHOCYTES # BLD AUTO: 3.79 10*3/MM3 (ref 0.7–3.1)
LYMPHOCYTES NFR BLD AUTO: 32.8 % (ref 19.6–45.3)
MCH RBC QN AUTO: 29 PG (ref 26.6–33)
MCHC RBC AUTO-ENTMCNC: 33.9 G/DL (ref 31.5–35.7)
MCV RBC AUTO: 85.3 FL (ref 79–97)
MONOCYTES # BLD AUTO: 0.8 10*3/MM3 (ref 0.1–0.9)
MONOCYTES NFR BLD AUTO: 6.9 % (ref 5–12)
NEUTROPHILS NFR BLD AUTO: 58.2 % (ref 42.7–76)
NEUTROPHILS NFR BLD AUTO: 6.72 10*3/MM3 (ref 1.7–7)
NRBC BLD AUTO-RTO: 0 /100 WBC (ref 0–0.2)
PLATELET # BLD AUTO: 228 10*3/MM3 (ref 140–450)
PMV BLD AUTO: 11 FL (ref 6–12)
RBC # BLD AUTO: 5.73 10*6/MM3 (ref 3.77–5.28)
WBC # BLD AUTO: 11.55 10*3/MM3 (ref 3.4–10.8)

## 2020-11-24 PROCEDURE — 86803 HEPATITIS C AB TEST: CPT | Performed by: FAMILY MEDICINE

## 2020-11-24 PROCEDURE — 84466 ASSAY OF TRANSFERRIN: CPT | Performed by: FAMILY MEDICINE

## 2020-11-24 PROCEDURE — 80053 COMPREHEN METABOLIC PANEL: CPT | Performed by: FAMILY MEDICINE

## 2020-11-24 PROCEDURE — 83036 HEMOGLOBIN GLYCOSYLATED A1C: CPT | Performed by: FAMILY MEDICINE

## 2020-11-24 PROCEDURE — 80061 LIPID PANEL: CPT | Performed by: FAMILY MEDICINE

## 2020-11-24 PROCEDURE — 82306 VITAMIN D 25 HYDROXY: CPT | Performed by: FAMILY MEDICINE

## 2020-11-24 PROCEDURE — 82746 ASSAY OF FOLIC ACID SERUM: CPT | Performed by: FAMILY MEDICINE

## 2020-11-24 PROCEDURE — 83540 ASSAY OF IRON: CPT | Performed by: FAMILY MEDICINE

## 2020-11-24 PROCEDURE — 36415 COLL VENOUS BLD VENIPUNCTURE: CPT | Performed by: FAMILY MEDICINE

## 2020-11-24 PROCEDURE — 84439 ASSAY OF FREE THYROXINE: CPT | Performed by: FAMILY MEDICINE

## 2020-11-24 PROCEDURE — 82607 VITAMIN B-12: CPT | Performed by: FAMILY MEDICINE

## 2020-11-24 PROCEDURE — 85025 COMPLETE CBC W/AUTO DIFF WBC: CPT | Performed by: FAMILY MEDICINE

## 2020-11-24 PROCEDURE — 82728 ASSAY OF FERRITIN: CPT | Performed by: FAMILY MEDICINE

## 2020-11-24 PROCEDURE — 83735 ASSAY OF MAGNESIUM: CPT | Performed by: FAMILY MEDICINE

## 2020-11-24 PROCEDURE — 84443 ASSAY THYROID STIM HORMONE: CPT | Performed by: FAMILY MEDICINE

## 2020-11-25 ENCOUNTER — APPOINTMENT (OUTPATIENT)
Dept: ULTRASOUND IMAGING | Facility: HOSPITAL | Age: 50
End: 2020-11-25

## 2020-11-25 ENCOUNTER — TELEPHONE (OUTPATIENT)
Dept: INTERNAL MEDICINE | Facility: CLINIC | Age: 50
End: 2020-11-25

## 2020-11-25 DIAGNOSIS — E11.65 TYPE 2 DIABETES MELLITUS WITH HYPERGLYCEMIA, WITH LONG-TERM CURRENT USE OF INSULIN (HCC): ICD-10-CM

## 2020-11-25 DIAGNOSIS — Z79.4 TYPE 2 DIABETES MELLITUS WITH HYPERGLYCEMIA, WITH LONG-TERM CURRENT USE OF INSULIN (HCC): ICD-10-CM

## 2020-11-25 DIAGNOSIS — IMO0002 INSULIN DEPENDENT TYPE 2 DIABETES MELLITUS, UNCONTROLLED: Primary | ICD-10-CM

## 2020-11-25 LAB
25(OH)D3 SERPL-MCNC: 26.7 NG/ML (ref 30–100)
ALBUMIN SERPL-MCNC: 3.8 G/DL (ref 3.5–5.2)
ALBUMIN/GLOB SERPL: 1.1 G/DL
ALP SERPL-CCNC: 118 U/L (ref 39–117)
ALT SERPL W P-5'-P-CCNC: 32 U/L (ref 1–33)
ANION GAP SERPL CALCULATED.3IONS-SCNC: 9.3 MMOL/L (ref 5–15)
AST SERPL-CCNC: 27 U/L (ref 1–32)
BILIRUB SERPL-MCNC: 0.4 MG/DL (ref 0–1.2)
BUN SERPL-MCNC: 13 MG/DL (ref 6–20)
BUN/CREAT SERPL: 17.3 (ref 7–25)
CALCIUM SPEC-SCNC: 9.5 MG/DL (ref 8.6–10.5)
CHLORIDE SERPL-SCNC: 98 MMOL/L (ref 98–107)
CHOLEST SERPL-MCNC: 172 MG/DL (ref 0–200)
CO2 SERPL-SCNC: 27.7 MMOL/L (ref 22–29)
CREAT SERPL-MCNC: 0.75 MG/DL (ref 0.57–1)
FERRITIN SERPL-MCNC: 363 NG/ML (ref 13–150)
FOLATE SERPL-MCNC: 5.97 NG/ML (ref 4.78–24.2)
GFR SERPL CREATININE-BSD FRML MDRD: 81 ML/MIN/1.73
GLOBULIN UR ELPH-MCNC: 3.6 GM/DL
GLUCOSE SERPL-MCNC: 417 MG/DL (ref 65–99)
HBA1C MFR BLD: 14.6 % (ref 4.8–5.6)
HCV AB SER DONR QL: NORMAL
HDLC SERPL-MCNC: 29 MG/DL (ref 40–60)
IRON 24H UR-MRATE: 60 MCG/DL (ref 37–145)
IRON SATN MFR SERPL: 18 % (ref 20–50)
LDLC SERPL CALC-MCNC: 78 MG/DL (ref 0–100)
LDLC/HDLC SERPL: 2.17 {RATIO}
MAGNESIUM SERPL-MCNC: 1.9 MG/DL (ref 1.6–2.6)
POTASSIUM SERPL-SCNC: 4.2 MMOL/L (ref 3.5–5.2)
PROT SERPL-MCNC: 7.4 G/DL (ref 6–8.5)
SODIUM SERPL-SCNC: 135 MMOL/L (ref 136–145)
T4 FREE SERPL-MCNC: 0.98 NG/DL (ref 0.93–1.7)
TIBC SERPL-MCNC: 338 MCG/DL (ref 298–536)
TRANSFERRIN SERPL-MCNC: 227 MG/DL (ref 200–360)
TRIGL SERPL-MCNC: 401 MG/DL (ref 0–150)
TSH SERPL DL<=0.05 MIU/L-ACNC: 4.04 UIU/ML (ref 0.27–4.2)
VIT B12 BLD-MCNC: 497 PG/ML (ref 211–946)
VLDLC SERPL-MCNC: 65 MG/DL (ref 5–40)

## 2020-11-25 RX ORDER — INSULIN GLARGINE 300 U/ML
55 INJECTION, SOLUTION SUBCUTANEOUS NIGHTLY
Qty: 4 PEN | Refills: 5 | Status: SHIPPED | OUTPATIENT
Start: 2020-11-25 | End: 2021-04-15

## 2020-11-25 RX ORDER — INSULIN LISPRO 200 [IU]/ML
10 INJECTION, SOLUTION SUBCUTANEOUS
Qty: 2 PEN | Refills: 5 | Status: SHIPPED | OUTPATIENT
Start: 2020-11-25 | End: 2021-04-15

## 2020-11-25 NOTE — TELEPHONE ENCOUNTER
Received text to call lab at 0157 regarding critical glucose on my patient, known uncontrolled diabetic, glucose 417.     Call to patient deferred to AM given time of morning and lab was drawn nearly 12 hours earlier.

## 2020-11-25 NOTE — PROGRESS NOTES
Please call patient and let her know labs are released on my chart. Diabetes mellitus is out of control, which explains her weight loss. Sugar yesterday was 417. A1C is 14.6. She needs to see endocrinology. I'll put in referral and message her regarding insulin instructions.

## 2020-12-03 ENCOUNTER — HOSPITAL ENCOUNTER (EMERGENCY)
Facility: HOSPITAL | Age: 50
Discharge: HOME | End: 2020-12-03
Attending: EMERGENCY MEDICINE | Admitting: EMERGENCY MEDICINE

## 2020-12-03 ENCOUNTER — APPOINTMENT (OUTPATIENT)
Dept: CT IMAGING | Facility: HOSPITAL | Age: 50
End: 2020-12-03

## 2020-12-03 VITALS
OXYGEN SATURATION: 96 % | DIASTOLIC BLOOD PRESSURE: 78 MMHG | SYSTOLIC BLOOD PRESSURE: 128 MMHG | TEMPERATURE: 98.6 F | HEIGHT: 64 IN | RESPIRATION RATE: 16 BRPM | HEART RATE: 64 BPM | WEIGHT: 207 LBS | BODY MASS INDEX: 35.34 KG/M2

## 2020-12-03 DIAGNOSIS — K76.0 FATTY LIVER: ICD-10-CM

## 2020-12-03 DIAGNOSIS — R10.10 UPPER ABDOMINAL PAIN: Primary | ICD-10-CM

## 2020-12-03 LAB
ALBUMIN SERPL-MCNC: 3.6 G/DL (ref 3.5–5.2)
ALBUMIN/GLOB SERPL: 1.1 G/DL
ALP SERPL-CCNC: 108 U/L (ref 39–117)
ALT SERPL W P-5'-P-CCNC: 27 U/L (ref 1–33)
ANION GAP SERPL CALCULATED.3IONS-SCNC: 10.5 MMOL/L (ref 5–15)
AST SERPL-CCNC: 23 U/L (ref 1–32)
BASOPHILS # BLD AUTO: 0.07 10*3/MM3 (ref 0–0.2)
BASOPHILS NFR BLD AUTO: 0.6 % (ref 0–1.5)
BILIRUB SERPL-MCNC: 0.4 MG/DL (ref 0–1.2)
BUN SERPL-MCNC: 10 MG/DL (ref 6–20)
BUN/CREAT SERPL: 14.9 (ref 7–25)
CALCIUM SPEC-SCNC: 9.4 MG/DL (ref 8.6–10.5)
CHLORIDE SERPL-SCNC: 100 MMOL/L (ref 98–107)
CO2 SERPL-SCNC: 25.5 MMOL/L (ref 22–29)
CREAT SERPL-MCNC: 0.67 MG/DL (ref 0.57–1)
DEPRECATED RDW RBC AUTO: 41.1 FL (ref 37–54)
EOSINOPHIL # BLD AUTO: 0.1 10*3/MM3 (ref 0–0.4)
EOSINOPHIL NFR BLD AUTO: 0.9 % (ref 0.3–6.2)
ERYTHROCYTE [DISTWIDTH] IN BLOOD BY AUTOMATED COUNT: 13.2 % (ref 12.3–15.4)
GFR SERPL CREATININE-BSD FRML MDRD: 92 ML/MIN/1.73
GLOBULIN UR ELPH-MCNC: 3.4 GM/DL
GLUCOSE BLDC GLUCOMTR-MCNC: 225 MG/DL (ref 70–130)
GLUCOSE SERPL-MCNC: 232 MG/DL (ref 65–99)
HCT VFR BLD AUTO: 49.1 % (ref 34–46.6)
HGB BLD-MCNC: 16.6 G/DL (ref 12–15.9)
IMM GRANULOCYTES # BLD AUTO: 0.06 10*3/MM3 (ref 0–0.05)
IMM GRANULOCYTES NFR BLD AUTO: 0.5 % (ref 0–0.5)
LIPASE SERPL-CCNC: 42 U/L (ref 13–60)
LYMPHOCYTES # BLD AUTO: 3.56 10*3/MM3 (ref 0.7–3.1)
LYMPHOCYTES NFR BLD AUTO: 31.4 % (ref 19.6–45.3)
MCH RBC QN AUTO: 28.7 PG (ref 26.6–33)
MCHC RBC AUTO-ENTMCNC: 33.8 G/DL (ref 31.5–35.7)
MCV RBC AUTO: 84.9 FL (ref 79–97)
MONOCYTES # BLD AUTO: 0.74 10*3/MM3 (ref 0.1–0.9)
MONOCYTES NFR BLD AUTO: 6.5 % (ref 5–12)
NEUTROPHILS NFR BLD AUTO: 6.82 10*3/MM3 (ref 1.7–7)
NEUTROPHILS NFR BLD AUTO: 60.1 % (ref 42.7–76)
NRBC BLD AUTO-RTO: 0 /100 WBC (ref 0–0.2)
PLATELET # BLD AUTO: 217 10*3/MM3 (ref 140–450)
PMV BLD AUTO: 9.7 FL (ref 6–12)
POTASSIUM SERPL-SCNC: 3.9 MMOL/L (ref 3.5–5.2)
PROT SERPL-MCNC: 7 G/DL (ref 6–8.5)
RBC # BLD AUTO: 5.78 10*6/MM3 (ref 3.77–5.28)
SODIUM SERPL-SCNC: 136 MMOL/L (ref 136–145)
TROPONIN T SERPL-MCNC: <0.01 NG/ML (ref 0–0.03)
WBC # BLD AUTO: 11.35 10*3/MM3 (ref 3.4–10.8)

## 2020-12-03 PROCEDURE — 80053 COMPREHEN METABOLIC PANEL: CPT | Performed by: EMERGENCY MEDICINE

## 2020-12-03 PROCEDURE — 93005 ELECTROCARDIOGRAM TRACING: CPT | Performed by: EMERGENCY MEDICINE

## 2020-12-03 PROCEDURE — 25010000002 DROPERIDOL PER 5 MG: Performed by: EMERGENCY MEDICINE

## 2020-12-03 PROCEDURE — 85025 COMPLETE CBC W/AUTO DIFF WBC: CPT | Performed by: EMERGENCY MEDICINE

## 2020-12-03 PROCEDURE — 99283 EMERGENCY DEPT VISIT LOW MDM: CPT

## 2020-12-03 PROCEDURE — 96374 THER/PROPH/DIAG INJ IV PUSH: CPT

## 2020-12-03 PROCEDURE — 83690 ASSAY OF LIPASE: CPT | Performed by: EMERGENCY MEDICINE

## 2020-12-03 PROCEDURE — 25010000002 ONDANSETRON PER 1 MG: Performed by: EMERGENCY MEDICINE

## 2020-12-03 PROCEDURE — 25010000002 IOPAMIDOL 61 % SOLUTION: Performed by: EMERGENCY MEDICINE

## 2020-12-03 PROCEDURE — 84484 ASSAY OF TROPONIN QUANT: CPT | Performed by: EMERGENCY MEDICINE

## 2020-12-03 PROCEDURE — 96375 TX/PRO/DX INJ NEW DRUG ADDON: CPT

## 2020-12-03 PROCEDURE — 74177 CT ABD & PELVIS W/CONTRAST: CPT

## 2020-12-03 PROCEDURE — 82962 GLUCOSE BLOOD TEST: CPT

## 2020-12-03 PROCEDURE — 25010000002 MORPHINE PER 10 MG: Performed by: EMERGENCY MEDICINE

## 2020-12-03 RX ORDER — MORPHINE SULFATE 4 MG/ML
4 INJECTION, SOLUTION INTRAMUSCULAR; INTRAVENOUS ONCE
Status: COMPLETED | OUTPATIENT
Start: 2020-12-03 | End: 2020-12-03

## 2020-12-03 RX ORDER — ONDANSETRON 2 MG/ML
4 INJECTION INTRAMUSCULAR; INTRAVENOUS ONCE
Status: COMPLETED | OUTPATIENT
Start: 2020-12-03 | End: 2020-12-03

## 2020-12-03 RX ORDER — DROPERIDOL 2.5 MG/ML
1.25 INJECTION, SOLUTION INTRAMUSCULAR; INTRAVENOUS ONCE
Status: COMPLETED | OUTPATIENT
Start: 2020-12-03 | End: 2020-12-03

## 2020-12-03 RX ADMIN — SODIUM CHLORIDE 1000 ML: 9 INJECTION, SOLUTION INTRAVENOUS at 15:47

## 2020-12-03 RX ADMIN — IOPAMIDOL 100 ML: 612 INJECTION, SOLUTION INTRAVENOUS at 16:29

## 2020-12-03 RX ADMIN — ONDANSETRON 4 MG: 2 INJECTION INTRAMUSCULAR; INTRAVENOUS at 15:48

## 2020-12-03 RX ADMIN — DROPERIDOL 1.25 MG: 2.5 INJECTION, SOLUTION INTRAMUSCULAR; INTRAVENOUS at 17:15

## 2020-12-03 RX ADMIN — MORPHINE SULFATE 4 MG: 4 INJECTION, SOLUTION INTRAMUSCULAR; INTRAVENOUS at 15:49

## 2020-12-03 NOTE — ED PROVIDER NOTES
Subjective   50-year-old female present with abdominal pain.  She states that for the last week she has had intermittent left upper quadrant, epigastric pain, this radiates across her abdomen into her back.  Worse when she eats.  No alleviating factors.  She has had some nausea.  She denies any fevers, chills, vomiting, diarrhea, urinary complaints, chest pain, shortness of breath.  She has had a cholecystectomy in the past.  She does note a history of pancreatitis as well and thinks this may feel somewhat similar.  She does not drink alcohol.  She is unsure why she had pancreatitis in the past.          Review of Systems   Constitutional: Negative.    HENT: Negative.    Eyes: Negative.    Respiratory: Negative.    Cardiovascular: Negative.    Gastrointestinal: Positive for abdominal pain and nausea. Negative for diarrhea and vomiting.   Genitourinary: Negative.    Musculoskeletal: Negative.    Skin: Negative.    Neurological: Negative.    Psychiatric/Behavioral: Negative.        Past Medical History:   Diagnosis Date   • Acid reflux    • Arthritis    • Asthma    • Chronic bronchitis (CMS/HCC)    • Colon polyp 2015   • Diabetes (CMS/HCC)    • Diverticulitis    • Diverticulosis    • Fracture     lateral left ankle   • Gallstones    • Gout    • Headache    • Hypertension    • Ileitis 2019   • Low back pain    • Neuromuscular disorder (CMS/HCC)    • Thyroid disease    • Visual impairment        Allergies   Allergen Reactions   • Latex Swelling     Happened once       Past Surgical History:   Procedure Laterality Date   •  SECTION     • CHOLECYSTECTOMY     • COLONOSCOPY  ,    DR LYLES,DR MARISCAL   • DILATATION AND CURETTAGE     • ENDOSCOPY      DR LOIDA ROBERTS   • FOOT SURGERY     • HYSTERECTOMY     • UPPER GASTROINTESTINAL ENDOSCOPY  2015       Family History   Problem Relation Age of Onset   • Arthritis Mother    • Mental illness Mother    • Migraines Mother    •  Osteoporosis Mother    • Thyroid disease Mother    • Depression Mother    • Heart disease Mother    • Colon cancer Father         passed away at age 53   • Cancer Father    • Thyroid disease Sister        Social History     Socioeconomic History   • Marital status:      Spouse name: Not on file   • Number of children: Not on file   • Years of education: Not on file   • Highest education level: Not on file   Tobacco Use   • Smoking status: Current Every Day Smoker     Packs/day: 1.00     Years: 20.00     Pack years: 20.00     Types: Cigarettes   • Smokeless tobacco: Never Used   Substance and Sexual Activity   • Alcohol use: No     Frequency: Never   • Drug use: No   • Sexual activity: Yes     Partners: Male     Birth control/protection: Surgical           Objective   Physical Exam  Constitutional:       General: She is not in acute distress.     Appearance: Normal appearance. She is not ill-appearing, toxic-appearing or diaphoretic.   HENT:      Head: Normocephalic and atraumatic.      Right Ear: External ear normal.      Left Ear: External ear normal.      Nose: Nose normal.      Mouth/Throat:      Mouth: Mucous membranes are moist.      Pharynx: Oropharynx is clear.   Eyes:      Extraocular Movements: Extraocular movements intact.      Conjunctiva/sclera: Conjunctivae normal.      Pupils: Pupils are equal, round, and reactive to light.   Neck:      Musculoskeletal: Normal range of motion.   Cardiovascular:      Rate and Rhythm: Normal rate and regular rhythm.      Pulses: Normal pulses.      Heart sounds: Normal heart sounds.   Pulmonary:      Effort: Pulmonary effort is normal. No respiratory distress.      Breath sounds: Normal breath sounds.   Abdominal:      General: Bowel sounds are normal. There is no distension.      Comments: Diffuse tenderness worse in the right lower quadrant and left upper quadrant   Musculoskeletal: Normal range of motion.         General: No swelling, tenderness or deformity.    Skin:     General: Skin is warm and dry.      Capillary Refill: Capillary refill takes less than 2 seconds.      Findings: No rash.   Neurological:      General: No focal deficit present.      Mental Status: She is alert and oriented to person, place, and time.   Psychiatric:         Mood and Affect: Mood normal.         Behavior: Behavior normal.         Procedures           ED Course                                           MDM  Number of Diagnoses or Management Options  Fatty liver:   Upper abdominal pain:   Diagnosis management comments: 50-year-old female with abdominal pain.  Well-developed, well-nourished lady in no distress with exam as above.  She does have abdominal tenderness, mostly in the right lower quadrant and left upper quadrant.  Will obtain labs, urinalysis, CT scan.  Will give symptomatic treatment.  Disposition pending.    DDx: Pancreatitis, ulcer disease, appendicitis, obstruction    EKG interpreted by me: Sinus rhythm, normal rate, no acute ST/T changes, septal Q waves, no acute ST/T changes, this is an abnormal EKG    Lab work reveals no acute or significant abnormalities compared to previous.  CT scan reveals findings consistent with a fatty liver.  She is resting comfortably, feels well.  I do feel she is safe for discharge home.  Encourage close outpatient follow-up.  Patient and  are comfortable with and understanding of the plan.       Amount and/or Complexity of Data Reviewed  Clinical lab tests: reviewed  Tests in the radiology section of CPT®: reviewed  Decide to obtain previous medical records or to obtain history from someone other than the patient: yes        Final diagnoses:   Upper abdominal pain   Fatty liver            Aristides Wren MD  12/03/20 [id number]

## 2020-12-03 NOTE — ED NOTES
"Pt called out, stated that she was feeling nauseated again and that the Morphine \"really didn't help\". Dr. Wren notified. Droperidol ordered and given.      Sahra Barboza RN  12/03/20 [id number]    "

## 2020-12-04 ENCOUNTER — APPOINTMENT (OUTPATIENT)
Dept: ULTRASOUND IMAGING | Facility: HOSPITAL | Age: 50
End: 2020-12-04

## 2020-12-16 ENCOUNTER — TELEPHONE (OUTPATIENT)
Dept: INTERNAL MEDICINE | Facility: CLINIC | Age: 50
End: 2020-12-16

## 2020-12-16 NOTE — TELEPHONE ENCOUNTER
Patient states she has been having issues with her vision. She states she is having difficulty seeing even with her glasses. She states things are more blurry than usual.

## 2020-12-16 NOTE — TELEPHONE ENCOUNTER
1. Needs to see eye doctor for exam  2. Keep log of sugars fasting and 2 hours after meals. Bring log to appointment. It's okay to see extender for this as they're communicating with me about things (my family sees [facility], if that makes her feel any more comfortable). Where I'm out of office starting next week I don't have much open. See extender this time, to work on adjusting insulin, and see me the next time. That's the safer thing than waiting for me.

## 2020-12-16 NOTE — TELEPHONE ENCOUNTER
Patient states that her vision has been blurry for 1 week, states that her sugars have been running high. I offered a appointment with extenders and she stated she would rather see Dr. Loyd and the next opening is not until Friday at 1245 and she works until 130. Please advise?

## 2020-12-18 ENCOUNTER — OFFICE VISIT (OUTPATIENT)
Dept: INTERNAL MEDICINE | Facility: CLINIC | Age: 50
End: 2020-12-18

## 2020-12-18 VITALS
HEIGHT: 64 IN | TEMPERATURE: 97.5 F | HEART RATE: 80 BPM | SYSTOLIC BLOOD PRESSURE: 126 MMHG | DIASTOLIC BLOOD PRESSURE: 86 MMHG | BODY MASS INDEX: 36.54 KG/M2 | OXYGEN SATURATION: 97 % | WEIGHT: 214 LBS

## 2020-12-18 DIAGNOSIS — Z79.4 TYPE 2 DIABETES MELLITUS WITH HYPERGLYCEMIA, WITH LONG-TERM CURRENT USE OF INSULIN (HCC): ICD-10-CM

## 2020-12-18 DIAGNOSIS — E11.65 TYPE 2 DIABETES MELLITUS WITH HYPERGLYCEMIA, WITH LONG-TERM CURRENT USE OF INSULIN (HCC): ICD-10-CM

## 2020-12-18 DIAGNOSIS — H53.9 VISION DISTURBANCE: Primary | ICD-10-CM

## 2020-12-18 DIAGNOSIS — J01.40 ACUTE PANSINUSITIS, RECURRENCE NOT SPECIFIED: ICD-10-CM

## 2020-12-18 DIAGNOSIS — L40.9 PSORIASIS: ICD-10-CM

## 2020-12-18 PROCEDURE — 99214 OFFICE O/P EST MOD 30 MIN: CPT | Performed by: PHYSICIAN ASSISTANT

## 2020-12-18 RX ORDER — AMOXICILLIN AND CLAVULANATE POTASSIUM 875; 125 MG/1; MG/1
1 TABLET, FILM COATED ORAL 2 TIMES DAILY
Qty: 20 TABLET | Refills: 0 | Status: SHIPPED | OUTPATIENT
Start: 2020-12-18 | End: 2020-12-28

## 2020-12-18 RX ORDER — FLUTICASONE PROPIONATE 50 MCG
2 SPRAY, SUSPENSION (ML) NASAL DAILY
Qty: 1 BOTTLE | Refills: 5 | Status: SHIPPED | OUTPATIENT
Start: 2020-12-18

## 2020-12-18 RX ORDER — FLUOCINONIDE 0.5 MG/G
OINTMENT TOPICAL 2 TIMES DAILY
Qty: 120 G | Refills: 1 | Status: SHIPPED | OUTPATIENT
Start: 2020-12-18

## 2020-12-18 NOTE — PROGRESS NOTES
Karli Ricardo is a 50 y.o. female.     Subjective   History of Present Illness   Here today with concern of blurred vision for the last week or so.  She has noticed trouble with close vision which seems to be worse in the left eye. She has also had some tingling in the left side of her head and frontal headaches which are abnormal for her. She has had rhinorrhea, postnasal drip and little sinus pressure worsening for several weeks. Using a neti pot helps some but makes her ears hurt.  She denies any numbness, weakness, facial drooping, confusion, speech disturbance, chest pain or SOA.  She thinks it has been 1 year or more since her last eye exam.  She is known to have very poorly controlled diabetes with A1c of 14.6 less than 1 month ago.  2 weeks ago Dr. Loyd adjusted her insulin regimen and she reports that glucose has improved from 500s to mostly low 200s since then.  She is now administering 25 units of Humalog tid with meals and 50 units of Toujeo nightly.        The following portions of the patient's history were reviewed and updated as appropriate: allergies, current medications, past family history, past medical history, past social history, past surgical history and problem list.    Review of Systems   Constitutional: Negative for activity change, chills, fatigue and fever.   HENT: Positive for congestion, ear pain, postnasal drip, rhinorrhea and sinus pressure. Negative for dental problem, ear discharge, facial swelling, mouth sores, sore throat, tinnitus and voice change.    Eyes: Positive for blurred vision and visual disturbance. Negative for photophobia, pain, discharge and itching.   Respiratory: Negative for cough, chest tightness, shortness of breath and wheezing.    Cardiovascular: Negative for chest pain, palpitations and leg swelling.   Gastrointestinal: Negative for abdominal pain, constipation, diarrhea, nausea and vomiting.   Allergic/Immunologic: Negative for immunocompromised  state.   Neurological: Positive for headache. Negative for dizziness, speech difficulty, weakness and confusion.        Tingling left side of head.   Hematological: Negative for adenopathy. Does not bruise/bleed easily.   Psychiatric/Behavioral: Negative for agitation, sleep disturbance and depressed mood. The patient is not nervous/anxious.          Objective    Physical Exam  Vitals signs and nursing note reviewed.   Constitutional:       General: She is not in acute distress.     Appearance: She is well-developed. She is obese. She is not ill-appearing, toxic-appearing or diaphoretic.   HENT:      Head: Normocephalic and atraumatic.      Right Ear: Ear canal and external ear normal. There is no impacted cerumen.      Left Ear: Ear canal and external ear normal. There is no impacted cerumen.      Ears:      Comments: Mild left TM effusion     Nose: Congestion present.      Comments: Left nasal turbinate erythema and edema. Congestion present in left nares.      Mouth/Throat:      Mouth: Mucous membranes are moist.      Pharynx: No posterior oropharyngeal erythema.   Eyes:      General: No visual field deficit or scleral icterus.     Extraocular Movements: Extraocular movements intact.      Conjunctiva/sclera: Conjunctivae normal.      Pupils: Pupils are equal, round, and reactive to light.   Neck:      Musculoskeletal: Normal range of motion and neck supple. No neck rigidity or muscular tenderness.      Vascular: No carotid bruit.   Cardiovascular:      Rate and Rhythm: Normal rate and regular rhythm.      Heart sounds: Normal heart sounds. No murmur. No friction rub. No gallop.    Pulmonary:      Effort: Pulmonary effort is normal. No respiratory distress.      Breath sounds: Normal breath sounds. No wheezing, rhonchi or rales.   Chest:      Chest wall: No tenderness.   Abdominal:      General: Bowel sounds are normal.      Palpations: Abdomen is soft.      Tenderness: There is no abdominal tenderness. There is  "no right CVA tenderness, left CVA tenderness, guarding or rebound.   Musculoskeletal: Normal range of motion.         General: No tenderness or deformity.      Right lower leg: No edema.      Left lower leg: No edema.   Lymphadenopathy:      Cervical: No cervical adenopathy.   Skin:     General: Skin is warm and dry.      Capillary Refill: Capillary refill takes less than 2 seconds.      Coloration: Skin is not jaundiced.      Findings: No bruising, lesion or rash.   Neurological:      General: No focal deficit present.      Mental Status: She is alert and oriented to person, place, and time.      Cranial Nerves: Cranial nerves are intact. No cranial nerve deficit, dysarthria or facial asymmetry.      Sensory: Sensation is intact. No sensory deficit.      Motor: Motor function is intact. No weakness, tremor, atrophy, abnormal muscle tone, seizure activity or pronator drift.      Coordination: Coordination is intact. Romberg sign negative. Coordination normal. Finger-Nose-Finger Test normal. Rapid alternating movements normal.      Gait: Gait and tandem walk normal.      Deep Tendon Reflexes: Reflexes normal.   Psychiatric:         Mood and Affect: Mood normal.         Behavior: Behavior normal.         Thought Content: Thought content normal.         Judgment: Judgment normal.           /86   Pulse 80   Temp 97.5 °F (36.4 °C)   Ht 162.6 cm (64.02\")   Wt 97.1 kg (214 lb)   SpO2 97%   BMI 36.71 kg/m²     Nursing note and vitals reviewed.          Assessment/Plan   Diagnoses and all orders for this visit:    1. Vision disturbance (Primary)  Visual disturbances seem most likely due to acute sinusitis.  Will initiate Augmentin and Flonase.  If symptoms worsen or persist imaging will be considered.  She was also encouraged to schedule a diabetic eye exam as it is overdue.    2. Acute pansinusitis, recurrence not specified  -     amoxicillin-clavulanate (Augmentin) 875-125 MG per tablet; Take 1 tablet by mouth " 2 (Two) Times a Day for 10 days.  Dispense: 20 tablet; Refill: 0  -     fluticasone (Flonase) 50 MCG/ACT nasal spray; 2 sprays into the nostril(s) as directed by provider Daily.  Dispense: 1 bottle; Refill: 5    3. Type 2 diabetes mellitus with hyperglycemia, with long-term current use of insulin (CMS/[facility])  She was advised to further increase Toujeo to 54 units nightly as glucose remains far from goal.  Follow diabetic diet. Take all medications as prescribed.  Exercise as tolerated up to 30 minutes 5 days a week.  Monitor blood sugars as discussed. See eye doctor annually.  Wear protective foot wear/no bare feet. Check feet regularly for calluses or ulcers.  Discussed risk of poorly controlled diabetes and long-term complications.        ER with any acutely worsened symptoms.         No follow-ups on file.      SOLOMON Davis  12/18/2020  15:10 EST

## 2021-01-27 ENCOUNTER — OFFICE VISIT (OUTPATIENT)
Dept: INTERNAL MEDICINE | Facility: CLINIC | Age: 51
End: 2021-01-27

## 2021-01-27 VITALS
RESPIRATION RATE: 18 BRPM | SYSTOLIC BLOOD PRESSURE: 120 MMHG | TEMPERATURE: 97.5 F | HEART RATE: 85 BPM | HEIGHT: 64 IN | DIASTOLIC BLOOD PRESSURE: 65 MMHG | BODY MASS INDEX: 37.26 KG/M2 | WEIGHT: 218.25 LBS | OXYGEN SATURATION: 95 %

## 2021-01-27 DIAGNOSIS — R20.2 PARESTHESIA OF BILATERAL LEGS: Primary | ICD-10-CM

## 2021-01-27 DIAGNOSIS — R25.3 MUSCLE TWITCH: ICD-10-CM

## 2021-01-27 DIAGNOSIS — K44.9 HIATAL HERNIA: ICD-10-CM

## 2021-01-27 DIAGNOSIS — E66.01 CLASS 2 SEVERE OBESITY DUE TO EXCESS CALORIES WITH SERIOUS COMORBIDITY AND BODY MASS INDEX (BMI) OF 37.0 TO 37.9 IN ADULT (HCC): ICD-10-CM

## 2021-01-27 DIAGNOSIS — R20.8 ELECTRICAL SHOCK SENSATION: ICD-10-CM

## 2021-01-27 DIAGNOSIS — H53.9 VISION DISTURBANCE: ICD-10-CM

## 2021-01-27 DIAGNOSIS — E53.8 LOW FOLATE: ICD-10-CM

## 2021-01-27 PROBLEM — M54.50 LUMBAR BACK PAIN: Status: ACTIVE | Noted: 2021-01-27

## 2021-01-27 PROCEDURE — 99214 OFFICE O/P EST MOD 30 MIN: CPT | Performed by: FAMILY MEDICINE

## 2021-01-27 RX ORDER — PANTOPRAZOLE SODIUM 40 MG/1
40 TABLET, DELAYED RELEASE ORAL DAILY PRN
Qty: 90 TABLET | Refills: 3 | Status: SHIPPED | OUTPATIENT
Start: 2021-01-27

## 2021-01-27 RX ORDER — TRIAMCINOLONE ACETONIDE 1 MG/G
CREAM TOPICAL 2 TIMES DAILY PRN
COMMUNITY
Start: 2020-12-30 | End: 2022-12-26

## 2021-01-27 RX ORDER — PREGABALIN 75 MG/1
75 CAPSULE ORAL 3 TIMES DAILY PRN
Qty: 90 CAPSULE | Refills: 1 | Status: SHIPPED | OUTPATIENT
Start: 2021-01-27 | End: 2021-02-19

## 2021-01-27 RX ORDER — FOLIC ACID 1 MG/1
1 TABLET ORAL DAILY
Qty: 90 TABLET | Refills: 3 | Status: SHIPPED | OUTPATIENT
Start: 2021-01-27 | End: 2021-03-19

## 2021-01-27 NOTE — PROGRESS NOTES
"Subjective    [place] Shameka Ricardo is a 50 y.o. female here for:  Chief Complaint   Patient presents with   • Leg Pain     \"legs feel like they are on fire\". Going on for a while, but worse now. Gabapentin is not helping. Denies any new falls or injuries. Pain has now spread to the lower abdomen and top of her mons pubis.        History per MA reviewed.    Numbness/tingling/burning up legs to lower torso, including mons pubis area  No numbness to genitalia  Chronic back pain  Legs feel weak, feels like losing muscle in legs and arms  Muscle twitch  Sometimes has a lightning sensation   Has blurry vision and at times almost loses vision  Not up to date eye exam, will schedule  Sugars improving she says, last check 160  Endo appointment got moved out from march to April   Gabapentin sedating at first, now not as much. Not helping pain.          The following portions of the patient's history were reviewed and updated as appropriate: allergies, current medications, past family history, past medical history, past social history, past surgical history and problem list.    Review of Systems   Constitutional: Positive for fatigue.   Eyes: Positive for blurred vision and visual disturbance.   Neurological: Positive for weakness and numbness.       Visit Vitals  /65   Pulse 85   Temp 97.5 °F (36.4 °C) (Temporal)   Resp 18   Ht 162.6 cm (64.02\")   Wt 99 kg (218 lb 4 oz)   SpO2 95%   BMI 37.44 kg/m²         Objective   Physical Exam  Vitals signs and nursing note reviewed.   Constitutional:       General: She is not in acute distress.     Appearance: Normal appearance. She is well-developed and well-groomed. She is obese. She is not ill-appearing, toxic-appearing or diaphoretic.      Interventions: Face mask in place.   HENT:      Head: Normocephalic and atraumatic.      Right Ear: Hearing normal.      Left Ear: Hearing normal.   Eyes:      General: Lids are normal. No scleral icterus.     Extraocular Movements: " Extraocular movements intact.   Neck:      Trachea: Phonation normal.   Pulmonary:      Effort: Pulmonary effort is normal.   Skin:     Coloration: Skin is not jaundiced.   Neurological:      General: No focal deficit present.      Mental Status: She is alert and oriented to person, place, and time.      Motor: Motor function is intact.   Psychiatric:         Attention and Perception: Attention and perception normal.         Mood and Affect: Mood and affect normal.         Speech: Speech normal.         Behavior: Behavior normal. Behavior is cooperative.         Thought Content: Thought content normal.         Cognition and Memory: Cognition and memory normal.         Judgment: Judgment normal.         For medical decision making review of the following was required:  Lab Results   Component Value Date    HGBA1C 14.60 (H) 11/24/2020    HGBA1C 11.4 01/06/2020    HGBA1C 13.1 10/01/2019     Lab Results   Component Value Date    PRXSPTQU19 497 11/24/2020     Lab Results   Component Value Date    FOLATE 5.97 11/24/2020     MRI Brain Without Contrast (10/21/2016 09:09)      Assessment/Plan     Problem List Items Addressed This Visit     None      Visit Diagnoses     Paresthesia of bilateral legs    -  Primary    Relevant Medications    pregabalin (Lyrica) 75 MG capsule    Other Relevant Orders    Ambulatory Referral to Neurology    Muscle twitch        Relevant Orders    Ambulatory Referral to Neurology    Electrical shock sensation        Relevant Orders    Ambulatory Referral to Neurology    Hiatal hernia        Relevant Medications    pantoprazole (PROTONIX) 40 MG EC tablet    Low folate        Relevant Medications    folic acid (FOLVITE) 1 MG tablet    Vision disturbance        Relevant Orders    Ambulatory Referral to Neurology    Class 2 severe obesity due to excess calories with serious comorbidity and body mass index (BMI) of 37.0 to 37.9 in adult (CMS/[facility])                · Patient's Body mass index is 37.44  kg/m². BMI is above normal parameters. Recommendations include: exercise counseling and nutrition counseling.  · See endocrinology as scheduled, I'll see back for diabetes mellitus one more time before that visit.  · Encouraged eye exam  · May need mri lumbar spine but with her other possible neurological issues I feel neuro consult more appropriate at this time. May need emg/ncv.    Lovely Loyd MD

## 2021-02-03 ENCOUNTER — TELEPHONE (OUTPATIENT)
Dept: INTERNAL MEDICINE | Facility: CLINIC | Age: 51
End: 2021-02-03

## 2021-02-03 DIAGNOSIS — R25.2 MUSCLE CRAMPING: Primary | ICD-10-CM

## 2021-02-03 DIAGNOSIS — G60.9 IDIOPATHIC PERIPHERAL NEUROPATHY: ICD-10-CM

## 2021-02-03 DIAGNOSIS — R20.2 PARESTHESIA OF BILATERAL LEGS: ICD-10-CM

## 2021-02-03 RX ORDER — TRAMADOL HYDROCHLORIDE 50 MG/1
50 TABLET ORAL EVERY 8 HOURS PRN
Qty: 30 TABLET | Refills: 0 | Status: SHIPPED | OUTPATIENT
Start: 2021-02-03 | End: 2021-04-12 | Stop reason: SDUPTHER

## 2021-02-03 RX ORDER — LANOLIN ALCOHOL/MO/W.PET/CERES
400 CREAM (GRAM) TOPICAL 2 TIMES DAILY
Qty: 60 TABLET | Refills: 4 | Status: SHIPPED | OUTPATIENT
Start: 2021-02-03

## 2021-02-03 NOTE — TELEPHONE ENCOUNTER
Sent two meds to pharmacy    Magnesium supplement--may help with muscles  Tramadol--for pain    Can try two Lyrica at bedtime. I know she'll run out faster doing that but I can send another rx when she needs it.    1/27 referral placed for neurology, I'm hoping they'll get her in soon.

## 2021-02-03 NOTE — TELEPHONE ENCOUNTER
PT CALLED TO REQUEST CALL BACK FROM PCP, PT STATED THAT SHE IS IN MUCH WORSE SHAPE THAN HER LAST VISIT.    PLEASE ADVISE.  CALL BACK:[phone]

## 2021-02-03 NOTE — TELEPHONE ENCOUNTER
Spoke with pt and advised of new meds. Can double up on lyrica at night. Pt understands. Still waiting on neuro ref. Will notify her with appt. Call me at the end of the week to update me on how she is feeling.

## 2021-02-03 NOTE — TELEPHONE ENCOUNTER
"Spoke with pt and she states her leg is much worse that it was last week. IN so much pain she could not sleep last night and she is in \"misery\". She does not think the lyrica is helping. She is still having muscle spams down both legs, arms, and stomach. Electrical pain goes through her head, arms, different places. Pt ask if she can get some pain medication for this. She uses [facility]. Advised I will speak with Dr. Loyd and call her back.   "

## 2021-02-18 ENCOUNTER — TELEPHONE (OUTPATIENT)
Dept: INTERNAL MEDICINE | Facility: CLINIC | Age: 51
End: 2021-02-18

## 2021-02-18 DIAGNOSIS — G60.9 IDIOPATHIC PERIPHERAL NEUROPATHY: Primary | ICD-10-CM

## 2021-02-18 NOTE — TELEPHONE ENCOUNTER
PATIENT CALLED AND WOULD LIKE TO GET A CALL BACK FROM DR. KAPADIA'S NURSE. PATIENT STATED THAT SHE WAS PRESCRIBED SOME MEDICATION FOR HER LEGS AND THE MEDICATION IS NOT WORKING, SHE STILL IS IN A LOT OF PAIN.            CALL BACK NUMBER: [phone]

## 2021-02-18 NOTE — TELEPHONE ENCOUNTER
Spoke with pt and she confirms she is taking mag, tramadol, and 2 doses of Lyrica at night. She has even been taking 2 lyrica throughout the day due to the pain. Pt is not sure what else to do. She has an appt with Neuro on 3/3/2021. Advised Dr. Loyd is out today but will be in tomorrow, I will talk with her and call the pt back to [place].

## 2021-02-19 RX ORDER — PREGABALIN 150 MG/1
150 CAPSULE ORAL 3 TIMES DAILY PRN
Qty: 90 CAPSULE | Refills: 1 | Status: SHIPPED | OUTPATIENT
Start: 2021-02-19 | End: 2021-03-19

## 2021-03-03 ENCOUNTER — OFFICE VISIT (OUTPATIENT)
Dept: NEUROLOGY | Age: 51
End: 2021-03-03

## 2021-03-03 VITALS
HEIGHT: 64 IN | OXYGEN SATURATION: 97 % | TEMPERATURE: 97.7 F | DIASTOLIC BLOOD PRESSURE: 88 MMHG | SYSTOLIC BLOOD PRESSURE: 128 MMHG | BODY MASS INDEX: 36.88 KG/M2 | WEIGHT: 216 LBS | HEART RATE: 76 BPM

## 2021-03-03 DIAGNOSIS — M54.42 CHRONIC BILATERAL LOW BACK PAIN WITH BILATERAL SCIATICA: ICD-10-CM

## 2021-03-03 DIAGNOSIS — R20.0 BILATERAL HAND NUMBNESS: ICD-10-CM

## 2021-03-03 DIAGNOSIS — M54.41 CHRONIC BILATERAL LOW BACK PAIN WITH BILATERAL SCIATICA: ICD-10-CM

## 2021-03-03 DIAGNOSIS — G57.12 MERALGIA PARESTHETICA OF LEFT SIDE: Primary | ICD-10-CM

## 2021-03-03 DIAGNOSIS — F51.04 CHRONIC INSOMNIA: ICD-10-CM

## 2021-03-03 DIAGNOSIS — E11.42 DIABETIC PERIPHERAL NEUROPATHY (HCC): ICD-10-CM

## 2021-03-03 DIAGNOSIS — G89.29 CHRONIC BILATERAL LOW BACK PAIN WITH BILATERAL SCIATICA: ICD-10-CM

## 2021-03-03 PROCEDURE — 99204 OFFICE O/P NEW MOD 45 MIN: CPT | Performed by: PSYCHIATRY & NEUROLOGY

## 2021-03-03 RX ORDER — OXCARBAZEPINE 300 MG/1
300 TABLET, FILM COATED ORAL 2 TIMES DAILY
Qty: 60 TABLET | Refills: 5 | Status: SHIPPED | OUTPATIENT
Start: 2021-03-03 | End: 2021-04-05 | Stop reason: SDUPTHER

## 2021-03-03 NOTE — PROGRESS NOTES
Subjective:     Patient ID: Karli Ricardo is a 50 y.o. female.    CC:   Chief Complaint   Patient presents with   • Consult     NP, in office today to [facility].  Patient states she is having sx of numbnes and tingling in both legs and arms.  Patient states she has had these sx for 2 months with sx getting worse.       HPI:   History of Present Illness  The following portions of the patient's history were reviewed and updated as appropriate: allergies, current medications, past family history, past medical history, past social history, past surgical history and problem list.     51 y/o female with DM c/o increasing numbness and pains, electrical jolts in her hand and legs for about one year, painful numbness in her left lateral thigh, low back pain. She reports restless sleep, daytime drowsiness. She has tried gabapentin 300 mg tid, now on Lyrica 150 mg tid, feels that it is not helping. Recent b12 was 497, folate, 5.97, A1C 14. She is now on insulin awaiting an appointment with endocrinology. Denies use of alcohol.      Past Medical History:   Diagnosis Date   • Acid reflux    • Arthritis    • Asthma    • Chronic bronchitis (CMS/[facility])    • Colon polyp 2015   • Diabetes (CMS/[facility])    • Diverticulitis    • Diverticulosis    • Fracture     lateral left ankle   • Gallstones    • Gout    • Headache    • Hypertension    • Ileitis 2019   • Low back pain    • Neuromuscular disorder (CMS/HCC)    • Thyroid disease    • Visual impairment        Past Surgical History:   Procedure Laterality Date   •  SECTION     • CHOLECYSTECTOMY     • COLONOSCOPY  ,    DR LYLES,DR MARISCAL   • DILATATION AND CURETTAGE     • ENDOSCOPY      DR LOIDA ROBERTS   • FOOT SURGERY     • HYSTERECTOMY     • UPPER GASTROINTESTINAL ENDOSCOPY  2015       Social History     Socioeconomic History   • Marital status:      Spouse name: Not on file   • Number of children: Not on file   •  Years of education: Not on file   • Highest education level: Not on file   Tobacco Use   • Smoking status: Current Every Day Smoker     Packs/day: 1.00     Years: 20.00     Pack years: 20.00     Types: Cigarettes   • Smokeless tobacco: Never Used   Substance and Sexual Activity   • Alcohol use: No     Frequency: Never   • Drug use: No   • Sexual activity: Yes     Partners: Male     Birth control/protection: Surgical       Family History   Problem Relation Age of Onset   • Arthritis Mother    • Mental illness Mother    • Migraines Mother    • Osteoporosis Mother    • Thyroid disease Mother    • Depression Mother    • Heart disease Mother    • Colon cancer Father         passed away at age 53   • Cancer Father    • Thyroid disease Sister         Review of Systems   Constitutional: Negative for chills, fatigue, fever and unexpected weight change.   HENT: Negative for ear pain, hearing loss, nosebleeds, rhinorrhea and sore throat.    Eyes: Negative for photophobia, pain, discharge, itching and visual disturbance.   Respiratory: Negative for cough, chest tightness, shortness of breath and wheezing.    Cardiovascular: Negative for chest pain, palpitations and leg swelling.   Gastrointestinal: Negative for abdominal pain, blood in stool, constipation, diarrhea, nausea and vomiting.   Genitourinary: Negative for dysuria, frequency, hematuria and urgency.   Musculoskeletal: Positive for gait problem. Negative for arthralgias, back pain, joint swelling, myalgias, neck pain and neck stiffness.   Skin: Negative for rash and wound.   Allergic/Immunologic: Negative for environmental allergies and food allergies.   Neurological: Positive for tremors, weakness and numbness. Negative for dizziness, seizures, syncope, speech difficulty, light-headedness and headaches.   Hematological: Negative for adenopathy. Does not bruise/bleed easily.   Psychiatric/Behavioral: Negative for agitation, confusion, decreased concentration,  "hallucinations, sleep disturbance and suicidal ideas. The patient is not nervous/anxious.         Objective:  /88 (BP Location: Left arm, Patient Position: Sitting, Cuff Size: Adult)   Pulse 76   Temp 97.7 °F (36.5 °C) (Temporal)   Ht 162.6 cm (64\")   Wt 98 kg (216 lb)   SpO2 97%   BMI 37.08 kg/m²     Neurologic Exam     Mental Status   Oriented to person, place, and time.     Cranial Nerves     CN III, IV, VI   Pupils are equal, round, and reactive to light.      Physical Exam  Constitutional:       Appearance: She is well-developed.   Eyes:      Extraocular Movements: Extraocular movements intact.      Pupils: Pupils are equal, round, and reactive to light.   Cardiovascular:      Rate and Rhythm: Normal rate and regular rhythm.   Pulmonary:      Effort: Pulmonary effort is normal.   Musculoskeletal:      Comments: SLR is negative for LBP.   Neurological:      Mental Status: She is alert and oriented to person, place, and time.      Deep Tendon Reflexes: Babinski sign absent on the right side. Babinski sign absent on the left side.      Comments: Speech clear, VFF, no facial droop, DTRs hypoactive, sensation to touch and vibration decreased in both lower legs and left lateral thigh.   Psychiatric:         Behavior: Behavior normal.         Thought Content: Thought content normal.         Assessment/Plan:       Diagnoses and all orders for this visit:    1. Meralgia paresthetica of left side (Primary)  -     EMG & Nerve Conduction Test  -     OXcarbazepine (TRILEPTAL) 300 MG tablet; Take 1 tablet by mouth 2 (Two) Times a Day.  Dispense: 60 tablet; Refill: 5    2. Diabetic peripheral neuropathy (CMS/HCC)  -     EMG & Nerve Conduction Test  -     OXcarbazepine (TRILEPTAL) 300 MG tablet; Take 1 tablet by mouth 2 (Two) Times a Day.  Dispense: 60 tablet; Refill: 5        -     Keep appointment with endocrinology.    3. Chronic bilateral low back pain with bilateral sciatica  -     MRI Lumbar Spine Without " Contrast  -     EMG & Nerve Conduction Test  -     OXcarbazepine (TRILEPTAL) 300 MG tablet; Take 1 tablet by mouth 2 (Two) Times a Day.  Dispense: 60 tablet; Refill: 5    4. Bilateral hand numbness  -     EMG & Nerve Conduction Test  -     OXcarbazepine (TRILEPTAL) 300 MG tablet; Take 1 tablet by mouth 2 (Two) Times a Day.  Dispense: 60 tablet; Refill: 5    5. Chronic insomnia        -     Consider sleep study.       MRI of LS spine is requested upon consideration of a spinal mass or stenosis.      Adolfo Thacker MD  3/3/2021

## 2021-03-08 ENCOUNTER — TELEPHONE (OUTPATIENT)
Dept: INTERNAL MEDICINE | Facility: CLINIC | Age: 51
End: 2021-03-08

## 2021-03-08 NOTE — TELEPHONE ENCOUNTER
PATIENT CALLED REQUESTING A CALL BACK FROM DR. GAVIN NURSE.    PATIENT WENT TO SEE A NEUROLOGIST AND SHE WAS PUT ON A NEW MEDICATION AND SHE WANTED ADVICE FROM HER PCP     IT IS A SEIZURE MEDICATION BUT IT HAS A LOT OF SIDE AFFECTS. PATIENT NEEDS TO KNOW IF IT IS OK TO TAKE OR WHAT DR. KAPADIA RECOMMENDS. PATIENT HAS NOT STARTED TAKING IT YET    OXcarbazepine (TRILEPTAL) 300 MG tablet    PLEASE ADVISE AND CALL PATIENT [phone]

## 2021-03-08 NOTE — TELEPHONE ENCOUNTER
Spoke with patient and advised Dr. Loyd is out of the office this week, but will be checking her messages on Thursday.     Pt is worried about all the side effects of the Trileptal. Dr. Bunch told her this med would work well with the Lyrica, but she just wants Dr. Loyd's opinion before she starts taking it.

## 2021-03-17 ENCOUNTER — HOSPITAL ENCOUNTER (OUTPATIENT)
Dept: MRI IMAGING | Facility: HOSPITAL | Age: 51
Discharge: HOME | End: 2021-03-17
Admitting: PSYCHIATRY & NEUROLOGY

## 2021-03-17 PROCEDURE — 72148 MRI LUMBAR SPINE W/O DYE: CPT

## 2021-03-19 ENCOUNTER — OFFICE VISIT (OUTPATIENT)
Dept: INTERNAL MEDICINE | Facility: CLINIC | Age: 51
End: 2021-03-19

## 2021-03-19 VITALS
SYSTOLIC BLOOD PRESSURE: 135 MMHG | DIASTOLIC BLOOD PRESSURE: 70 MMHG | OXYGEN SATURATION: 96 % | TEMPERATURE: 98 F | HEART RATE: 85 BPM | HEIGHT: 64 IN | RESPIRATION RATE: 18 BRPM | BODY MASS INDEX: 38.07 KG/M2 | WEIGHT: 223 LBS

## 2021-03-19 DIAGNOSIS — E53.8 LOW FOLATE: ICD-10-CM

## 2021-03-19 DIAGNOSIS — M54.50 LUMBAR PAIN: ICD-10-CM

## 2021-03-19 DIAGNOSIS — G60.9 IDIOPATHIC PERIPHERAL NEUROPATHY: ICD-10-CM

## 2021-03-19 DIAGNOSIS — R29.898 MUSCLE FUNCTION LOSS: ICD-10-CM

## 2021-03-19 DIAGNOSIS — Z79.4 TYPE 2 DIABETES MELLITUS WITH HYPERGLYCEMIA, WITH LONG-TERM CURRENT USE OF INSULIN (HCC): Primary | ICD-10-CM

## 2021-03-19 DIAGNOSIS — M51.36 ANNULAR TEAR OF LUMBAR DISC: ICD-10-CM

## 2021-03-19 DIAGNOSIS — R20.2 PARESTHESIA OF BILATERAL LEGS: ICD-10-CM

## 2021-03-19 DIAGNOSIS — R20.8 ELECTRICAL SHOCK SENSATION: ICD-10-CM

## 2021-03-19 DIAGNOSIS — R25.3 MUSCLE TWITCH: ICD-10-CM

## 2021-03-19 DIAGNOSIS — E11.65 TYPE 2 DIABETES MELLITUS WITH HYPERGLYCEMIA, WITH LONG-TERM CURRENT USE OF INSULIN (HCC): Primary | ICD-10-CM

## 2021-03-19 LAB — HBA1C MFR BLD: 10.1 %

## 2021-03-19 PROCEDURE — 83036 HEMOGLOBIN GLYCOSYLATED A1C: CPT | Performed by: FAMILY MEDICINE

## 2021-03-19 PROCEDURE — 99214 OFFICE O/P EST MOD 30 MIN: CPT | Performed by: FAMILY MEDICINE

## 2021-03-19 RX ORDER — HYDROCODONE BITARTRATE AND ACETAMINOPHEN 5; 325 MG/1; MG/1
1 TABLET ORAL EVERY 6 HOURS PRN
Qty: 12 TABLET | Refills: 0 | Status: SHIPPED | OUTPATIENT
Start: 2021-03-19 | End: 2021-03-22

## 2021-03-19 RX ORDER — VITAMIN K2 90 MCG
400 CAPSULE ORAL DAILY
Qty: 90 CAPSULE | Refills: 3 | Status: SHIPPED | OUTPATIENT
Start: 2021-03-19

## 2021-03-19 RX ORDER — PREGABALIN 200 MG/1
200 CAPSULE ORAL 3 TIMES DAILY PRN
Qty: 90 CAPSULE | Refills: 3 | Status: SHIPPED | OUTPATIENT
Start: 2021-03-19 | End: 2021-04-12

## 2021-03-19 NOTE — PROGRESS NOTES
Subjective    Karli Ricardo is a 50 y.o. female here for:  Chief Complaint   Patient presents with   • Diabetes       History per MA reviewed.    Patient comes in today to follow-up on diabetes.  She is scheduled to see endocrinology next month but she wants they will move her appointment again.  She feels she is doing better with her diabetes control than she was but she is limited due to her ongoing severe daily pain.  She saw neurology and was put on a new medicine which may have helped some but it affect her mentation somewhat.  She reports she is in agony and needs more for pain.  She admits to taking more Lyrica than prescribed at times and it did not help her pain.  She reports tramadol has not helped her pain either.  She is not going back to see the neurologist until 3 months out which she felt was not appropriate, too far out.  She is open to a second opinion and can go to [place].  She has missed many days of work due to her severe leg pain, tingling, electrical sensation, muscle twitches.  She has FMLA papers to complete again today and she feels she needs more absences on these forms than she has in the past.  She is very tired.  She recently underwent MRI of the back but she has not had an MRI of the brain.  It feels like her upper legs are losing muscle strength and she feels like she is losing muscle mass.         The following portions of the patient's history were reviewed and updated as appropriate: allergies, current medications, past family history, past medical history, past social history, past surgical history and problem list.    Review of Systems   Constitutional: Positive for activity change and fatigue. Negative for fever.   Musculoskeletal: Positive for arthralgias, back pain, gait problem and myalgias.   Neurological: Positive for weakness, numbness and memory problem.   Psychiatric/Behavioral: Positive for stress.       Visit Vitals  /70   Pulse 85   Temp 98 °F (36.7 °C)  "(Temporal)   Resp 18   Ht 162.6 cm (64.02\")   Wt 101 kg (223 lb)   SpO2 96%   BMI 38.26 kg/m²         Objective   Physical Exam  Vitals and nursing note reviewed.   Constitutional:       General: She is not in acute distress.     Appearance: Normal appearance. She is well-developed and well-groomed. She is obese. She is not ill-appearing, toxic-appearing or diaphoretic.      Interventions: Face mask in place.      Comments: Patient appears very tired   HENT:      Head: Normocephalic and atraumatic.      Right Ear: Hearing normal.      Left Ear: Hearing normal.   Eyes:      General: Lids are normal. No scleral icterus.     Extraocular Movements: Extraocular movements intact.   Neck:      Trachea: Phonation normal.   Pulmonary:      Effort: Pulmonary effort is normal.   Skin:     Coloration: Skin is not jaundiced.   Neurological:      General: No focal deficit present.      Mental Status: She is alert and oriented to person, place, and time.      Gait: Gait abnormal.   Psychiatric:         Attention and Perception: Attention and perception normal.         Mood and Affect: Mood and affect normal.         Speech: Speech normal.         Behavior: Behavior normal. Behavior is cooperative.         Thought Content: Thought content normal.         Cognition and Memory: Cognition and memory normal.         Judgment: Judgment normal.         For medical decision making review of the following was required:  Lab Results   Component Value Date    HGBA1C 10.1 03/19/2021    HGBA1C 14.60 (H) 11/24/2020    HGBA1C 11.4 01/06/2020     Component      Latest Ref Rng & Units 11/24/2020   Vitamin B-12      211 - 946 pg/mL 497     Component      Latest Ref Rng & Units 11/24/2020   Folate      4.78 - 24.20 ng/mL 5.97     MRI Lumbar Spine Without Contrast (03/17/2021 16:34)  FINDINGS: The lumbar vertebral bodies maintain a normal height,  alignment and signal intensity. The posterior elements are intact  throughout.      At the L3/4 level " there is a broad-based disc bulge and facet  arthropathy with ligamentum flavum thickening which produces mild  central stenosis and mild right neural foraminal narrowing.     At the L4/5 level there is a mild disc bulge and facet arthropathy,  however there is no significant spinal stenosis or neural foraminal  narrowing.     At the L5/S1 level there is a broad-based disc bulge with an outer  annular tear and facet arthropathy which produces mild central stenosis  and mild bilateral neural foraminal narrowing.     The spinal cord terminates at the T12/L1 level. No conus lesions are  present. The paraspinal soft tissues are unremarkable.     IMPRESSION:  Multilevel degenerative change as discussed above     Progress Notes by Adolfo Thacker MD (03/03/2021 15:30)    Assessment/Plan     Problem List Items Addressed This Visit        Endocrine and Metabolic    Type 2 diabetes mellitus with hyperglycemia, with long-term current use of insulin (CMS/[facility]) - Primary    Relevant Medications    Levomefolate Glucosamine (MethylFolate) 400 MCG capsule    Other Relevant Orders    POC Glycosylated Hemoglobin (Hb A1C) (Completed)      Other Visit Diagnoses     Low folate        Relevant Medications    Levomefolate Glucosamine (MethylFolate) 400 MCG capsule    Paresthesia of bilateral legs        Relevant Medications    pregabalin (Lyrica) 200 MG capsule    HYDROcodone-acetaminophen (Norco) 5-325 MG per tablet    Idiopathic peripheral neuropathy        Relevant Medications    pregabalin (Lyrica) 200 MG capsule    HYDROcodone-acetaminophen (Norco) 5-325 MG per tablet    Annular tear of lumbar disc        Relevant Orders    Ambulatory Referral to Neurosurgery    Lumbar pain        Relevant Orders    Ambulatory Referral to Neurosurgery    Muscle twitch        Relevant Orders    Ambulatory Referral to Neurology    Electrical shock sensation        Relevant Orders    Ambulatory Referral to Neurology    Muscle function loss         Relevant Orders    Ambulatory Referral to Neurology          · May benefit from physical therapy but would like neurosurgery opinion on MRI lumbar findings prior to proceeding.  · Regarding diabetes continue to work on better choices and continue current medications, follow-up with urology.  Needed from continuous glucose monitor if insurance would cover.  · She would like a second opinion from neurology, she is not satisfied with her recent consult in that the doctor there only comes to [place] on occasion.  She would like to be seen sooner than 3 months out as she does not feel she is improving.  I feel Dr. Masterson is a great option for her and I have placed the order for the second opinion.  She may benefit MRI of brain but I will defer this decision to Dr. Masterson.  Continue current medications, I agreed to prescribe some narcotic pain reliever to have on hand for severe breakthrough pain but cautioned against frequent use.  Increased Lyrica to max dose, take as prescribed.  FMLA forms completed.  Discussed Methylfolate to replace folic acid, possibly blood-brain barrier and she may see more benefit with use.  · LAURA reviewed and appropriate.      Return in about 3 weeks (around 4/9/2021) for follow up.     Lovely Loyd MD

## 2021-03-22 NOTE — PROGRESS NOTES
Notify the patient that the MRI of her lumbar spine does show multiple levels of arthritic changes.  No significant narrowing of the spine.  It does appear that she is scheduled to have a nerve and muscle study in [place] on 3/31/2021.    It also appears that the patient has requested on 3/19/21 and has been referred by her PCP to [facility] neurosurgery and neurology in [place]. She should be contacted about those referral. Thanks, DAFNE Worley (I have not seen her but I am covering Dr. Thacker's results while he is out of clinic until 3/28/21)

## 2021-03-23 ENCOUNTER — TELEPHONE (OUTPATIENT)
Dept: NEUROLOGY | Facility: CLINIC | Age: 51
End: 2021-03-23

## 2021-03-23 NOTE — TELEPHONE ENCOUNTER
----- Message from DAFNE Call sent at 3/22/2021  2:24 PM EDT -----  Notify the patient that the MRI of her lumbar spine does show multiple levels of arthritic changes.  No significant narrowing of the spine.  It does appear that she is scheduled to have a nerve and muscle study in [place] on 3/31/2021.    It also appears that the patient has requested on 3/19/21 and has been referred by her PCP to [facility] neurosurgery and neurology in [place]. She should be contacted about those referral. Thanks, DAFNE Worley (I have not seen her but I am covering Dr. Thacker's results while he is out of clinic until 3/28/21)

## 2021-03-31 ENCOUNTER — PROCEDURE VISIT (OUTPATIENT)
Dept: ORTHOPEDIC SURGERY | Facility: CLINIC | Age: 51
End: 2021-03-31

## 2021-03-31 DIAGNOSIS — R20.2 PARESTHESIA: ICD-10-CM

## 2021-03-31 DIAGNOSIS — E11.42 DIABETIC POLYNEUROPATHY ASSOCIATED WITH TYPE 2 DIABETES MELLITUS (HCC): ICD-10-CM

## 2021-03-31 DIAGNOSIS — G56.03 CARPAL TUNNEL SYNDROME, BILATERAL: ICD-10-CM

## 2021-03-31 DIAGNOSIS — E11.42 DIABETIC PERIPHERAL NEUROPATHY (HCC): ICD-10-CM

## 2021-03-31 DIAGNOSIS — M54.17 LUMBOSACRAL NEURITIS: ICD-10-CM

## 2021-03-31 DIAGNOSIS — R20.0 BILATERAL HAND NUMBNESS: ICD-10-CM

## 2021-03-31 PROCEDURE — 95913 NRV CNDJ TEST 13/> STUDIES: CPT | Performed by: PHYSICAL THERAPIST

## 2021-03-31 PROCEDURE — 95886 MUSC TEST DONE W/N TEST COMP: CPT | Performed by: PHYSICAL THERAPIST

## 2021-04-02 ENCOUNTER — TELEPHONE (OUTPATIENT)
Dept: NEUROLOGY | Facility: CLINIC | Age: 51
End: 2021-04-02

## 2021-04-02 ENCOUNTER — TELEPHONE (OUTPATIENT)
Dept: INTERNAL MEDICINE | Facility: CLINIC | Age: 51
End: 2021-04-02

## 2021-04-02 NOTE — TELEPHONE ENCOUNTER
Caller: Karli Ricardo    Relationship: Self    Best call back number: [phone]    Caller requesting test results: YES    What test was performed: NERVE TEST    When was the test performed:03/31    Where was the test performed: [facility]    Additional notes:

## 2021-04-02 NOTE — TELEPHONE ENCOUNTER
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 Keep F/U, thanks.  ----- Message -----  From: Interface, Scans Incoming  Sent: 4/1/2021   9:45 AM EDT  To: Adolfo Thacker MD

## 2021-04-02 NOTE — TELEPHONE ENCOUNTER
Provider:     Caller: PT    Relationship to Patient: SELF      Pharmacy: NA    Phone Number: [phone]    Reason for Call: PATIENT IS REQUESTING A CALL TO GO OVER EMG RESULTS. PLEASE ADVISE.     When was the patient last seen: 3-3-21

## 2021-04-05 ENCOUNTER — OFFICE VISIT (OUTPATIENT)
Dept: NEUROLOGY | Facility: CLINIC | Age: 51
End: 2021-04-05

## 2021-04-05 VITALS
DIASTOLIC BLOOD PRESSURE: 78 MMHG | HEIGHT: 64 IN | HEART RATE: 88 BPM | SYSTOLIC BLOOD PRESSURE: 126 MMHG | OXYGEN SATURATION: 94 % | BODY MASS INDEX: 37.9 KG/M2 | WEIGHT: 222 LBS

## 2021-04-05 DIAGNOSIS — R20.0 BILATERAL HAND NUMBNESS: ICD-10-CM

## 2021-04-05 DIAGNOSIS — G89.29 CHRONIC BILATERAL LOW BACK PAIN WITH BILATERAL SCIATICA: ICD-10-CM

## 2021-04-05 DIAGNOSIS — M54.42 CHRONIC BILATERAL LOW BACK PAIN WITH BILATERAL SCIATICA: ICD-10-CM

## 2021-04-05 DIAGNOSIS — R42 DIZZINESS: ICD-10-CM

## 2021-04-05 DIAGNOSIS — M54.41 CHRONIC BILATERAL LOW BACK PAIN WITH BILATERAL SCIATICA: ICD-10-CM

## 2021-04-05 DIAGNOSIS — G57.12 MERALGIA PARESTHETICA OF LEFT SIDE: ICD-10-CM

## 2021-04-05 DIAGNOSIS — E11.42 DIABETIC PERIPHERAL NEUROPATHY (HCC): ICD-10-CM

## 2021-04-05 PROCEDURE — 99214 OFFICE O/P EST MOD 30 MIN: CPT | Performed by: NURSE PRACTITIONER

## 2021-04-05 RX ORDER — OXCARBAZEPINE 600 MG/1
600 TABLET, FILM COATED ORAL 2 TIMES DAILY
Qty: 60 TABLET | Refills: 5 | Status: SHIPPED | OUTPATIENT
Start: 2021-04-05 | End: 2021-05-03 | Stop reason: ALTCHOICE

## 2021-04-05 NOTE — PROGRESS NOTES
Subjective   Patient ID: Karli Ricardo is a 50 y.o. female     Chief Complaint   Patient presents with   • Meralgia     NP        History of Present Illness    Referred by Dr. Loyd for N/T.     Pain first started approximately 3-4 months ago steadily worsening. Left side worse than right.    Tingling and pain in bilateral lower extremities, feels like they are on fire and having cramping. Does not feel like her arms are as bad as her legs, but they also have the sensation. Sensation has extetnded into her lower stomach.  Currently taking Lyrica 200 mg PO TID, Trileptal 300 PO BID.     Denies saddle anesthesia or bowel/bladder incontinence, headaches, fatigue, or difficulty ambulating.     Was having some electrical shock sensation and Trileptal has improved this.     Is having difficulty sleeping at night. Feeling more clumsy. Having muscle spasms in her calves and thighs.     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 Wears only one brace at night time. Feels it is thick and bulky and difficult to wear.      Last HA1C was 10.1 on 3/19/21.     Labs 12/3/20: CBC, CMP, Mag - N[facility]   11/24/20: B12/Folate - [facility]     Has an appointment with NS for her back pain on 4/28/21    Medical Records Reviewed:   Dr. Chand OV 3/3/21: 51 y/o female with DM c/o increasing numbness and pains, electrical jolts in her hand and legs for about one year, painful numbness in her left lateral thigh, low back pain. She reports restless sleep, daytime drowsiness. She has tried gabapentin 300 mg tid, now on Lyrica 150 mg tid, feels that it is not helping. Recent b12 was 497, folate, 5.97, A1C 14. She is now on insulin awaiting an appointment with endocrinology. Denies use of alcohol.    MRI Lumbar Spine 3/17/21: multilevel degenerative changes    EMG/NCS 3/31/21: + for diabetic neuropathy, mild to moderate CTS in bilateral wrists.     Past Medical History:   Diagnosis Date   • Acid reflux    • Arthritis    • Asthma    •  "Chronic bronchitis (CMS/[facility])    • Colon polyp 08/25/2015   • Diabetes (CMS/[facility])    • Diverticulitis    • Diverticulosis 2017   • Fracture     lateral left ankle   • Gallstones    • Gout    • Headache 2016   • Hypertension    • Ileitis 6/13/2019   • Low back pain 2008   • Neuromuscular disorder (CMS/[facility]) 2006   • Thyroid disease    • Visual impairment 2014     Family History   Problem Relation Age of Onset   • Arthritis Mother    • Mental illness Mother    • Migraines Mother    • Osteoporosis Mother    • Thyroid disease Mother    • Depression Mother    • Heart disease Mother    • Colon cancer Father         passed away at age 53   • Cancer Father    • Thyroid disease Sister      Social History     Socioeconomic History   • Marital status:      Spouse name: Not on file   • Number of children: Not on file   • Years of education: Not on file   • Highest education level: Not on file   Tobacco Use   • Smoking status: Current Every Day Smoker     Packs/day: 1.00     Years: 20.00     Pack years: 20.00     Types: Cigarettes   • Smokeless tobacco: Never Used   Vaping Use   • Vaping Use: Never used   Substance and Sexual Activity   • Alcohol use: No   • Drug use: No   • Sexual activity: Yes     Partners: Male     Birth control/protection: Surgical       Review of Systems    Objective     Vitals:    04/05/21 1351   BP: 126/78   Pulse: 88   SpO2: 94%   Weight: 101 kg (222 lb)   Height: 162.6 cm (64.02\")       Neurologic Exam     Mental Status   Oriented to person, place, and time.   Attention: normal. Concentration: normal.   Speech: speech is normal   Level of consciousness: alert  Knowledge: good and consistent with education.   Able to name object. Able to read. Able to repeat. Able to write. Normal comprehension.     Cranial Nerves     CN II   Visual fields full to confrontation.   Visual acuity: normal  Right visual field deficit: none  Left visual field deficit: none     CN III, IV, VI   Pupils are equal, round, and " reactive to light.  Extraocular motions are normal.   Right pupil: Shape: regular. Reactivity: brisk. Consensual response: intact.   Left pupil: Shape: regular. Reactivity: brisk. Consensual response: intact.   Nystagmus: none   Diplopia: none  Ophthalmoparesis: none  Upgaze: normal  Downgaze: normal  Conjugate gaze: present  Vestibulo-ocular reflex: present    CN V   Facial sensation intact.   Right corneal reflex: normal  Left corneal reflex: normal    CN VII   Right facial weakness: none  Left facial weakness: none    CN VIII   Hearing: intact    CN IX, X   Palate: symmetric  Right gag reflex: normal  Left gag reflex: normal    CN XI   Right sternocleidomastoid strength: normal  Left sternocleidomastoid strength: normal    CN XII   Tongue: not atrophic  Fasciculations: absent  Tongue deviation: none    Motor Exam   Muscle bulk: normal  Overall muscle tone: normal  Right arm tone: normal  Left arm tone: normal  Right leg tone: normal  Left leg tone: normal    Strength   Strength 5/5 throughout.     Sensory Exam   Proprioception normal.   Decreased sensation to pinprick and light touch from elbows up bilaterally, and knees up. Forearms and shins with normal sensation bilaterally      Gait, Coordination, and Reflexes     Gait  Gait: normal    Coordination   Finger to nose coordination: normal  Tandem walking coordination: normal    Tremor   Resting tremor: absent  Intention tremor: absent  Action tremor: absent    Reflexes   Reflexes 2+ except as noted.       Physical Exam  Vitals and nursing note reviewed.   Constitutional:       Appearance: Normal appearance.   HENT:      Head: Normocephalic and atraumatic.   Eyes:      Extraocular Movements: Extraocular movements intact and EOM normal.      Pupils: Pupils are equal, round, and reactive to light.   Cardiovascular:      Rate and Rhythm: Normal rate.   Pulmonary:      Effort: Pulmonary effort is normal.   Skin:     General: Skin is warm and dry.   Neurological:       Mental Status: She is alert and oriented to person, place, and time.      Coordination: Finger-Nose-Finger Test normal.      Gait: Gait is intact. Tandem walk normal.      Deep Tendon Reflexes: Strength normal.   Psychiatric:         Mood and Affect: Mood normal.         Speech: Speech normal.         Behavior: Behavior normal.         Office Visit on 03/19/2021   Component Date Value Ref Range Status   • Hemoglobin A1C 03/19/2021 10.1  % Final         Assessment/Plan     Problem List Items Addressed This Visit        Musculoskeletal and Injuries    Chronic bilateral low back pain with bilateral sciatica    Current Assessment & Plan     Keep appointment with NS          Relevant Medications    OXcarbazepine (TRILEPTAL) 600 MG tablet       Neuro    Diabetic peripheral neuropathy (CMS/HCC)    Current Assessment & Plan     Diabetes is worsening.   Continue current treatment regimen.  Diabetes will be reassessed next appointment .    Continue Lyrica, increase OXC to 600 mg PO BID. If pain continues with increased OXC will move back to GBP at a higher dosage as she reports she only took 300 mg PO TID.     F/U in 4 weeks or sooner if needed          Relevant Medications    metFORMIN ER (Glucophage XR) 500 MG 24 hr tablet    Insulin Glargine, 1 Unit Dial, (Toujeo SoloStar) 300 UNIT/ML solution pen-injector injection    Insulin Lispro (HumaLOG KwikPen) 200 UNIT/ML solution pen-injector    OXcarbazepine (TRILEPTAL) 600 MG tablet       Symptoms and Signs    Bilateral hand numbness    Current Assessment & Plan     Instructed to wear a cock-up wrist splint on bilateral UE nightly          Relevant Medications    OXcarbazepine (TRILEPTAL) 600 MG tablet      Other Visit Diagnoses     Dizziness        Meralgia paresthetica of left side        Relevant Medications    OXcarbazepine (TRILEPTAL) 600 MG tablet             Return in about 4 weeks (around 5/3/2021).

## 2021-04-05 NOTE — ASSESSMENT & PLAN NOTE
Diabetes is worsening.   Continue current treatment regimen.  Diabetes will be reassessed next appointment .    Continue Lyrica, increase OXC to 600 mg PO BID. If pain continues with increased OXC will move back to GBP at a higher dosage as she reports she only took 300 mg PO TID.     F/U in 4 weeks or sooner if needed

## 2021-04-12 ENCOUNTER — OFFICE VISIT (OUTPATIENT)
Dept: INTERNAL MEDICINE | Facility: CLINIC | Age: 51
End: 2021-04-12

## 2021-04-12 VITALS
BODY MASS INDEX: 38.07 KG/M2 | SYSTOLIC BLOOD PRESSURE: 120 MMHG | DIASTOLIC BLOOD PRESSURE: 70 MMHG | HEIGHT: 64 IN | HEART RATE: 81 BPM | TEMPERATURE: 97.7 F | OXYGEN SATURATION: 95 % | RESPIRATION RATE: 18 BRPM | WEIGHT: 223 LBS

## 2021-04-12 DIAGNOSIS — R20.2 PARESTHESIA OF BILATERAL LEGS: ICD-10-CM

## 2021-04-12 DIAGNOSIS — E11.42 DIABETIC PERIPHERAL NEUROPATHY (HCC): Primary | ICD-10-CM

## 2021-04-12 DIAGNOSIS — G60.9 IDIOPATHIC PERIPHERAL NEUROPATHY: ICD-10-CM

## 2021-04-12 DIAGNOSIS — Z79.4 TYPE 2 DIABETES MELLITUS WITH HYPERGLYCEMIA, WITH LONG-TERM CURRENT USE OF INSULIN (HCC): ICD-10-CM

## 2021-04-12 DIAGNOSIS — E11.65 TYPE 2 DIABETES MELLITUS WITH HYPERGLYCEMIA, WITH LONG-TERM CURRENT USE OF INSULIN (HCC): ICD-10-CM

## 2021-04-12 DIAGNOSIS — E53.8 LOW FOLATE: ICD-10-CM

## 2021-04-12 DIAGNOSIS — I15.2 HYPERTENSION ASSOCIATED WITH DIABETES (HCC): ICD-10-CM

## 2021-04-12 DIAGNOSIS — E11.59 HYPERTENSION ASSOCIATED WITH DIABETES (HCC): ICD-10-CM

## 2021-04-12 PROCEDURE — 99214 OFFICE O/P EST MOD 30 MIN: CPT | Performed by: FAMILY MEDICINE

## 2021-04-12 RX ORDER — TRAMADOL HYDROCHLORIDE 50 MG/1
50 TABLET ORAL EVERY 8 HOURS PRN
Qty: 30 TABLET | Refills: 0 | Status: SHIPPED | OUTPATIENT
Start: 2021-04-12 | End: 2021-05-03 | Stop reason: ALTCHOICE

## 2021-04-12 RX ORDER — GABAPENTIN 600 MG/1
600 TABLET ORAL 4 TIMES DAILY PRN
Qty: 120 TABLET | Refills: 1 | Status: SHIPPED | OUTPATIENT
Start: 2021-04-12 | End: 2022-08-18

## 2021-04-12 NOTE — ASSESSMENT & PLAN NOTE
Hypertension is improving with treatment.  Continue current treatment regimen.  Dietary sodium restriction.  Weight loss.  Regular aerobic exercise.  Ambulatory blood pressure monitoring.  Blood pressure will be reassessed at the next regular appointment.

## 2021-04-12 NOTE — PROGRESS NOTES
"Subjective    Georgia Shameka Ricardo is a 50 y.o. female here for:  Chief Complaint   Patient presents with   • Leg Pain     Still having cramps and spasms in both legs. She has not picked up the L-methlyfolate yet.        History per MA reviewed.  Scheduled to see endocrinology 4/15/21, neurosurgery 4/28/21, neurology 5/3/21    Saw neurology, doesn't feel much has helped  They discussed going back to gabapentin from Lyrica  Patient notes only relief she's seemed to have gotten was from lortab  Reports not leaving home much in last month due to pain  Has diabetes mellitus visit with endocrinology this week  Patient describes herself today as \"kathy downer\" and apologizes for poor mood.         The following portions of the patient's history were reviewed and updated as appropriate: allergies, current medications, past family history, past medical history, past social history, past surgical history and problem list.    Review of Systems    Visit Vitals  /70   Pulse 81   Temp 97.7 °F (36.5 °C)   Resp 18   Ht 162.6 cm (64.02\")   Wt 101 kg (223 lb)   SpO2 95%   BMI 38.26 kg/m²         Objective   Physical Exam  Vitals and nursing note reviewed.   Constitutional:       General: She is not in acute distress.     Appearance: Normal appearance. She is well-developed and well-groomed. She is obese. She is not ill-appearing, toxic-appearing or diaphoretic.      Interventions: Face mask in place.   HENT:      Head: Normocephalic and atraumatic.      Right Ear: Hearing normal.      Left Ear: Hearing normal.   Eyes:      General: Lids are normal. No scleral icterus.     Extraocular Movements: Extraocular movements intact.   Neck:      Trachea: Phonation normal.   Pulmonary:      Effort: Pulmonary effort is normal.   Skin:     Coloration: Skin is not jaundiced or pale.   Neurological:      General: No focal deficit present.      Mental Status: She is alert and oriented to person, place, and time.      Motor: Motor function " is intact.   Psychiatric:         Attention and Perception: Attention and perception normal.         Mood and Affect: Mood is depressed. Affect is flat.         Speech: Speech normal.         Behavior: Behavior normal. Behavior is cooperative.         Thought Content: Thought content normal.         Cognition and Memory: Cognition and memory normal.         For medical decision making review of the following was required:  Lab Results   Component Value Date    HGBA1C 10.1 03/19/2021    HGBA1C 14.60 (H) 11/24/2020    HGBA1C 11.4 01/06/2020     Office Visit with Ashia Rhodes APRN (04/05/2021)  Problem List Items Addressed This Visit        Musculoskeletal and Injuries    Chronic bilateral low back pain with bilateral sciatica    Current Assessment & Plan      Keep appointment with NS           Relevant Medications    OXcarbazepine (TRILEPTAL) 600 MG tablet        Neuro    Diabetic peripheral neuropathy (CMS/HCC)    Current Assessment & Plan      Diabetes is worsening.   Continue current treatment regimen.  Diabetes will be reassessed next appointment .     Continue Lyrica, increase OXC to 600 mg PO BID. If pain continues with increased OXC will move back to GBP at a higher dosage as she reports she only took 300 mg PO TID.      F/U in 4 weeks or sooner if needed           Relevant Medications    metFORMIN ER (Glucophage XR) 500 MG 24 hr tablet    Insulin Glargine, 1 Unit Dial, (Toujeo SoloStar) 300 UNIT/ML solution pen-injector injection    Insulin Lispro (HumaLOG KwikPen) 200 UNIT/ML solution pen-injector    OXcarbazepine (TRILEPTAL) 600 MG tablet        Symptoms and Signs    Bilateral hand numbness    Current Assessment & Plan      Instructed to wear a cock-up wrist splint on bilateral UE nightly           Relevant Medications    OXcarbazepine (TRILEPTAL) 600 MG tablet    Other Visit Diagnoses    Dizziness       Meralgia paresthetica of left side       Relevant Medications   OXcarbazepine (TRILEPTAL) 600  MG tablet          SCANNED EMG (03/31/2021)         Assessment/Plan     Problem List Items Addressed This Visit        Cardiac and Vasculature    Hypertension associated with diabetes (CMS/HCC)    Current Assessment & Plan     Hypertension is improving with treatment.  Continue current treatment regimen.  Dietary sodium restriction.  Weight loss.  Regular aerobic exercise.  Ambulatory blood pressure monitoring.  Blood pressure will be reassessed at the next regular appointment.            Endocrine and Metabolic    Type 2 diabetes mellitus with hyperglycemia, with long-term current use of insulin (CMS/HCC)       Neuro    Diabetic peripheral neuropathy (CMS/HCC) - Primary    Relevant Medications    gabapentin (NEURONTIN) 600 MG tablet      Other Visit Diagnoses     Paresthesia of bilateral legs        Relevant Medications    traMADol (ULTRAM) 50 MG tablet    Idiopathic peripheral neuropathy        Relevant Medications    traMADol (ULTRAM) 50 MG tablet    Low folate        encouraged patient to get L-methylfolate, can order from [employer] if she's not leaving home.          · See endocrinology regarding diabetes mellitus as scheduled. Discussed need to get diabetes mellitus under control to help with neuropathy long term  · Agreed to trying gabapentin higher dose, up to 4x a day. Not to take with Lyrica, it's either one or other. Patient was discouraged from narcotics as tolerance builds over time and patients on long term  Narcotics still have daily pain. Can consider pain management though if she wishes especially if gabapentin doesn't help. Refilled tramadol, use sparingly, metabolized to opiate-like medicine and is also not good long term. Educated that opiates are good for short courses. LAURA reviewed and appropriate.      Return in about 3 months (around 7/12/2021) for diabetic neuropathy.     Lovely Loyd MD

## 2021-04-15 ENCOUNTER — OFFICE VISIT (OUTPATIENT)
Dept: ENDOCRINOLOGY | Facility: CLINIC | Age: 51
End: 2021-04-15

## 2021-04-15 ENCOUNTER — LAB (OUTPATIENT)
Dept: LAB | Facility: HOSPITAL | Age: 51
End: 2021-04-15

## 2021-04-15 VITALS
WEIGHT: 223 LBS | HEIGHT: 64 IN | SYSTOLIC BLOOD PRESSURE: 124 MMHG | HEART RATE: 81 BPM | TEMPERATURE: 97.7 F | OXYGEN SATURATION: 96 % | DIASTOLIC BLOOD PRESSURE: 78 MMHG | BODY MASS INDEX: 38.07 KG/M2

## 2021-04-15 DIAGNOSIS — L65.9 HAIR LOSS: ICD-10-CM

## 2021-04-15 DIAGNOSIS — IMO0002 DIABETES MELLITUS TYPE 2, UNCONTROLLED, WITH COMPLICATIONS: Primary | ICD-10-CM

## 2021-04-15 LAB
ALBUMIN UR-MCNC: <1.2 MG/DL
CREAT UR-MCNC: 116.9 MG/DL
EXPIRATION DATE: ABNORMAL
GLUCOSE BLDC GLUCOMTR-MCNC: 139 MG/DL (ref 70–130)
Lab: ABNORMAL
MICROALBUMIN/CREAT UR: NORMAL MG/G{CREAT}
T4 FREE SERPL-MCNC: 0.92 NG/DL (ref 0.93–1.7)
TSH SERPL DL<=0.05 MIU/L-ACNC: 3.35 UIU/ML (ref 0.27–4.2)

## 2021-04-15 PROCEDURE — 82570 ASSAY OF URINE CREATININE: CPT | Performed by: INTERNAL MEDICINE

## 2021-04-15 PROCEDURE — 82043 UR ALBUMIN QUANTITATIVE: CPT | Performed by: INTERNAL MEDICINE

## 2021-04-15 PROCEDURE — 99204 OFFICE O/P NEW MOD 45 MIN: CPT | Performed by: INTERNAL MEDICINE

## 2021-04-15 PROCEDURE — 84443 ASSAY THYROID STIM HORMONE: CPT | Performed by: INTERNAL MEDICINE

## 2021-04-15 PROCEDURE — 84439 ASSAY OF FREE THYROXINE: CPT | Performed by: INTERNAL MEDICINE

## 2021-04-15 PROCEDURE — 82947 ASSAY GLUCOSE BLOOD QUANT: CPT | Performed by: INTERNAL MEDICINE

## 2021-04-15 PROCEDURE — 84436 ASSAY OF TOTAL THYROXINE: CPT | Performed by: INTERNAL MEDICINE

## 2021-04-15 RX ORDER — INSULIN GLARGINE 300 U/ML
75 INJECTION, SOLUTION SUBCUTANEOUS NIGHTLY
Qty: 7.5 ML | Refills: 5 | Status: SHIPPED | OUTPATIENT
Start: 2021-04-15 | End: 2022-01-05 | Stop reason: SDUPTHER

## 2021-04-15 RX ORDER — INSULIN LISPRO 200 [IU]/ML
25-35 INJECTION, SOLUTION SUBCUTANEOUS
Qty: 18 ML | Refills: 3 | Status: SHIPPED | OUTPATIENT
Start: 2021-04-15 | End: 2022-01-05 | Stop reason: SDUPTHER

## 2021-04-15 NOTE — PROGRESS NOTES
Chief Complaint   Patient presents with   • Diabetes        New patient who is being seen in consultation regarding diabetes at the request of Lovely Loyd MD     HPI   Karli Ricardo is a 50 y.o. female who presents for evaluation of diabetes.    Patient has a history of type 2 diabetes.  This was initially diagnosed >15 years ago on routine labs.  Patient was initially started on metformin.  Patient is currently taking Glargine 55 units nightly, Humalog 30 units before meals, Metformin 1000 mg twice daily.  This regimen was last changed several months ago..  Patient believes that glycemic control is improving.  Patient reports most recent A1c was 10.1. Patient reports good adherence to medications.     Patient reports hypoglycemia awareness.  Patient reports she occasionally has symptoms concerning for hypoglycemia when glucose less than 100.  She denies any blood glucose less than 70.  Patient monitors blood glucose 4-5. Patient reports glucose generally 300-400 in the AM. After eating usually 200-300 and stable the rest of the day.   Patient works to follow diabetic diet.  Patient does not exercise regularly. Has sedentary job.       Patient's last dilated eye exam was more than 1 year ago.  Patient denies acute visual changes.  Patient denies foot related concerns such as numbness, tingling, or pain. Patient reports numbness and tingling of the arms and legs.  Recently put on gabapentin by her PCP.  History of dyslipidemia: Yes  History of renal dysfunction: No  History of Hypertension: No    Patient also reports concern for hair loss and possible hypothyroidism.  She is reported to check thyroid hormone in the past but that this is subsequently discontinued.  She has not taken levothyroxine in several years.  She denies any significant weight changes, changes in bowel habits, heat or cold intolerance.    Past Medical History:   Diagnosis Date   • Acid reflux    • Arthritis    • Asthma    •  Chronic bronchitis (CMS/[facility])    • Colon polyp 2015   • Diabetes (CMS/[facility])    • Diverticulitis    • Diverticulosis    • Fracture     lateral left ankle   • Gallstones    • Gout    • Headache    • Hypertension    • Ileitis 2019   • Low back pain 2008   • Neuromuscular disorder (CMS/[facility])    • Thyroid disease    • Type 2 diabetes mellitus (CMS/[facility])    • Visual impairment      Past Surgical History:   Procedure Laterality Date   •  SECTION     • CHOLECYSTECTOMY     • COLONOSCOPY  ,    DR LYLES,DR MARISCAL   • DILATATION AND CURETTAGE     • ENDOSCOPY      DR LOIDA ROBERTS   • FOOT SURGERY     • HYSTERECTOMY     • UPPER GASTROINTESTINAL ENDOSCOPY  2015      Family History   Problem Relation Age of Onset   • Arthritis Mother    • Mental illness Mother    • Migraines Mother    • Osteoporosis Mother    • Thyroid disease Mother    • Depression Mother    • Heart disease Mother    • Colon cancer Father         passed away at age 53   • Cancer Father    • Thyroid disease Sister       Social History     Socioeconomic History   • Marital status:      Spouse name: Not on file   • Number of children: Not on file   • Years of education: Not on file   • Highest education level: Not on file   Tobacco Use   • Smoking status: Current Every Day Smoker     Packs/day: 1.00     Years: 20.00     Pack years: 20.00     Types: Cigarettes   • Smokeless tobacco: Never Used   Vaping Use   • Vaping Use: Never used   Substance and Sexual Activity   • Alcohol use: No   • Drug use: No   • Sexual activity: Yes     Partners: Male     Birth control/protection: Surgical      Allergies   Allergen Reactions   • Latex Swelling     Happened once      Current Outpatient Medications on File Prior to Visit   Medication Sig Dispense Refill   • albuterol sulfate  (90 Base) MCG/ACT inhaler Inhale 2 puffs Every 6 (Six) Hours As Needed for Wheezing or Shortness of Air. 1 inhaler 0   • fluconazole (DIFLUCAN)  150 MG tablet TAKE 1 TABLET BY MOUTH NOW, then repeat in 4 days if needed 2 tablet 11   • fluocinonide (LIDEX) 0.05 % ointment Apply  topically to the appropriate area as directed 2 (Two) Times a Day. 120 g 1   • fluticasone (Flonase) 50 MCG/ACT nasal spray 2 sprays into the nostril(s) as directed by provider Daily. 1 bottle 5   • gabapentin (NEURONTIN) 600 MG tablet Take 1 tablet by mouth 4 (Four) Times a Day As Needed (neuropathic pain). 120 tablet 1   • Insulin Glargine, 1 Unit Dial, (Toujeo SoloStar) 300 UNIT/ML solution pen-injector injection Inject 55 Units under the skin into the appropriate area as directed Every Night. 4 pen 5   • Insulin Lispro (HumaLOG KwikPen) 200 UNIT/ML solution pen-injector Inject 10 Units under the skin into the appropriate area as directed 3 (Three) Times a Day With Meals. (Patient taking differently: Inject 30 Units under the skin into the appropriate area as directed 3 (Three) Times a Day With Meals. 30 units tid before meals) 2 pen 5   • Insulin Pen Needle (BD Pen Needle Oneyda U/F) 32G X 4 MM misc 1 Units 4 (Four) Times a Day. 100 each 12   • Levomefolate Glucosamine (MethylFolate) 400 MCG capsule Take 400 mcg by mouth Daily. For low folic acid 90 capsule 3   • lisinopril (PRINIVIL,ZESTRIL) 5 MG tablet Take 1 tablet by mouth Daily. Indications: diabetes 90 tablet 3   • Magnesium Oxide 400 (240 Mg) MG tablet Take 1 tablet by mouth 2 (Two) Times a Day. 60 tablet 4   • metFORMIN ER (Glucophage XR) 500 MG 24 hr tablet Take 2 tablets by mouth 2 (Two) Times a Day. Indications: Type 2 Diabetes 360 tablet 3   • ONE TOUCH ULTRA TEST test strip USE TO CHECK BLOOD GLUCOSE ONCE DAILY OR AS DIRECTED  12   • OXcarbazepine (TRILEPTAL) 600 MG tablet Take 1 tablet by mouth 2 (Two) Times a Day. 60 tablet 5   • pantoprazole (PROTONIX) 40 MG EC tablet Take 1 tablet by mouth Daily As Needed (heartburn/reflux). 90 tablet 3   • traMADol (ULTRAM) 50 MG tablet Take 1 tablet by mouth Every 8 (Eight) Hours  "As Needed for Severe Pain . 30 tablet 0   • triamcinolone (KENALOG) 0.1 % cream      • varenicline (CHANTIX) 1 MG tablet Take 1 tablet by mouth 2 (Two) Times a Day for 140 days. 56 tablet 4     No current facility-administered medications on file prior to visit.        Review of Systems   Constitutional: Negative for fatigue, unexpected weight gain and unexpected weight loss.   HENT: Positive for sinus pressure and trouble swallowing.    Eyes: Positive for photophobia, pain, redness, itching and visual disturbance.   Respiratory: Positive for shortness of breath and wheezing.    Cardiovascular: Positive for palpitations and leg swelling.   Gastrointestinal: Positive for abdominal pain, constipation and GERD.   Endocrine: Positive for cold intolerance, heat intolerance, polydipsia and polyuria.   Musculoskeletal: Positive for arthralgias, myalgias, neck pain and neck stiffness.   Skin: Negative for dry skin and rash.   Allergic/Immunologic: Positive for environmental allergies.   Neurological: Positive for dizziness, weakness and light-headedness.   Psychiatric/Behavioral: Positive for decreased concentration and sleep disturbance.        Vitals:    04/15/21 1416   BP: 124/78   BP Location: Right arm   Patient Position: Sitting   Cuff Size: Adult   Pulse: 81   Temp: 97.7 °F (36.5 °C)   TempSrc: Infrared   SpO2: 96%   Weight: 101 kg (223 lb)   Height: 162.6 cm (64\")   Body mass index is 38.28 kg/m².     Physical Exam  Vitals reviewed.   Constitutional:       General: She is not in acute distress.     Appearance: She is obese.   HENT:      Head: Normocephalic and atraumatic.      Right Ear: Hearing normal.      Left Ear: Hearing normal.      Nose: Nose normal.   Eyes:      General: Lids are normal.      Conjunctiva/sclera: Conjunctivae normal.   Neck:      Thyroid: Thyromegaly present. No thyroid tenderness.   Cardiovascular:      Rate and Rhythm: Normal rate and regular rhythm.      Heart sounds: No murmur heard. "     Pulmonary:      Effort: Pulmonary effort is normal.      Breath sounds: Normal breath sounds and air entry.   Abdominal:      General: Abdomen is protuberant. Bowel sounds are normal.      Palpations: Abdomen is soft.      Tenderness: There is no abdominal tenderness.   Lymphadenopathy:      Head:      Right side of head: No submandibular adenopathy.      Left side of head: No submandibular adenopathy.      Cervical: No cervical adenopathy.   Skin:     General: Skin is warm and dry.      Findings: No rash.   Neurological:      General: No focal deficit present.      Deep Tendon Reflexes: Reflexes are normal and symmetric.   Psychiatric:         Mood and Affect: Mood and affect normal.         Behavior: Behavior is cooperative.        Labs/Imaging  Results for SEB QUIJANO (MRN [id number]) as of 4/15/2021 17:13   Ref. Range 3/19/2021 15:29 3/31/2021 00:00 4/15/2021 14:35   Glucose Latest Ref Range: 70 - 130 mg/dL   139 (A)   Hemoglobin A1C Latest Units: % 10.1         Assessment and Plan    Diagnoses and all orders for this visit:    1. Diabetes mellitus type 2, uncontrolled, with complications (CMS/[facility]) (Primary)  -Uncontrolled with hemoglobin A1c 10.1%, patient reports hyperglycemia  -Patient currently on bolus heavy insulin regimen, will plan to increase by approximately 10% overall and redistribute  -Patient to increase Toujeo to 75 units daily  Change Humalog to 25 units with meals plus correction 3 for 50 greater than 150  Patient was advised to monitor blood sugar 3 times daily.  Patient was instructed to bring glucometer to all future appointments. Patient should contact the clinic between appointments with hypoglycemia or persistent hyperglycemia.  Discussed signs and symptoms of hypoglycemia as well as hypoglycemia management via the rule of 15's.  Discussed potential for long-term complications with uncontrolled diabetes including nephropathy, neuropathy, retinopathy, increased risk for  cardiac disease.  Discussed the role of diet and exercise in the management of diabetes.   CMP up-to-date from December 2020, GFR 92  Lipid panel up-to-date from November 2020, triglycerides 401, LDL 78  Urine microalbumin due, ordered  -     POC Glucose, Blood  -     Microalbumin / Creatinine Urine Ratio - Urine, Clean Catch    2. Hair loss  -Patient also reports history of hypothyroidism requiring replacement  -Most recent TSH normal in November 2020  -Repeat thyroid function testing today  -     TSH  -     T4, Free    Return in about 3 months (around 7/15/2021). The patient was instructed to contact the clinic with any interval questions or concerns.    Madina Valencia MD     Please note that portions of this document were completed using a voice recognition program. Efforts were made to edit the dictations, but occasionally words are mis-transcribed.

## 2021-04-16 DIAGNOSIS — L65.9 HAIR LOSS: Primary | ICD-10-CM

## 2021-04-16 LAB — T4 SERPL-MCNC: 6.9 MCG/DL (ref 4.5–11.7)

## 2021-05-03 ENCOUNTER — OFFICE VISIT (OUTPATIENT)
Dept: NEUROLOGY | Facility: CLINIC | Age: 51
End: 2021-05-03

## 2021-05-03 VITALS
BODY MASS INDEX: 37.39 KG/M2 | TEMPERATURE: 96.9 F | OXYGEN SATURATION: 94 % | HEART RATE: 87 BPM | HEIGHT: 64 IN | WEIGHT: 219 LBS | DIASTOLIC BLOOD PRESSURE: 74 MMHG | SYSTOLIC BLOOD PRESSURE: 126 MMHG

## 2021-05-03 DIAGNOSIS — E11.42 DIABETIC PERIPHERAL NEUROPATHY (HCC): ICD-10-CM

## 2021-05-03 DIAGNOSIS — G57.12 MERALGIA PARESTHETICA OF LEFT SIDE: Primary | ICD-10-CM

## 2021-05-03 PROCEDURE — 99214 OFFICE O/P EST MOD 30 MIN: CPT | Performed by: NURSE PRACTITIONER

## 2021-05-03 RX ORDER — AMITRIPTYLINE HYDROCHLORIDE 25 MG/1
25 TABLET, FILM COATED ORAL NIGHTLY
Qty: 30 TABLET | Refills: 5 | Status: SHIPPED | OUTPATIENT
Start: 2021-05-03 | End: 2021-06-07

## 2021-05-03 NOTE — ASSESSMENT & PLAN NOTE
Diabetes is improving with treatment.   Continue current treatment regimen.  Diabetes will be reassessed 6 weeks.     Continue GBP     Add Elavil 25 mg PO QHS     Referred to pain management     MRI Brain due to worsened pain on the left side     F/U in 6 weeks or sooner if needed

## 2021-05-03 NOTE — PROGRESS NOTES
Subjective   Patient ID: Karli Ricardo is a 50 y.o. female     Chief Complaint   Patient presents with   • muscle twitching        History of Present Illness  50 y.o. female returns in follow up for Neuropathy. At last appointment on 4/5/21 continued Lyrica, increased OXC to 600 mg PO BID. Wear cock-up wrist splint at night and keep appointment with NS.     Stopped Oxcarbazepine, and Lyrica, PCP started her on  mg PO 4 times daily with mild improvement in pain. Stopped the OXC due to fatigue.     Wearing the wrist splints at night and a an ace bandage during the day intermittently with some improvement in her wrist and hand pain.     Problem History:    Pain first started approximately 3-4 months ago steadily worsening. Left side worse than right.    Tingling and pain in bilateral lower extremities, feels like they are on fire and having cramping. Does not feel like her arms are as bad as her legs, but they also have the sensation. Sensation has extetnded into her lower stomach.  Currently taking Lyrica 200 mg PO TID, Trileptal 300 PO BID.     Denies saddle anesthesia or bowel/bladder incontinence, headaches, fatigue, or difficulty ambulating. Having some diarrhea since starting the GBP.     Was having some electrical shock sensation and Trileptal has improved this but she had nerve pain.     Is having difficulty sleeping at night. Feeling more clumsy. Having muscle spasms in her calves and thighs.     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 Wears only one brace at night time. Feels it is thick and bulky and difficult to wear.      Last HA1C was 10.1 on 3/19/21.     Labs 12/3/20: CBC, CMP, Mag - [facility]   11/24/20: B12/Folate - [facility]     Has an appointment with NS for her back pain on 4/28/21    Medical Records Reviewed:   Dr. Chand OV 3/3/21: 51 y/o female with DM c/o increasing numbness and pains, electrical jolts in her hand and legs for about one year, painful numbness in her left  lateral thigh, low back pain. She reports restless sleep, daytime drowsiness. She has tried gabapentin 300 mg tid, now on Lyrica 150 mg tid, feels that it is not helping. Recent b12 was 497, folate, 5.97, A1C 14. She is now on insulin awaiting an appointment with endocrinology. Denies use of alcohol.    MRI Lumbar Spine 3/17/21: multilevel degenerative changes    EMG/NCS 3/31/21: + for diabetic neuropathy, mild to moderate CTS in bilateral wrists.     Past Medical History:   Diagnosis Date   • Acid reflux    • Arthritis    • Asthma    • Chronic bronchitis (CMS/[facility])    • Colon polyp 08/25/2015   • Diabetes (CMS/[facility])    • Diverticulitis    • Diverticulosis 2017   • Fracture     lateral left ankle   • Gallstones    • Gout    • Headache 2016   • Hypertension    • Ileitis 6/13/2019   • Low back pain 2008   • Neuromuscular disorder (CMS/[facility]) 2006   • Thyroid disease    • Type 2 diabetes mellitus (CMS/[facility])    • Visual impairment 2014     Family History   Problem Relation Age of Onset   • Arthritis Mother    • Mental illness Mother    • Migraines Mother    • Osteoporosis Mother    • Thyroid disease Mother    • Depression Mother    • Heart disease Mother    • Colon cancer Father         passed away at age 53   • Cancer Father    • Thyroid disease Sister      Social History     Socioeconomic History   • Marital status:      Spouse name: Not on file   • Number of children: Not on file   • Years of education: Not on file   • Highest education level: Not on file   Tobacco Use   • Smoking status: Current Every Day Smoker     Packs/day: 1.00     Years: 20.00     Pack years: 20.00     Types: Cigarettes   • Smokeless tobacco: Never Used   Vaping Use   • Vaping Use: Never used   Substance and Sexual Activity   • Alcohol use: No   • Drug use: No   • Sexual activity: Yes     Partners: Male     Birth control/protection: Surgical       Review of Systems    Objective     Vitals:    05/03/21 1444   BP: 126/74   Pulse: 87   Temp:  "96.9 °F (36.1 °C)   SpO2: 94%   Weight: 99.3 kg (219 lb)   Height: 162.6 cm (64.02\")       Neurologic Exam     Mental Status   Oriented to person, place, and time.   Attention: normal. Concentration: normal.   Speech: speech is normal   Level of consciousness: alert  Knowledge: good and consistent with education.   Able to name object. Able to read. Able to repeat. Able to write. Normal comprehension.     Cranial Nerves     CN II   Visual fields full to confrontation.   Visual acuity: normal  Right visual field deficit: none  Left visual field deficit: none     CN III, IV, VI   Pupils are equal, round, and reactive to light.  Extraocular motions are normal.   Right pupil: Shape: regular. Reactivity: brisk. Consensual response: intact.   Left pupil: Shape: regular. Reactivity: brisk. Consensual response: intact.   Nystagmus: none   Diplopia: none  Ophthalmoparesis: none  Upgaze: normal  Downgaze: normal  Conjugate gaze: present  Vestibulo-ocular reflex: present    CN V   Facial sensation intact.   Right corneal reflex: normal  Left corneal reflex: normal    CN VII   Right facial weakness: none  Left facial weakness: none    CN VIII   Hearing: intact    CN IX, X   Palate: symmetric  Right gag reflex: normal  Left gag reflex: normal    CN XI   Right sternocleidomastoid strength: normal  Left sternocleidomastoid strength: normal    CN XII   Tongue: not atrophic  Fasciculations: absent  Tongue deviation: none    Motor Exam   Muscle bulk: normal  Overall muscle tone: normal  Right arm tone: normal  Left arm tone: normal  Right leg tone: normal  Left leg tone: normal    Strength   Strength 5/5 throughout.     Sensory Exam   Proprioception normal.   Decreased sensation to pinprick and light touch from elbows up bilaterally, and knees up. Forearms and shins with normal sensation bilaterally      Gait, Coordination, and Reflexes     Gait  Gait: normal    Coordination   Finger to nose coordination: normal  Tandem walking " coordination: normal    Tremor   Resting tremor: absent  Intention tremor: absent  Action tremor: absent    Reflexes   Reflexes 2+ except as noted.       Physical Exam  Vitals and nursing note reviewed.   Constitutional:       Appearance: Normal appearance.   HENT:      Head: Normocephalic and atraumatic.   Eyes:      Extraocular Movements: Extraocular movements intact and EOM normal.      Pupils: Pupils are equal, round, and reactive to light.   Cardiovascular:      Rate and Rhythm: Normal rate.   Pulmonary:      Effort: Pulmonary effort is normal.   Skin:     General: Skin is warm and dry.   Neurological:      Mental Status: She is alert and oriented to person, place, and time.      Coordination: Finger-Nose-Finger Test normal.      Gait: Gait is intact. Tandem walk normal.      Deep Tendon Reflexes: Strength normal.   Psychiatric:         Mood and Affect: Mood normal.         Speech: Speech normal.         Behavior: Behavior normal.         Orders Only on 04/16/2021   Component Date Value Ref Range Status   • T4, Total 04/15/2021 6.90  4.50 - 11.70 mcg/dL Final         Assessment/Plan     Problem List Items Addressed This Visit        Neuro    Diabetic peripheral neuropathy (CMS/HCC)    Current Assessment & Plan     Diabetes is improving with treatment.   Continue current treatment regimen.  Diabetes will be reassessed 6 weeks.     Continue GBP     Add Elavil 25 mg PO QHS     Referred to pain management     MRI Brain due to worsened pain on the left side     F/U in 6 weeks or sooner if needed          Relevant Medications    metFORMIN ER (Glucophage XR) 500 MG 24 hr tablet    gabapentin (NEURONTIN) 600 MG tablet    Insulin Glargine, 1 Unit Dial, (Toujeo SoloStar) 300 UNIT/ML solution pen-injector injection    Insulin Lispro (HumaLOG KwikPen) 200 UNIT/ML solution pen-injector    amitriptyline (ELAVIL) 25 MG tablet    Other Relevant Orders    Ambulatory Referral to Pain Management      Other Visit Diagnoses      Meralgia paresthetica of left side    -  Primary    Relevant Orders    MRI Brain With & Without Contrast             Return in about 6 weeks (around 6/14/2021).

## 2021-05-26 ENCOUNTER — OFFICE VISIT (OUTPATIENT)
Dept: NEUROSURGERY | Facility: CLINIC | Age: 51
End: 2021-05-26

## 2021-05-26 VITALS
HEIGHT: 64 IN | SYSTOLIC BLOOD PRESSURE: 126 MMHG | TEMPERATURE: 97.6 F | WEIGHT: 220 LBS | BODY MASS INDEX: 37.56 KG/M2 | DIASTOLIC BLOOD PRESSURE: 82 MMHG

## 2021-05-26 DIAGNOSIS — Z79.4 TYPE 2 DIABETES MELLITUS WITH HYPERGLYCEMIA, WITH LONG-TERM CURRENT USE OF INSULIN (HCC): ICD-10-CM

## 2021-05-26 DIAGNOSIS — M51.36 DISC DEGENERATION, LUMBAR: Primary | ICD-10-CM

## 2021-05-26 DIAGNOSIS — G56.03 BILATERAL CARPAL TUNNEL SYNDROME: ICD-10-CM

## 2021-05-26 DIAGNOSIS — E11.65 TYPE 2 DIABETES MELLITUS WITH HYPERGLYCEMIA, WITH LONG-TERM CURRENT USE OF INSULIN (HCC): ICD-10-CM

## 2021-05-26 DIAGNOSIS — M54.9 MECHANICAL BACK PAIN: ICD-10-CM

## 2021-05-26 PROCEDURE — 99214 OFFICE O/P EST MOD 30 MIN: CPT | Performed by: PHYSICIAN ASSISTANT

## 2021-05-26 NOTE — PROGRESS NOTES
Patient: Karli Ricardo  : 1970  Chart #: [id number]    Date of Service: 2021    CHIEF COMPLAINT:   1.  Low back and bilateral thigh pain  2.  Bilateral hand pain and numbness    History of Present Illness Ms. Ricardo is a very kind 50-year-old woman who works on the [employer].  She has chronic back difficulties that have become progressive within the last 6 to 7 months.  Pain is largely in the low back but does extend into the thorax and in between the shoulder blades.  She has numbness and tingling as well as pain into the thighs.  Symptoms are worse with bending and turning over in bed.  She went to a chiropractor years ago and was worse after leaving.  She has pursued some physical therapy.  She also complains of achy pain and numbness involving both hands.  Sometimes this will extend into the forearm.  Symptoms are worse with overuse.  She is on computer all day.  She has tried wrist splints as well as wrapping her wrist.  She has been treated with Neurontin and Lyrica.  Neurontin seems to have helped a bit more.  Epidural spinal cord stimulator was recommended at 1 point for her back pain.  She is not enthusiastic about this.  She denies focal weakness or bowel bladder difficulties      Past Medical History:   Diagnosis Date   • Acid reflux    • Arthritis    • Asthma    • Chronic bronchitis (CMS/[facility])    • Colon polyp 2015   • Diabetes (CMS/[facility])    • Diverticulitis    • Diverticulosis    • Fracture     lateral left ankle   • Gallstones    • Gout    • Headache    • Hypertension    • Ileitis 2019   • Low back pain 2008   • Neuromuscular disorder (CMS/[facility])    • Thyroid disease    • Type 2 diabetes mellitus (CMS/[facility])    • Visual impairment          Current Outpatient Medications:   •  albuterol sulfate  (90 Base) MCG/ACT inhaler, Inhale 2 puffs Every 6 (Six) Hours As Needed for Wheezing or Shortness of Air., Disp: 1 inhaler, Rfl: 0  •  amitriptyline  (ELAVIL) 25 MG tablet, Take 1 tablet by mouth Every Night., Disp: 30 tablet, Rfl: 5  •  fluconazole (DIFLUCAN) 150 MG tablet, TAKE 1 TABLET BY MOUTH NOW, then repeat in 4 days if needed, Disp: 2 tablet, Rfl: 11  •  fluocinonide (LIDEX) 0.05 % ointment, Apply  topically to the appropriate area as directed 2 (Two) Times a Day., Disp: 120 g, Rfl: 1  •  fluticasone (Flonase) 50 MCG/ACT nasal spray, 2 sprays into the nostril(s) as directed by provider Daily., Disp: 1 bottle, Rfl: 5  •  gabapentin (NEURONTIN) 600 MG tablet, Take 1 tablet by mouth 4 (Four) Times a Day As Needed (neuropathic pain)., Disp: 120 tablet, Rfl: 1  •  Insulin Glargine, 1 Unit Dial, (Toujeo SoloStar) 300 UNIT/ML solution pen-injector injection, Inject 75 Units under the skin into the appropriate area as directed Every Night., Disp: 7.5 mL, Rfl: 5  •  Insulin Lispro (HumaLOG KwikPen) 200 UNIT/ML solution pen-injector, Inject 25-35 Units under the skin into the appropriate area as directed 3 (Three) Times a Day With Meals., Disp: 18 mL, Rfl: 3  •  Insulin Pen Needle (BD Pen Needle Oneyda U/F) 32G X 4 MM misc, 1 Units 4 (Four) Times a Day., Disp: 100 each, Rfl: 12  •  Levomefolate Glucosamine (MethylFolate) 400 MCG capsule, Take 400 mcg by mouth Daily. For low folic acid, Disp: 90 capsule, Rfl: 3  •  lisinopril (PRINIVIL,ZESTRIL) 5 MG tablet, Take 1 tablet by mouth Daily. Indications: diabetes, Disp: 90 tablet, Rfl: 3  •  Magnesium Oxide 400 (240 Mg) MG tablet, Take 1 tablet by mouth 2 (Two) Times a Day., Disp: 60 tablet, Rfl: 4  •  metFORMIN ER (Glucophage XR) 500 MG 24 hr tablet, Take 2 tablets by mouth 2 (Two) Times a Day. Indications: Type 2 Diabetes, Disp: 360 tablet, Rfl: 3  •  ONE TOUCH ULTRA TEST test strip, USE TO CHECK BLOOD GLUCOSE ONCE DAILY OR AS DIRECTED, Disp: , Rfl: 12  •  pantoprazole (PROTONIX) 40 MG EC tablet, Take 1 tablet by mouth Daily As Needed (heartburn/reflux)., Disp: 90 tablet, Rfl: 3  •  triamcinolone (KENALOG) 0.1 % cream, ,  "Disp: , Rfl:     Past Surgical History:   Procedure Laterality Date   •  SECTION     • CHOLECYSTECTOMY     • COLONOSCOPY  ,    DR LYLES,DR MARISCAL   • DILATATION AND CURETTAGE     • ENDOSCOPY      DR LOIDA ROBERTS   • FOOT SURGERY     • HYSTERECTOMY     • UPPER GASTROINTESTINAL ENDOSCOPY  2015       Social History     Socioeconomic History   • Marital status:      Spouse name: Not on file   • Number of children: Not on file   • Years of education: Not on file   • Highest education level: Not on file   Tobacco Use   • Smoking status: Current Every Day Smoker     Packs/day: 1.00     Years: 20.00     Pack years: 20.00     Types: Cigarettes   • Smokeless tobacco: Never Used   Vaping Use   • Vaping Use: Never used   Substance and Sexual Activity   • Alcohol use: No   • Drug use: No   • Sexual activity: Yes     Partners: Male     Birth control/protection: Surgical         Review of Systems   Constitutional: Positive for fatigue.   Respiratory: Positive for shortness of breath.    Cardiovascular: Positive for leg swelling.   Musculoskeletal: Positive for back pain.   Neurological: Positive for weakness and numbness.   Psychiatric/Behavioral: Positive for sleep disturbance. The patient is nervous/anxious.    All other systems reviewed and are negative.      Objective   Vital Signs: Blood pressure 126/82, temperature 97.6 °F (36.4 °C), height 162.6 cm (64.02\"), weight 99.8 kg (220 lb).  Physical Exam  Vitals and nursing note reviewed.   Constitutional:       General: She is not in acute distress.     Appearance: She is well-developed.   HENT:      Head: Normocephalic and atraumatic.   Eyes:      Pupils: Pupils are equal, round, and reactive to light.   Cardiovascular:      Heart sounds: Normal heart sounds.   Pulmonary:      Breath sounds: Normal breath sounds.   Psychiatric:         Behavior: Behavior normal.         Thought Content: Thought content normal.     Musculoskeletal:     Strength is " intact in upper and lower extremities to direct testing.     Station and gait are normal.     Straight leg raising is negative.   Neurologic:     Muscle tone is normal throughout.     Coordination is intact.     Deep tendon reflexes: Diminished in the lower extremities     Sensation is intact to light touch throughout.     Patient is oriented to person, place, and time.         Independent review of radiographic imaging: MRI of the lumbar spine demonstrates normal vertebral alignment.  There is disc desiccation most pronounced at L4 3 4 where there is some rightward disc bulge.  At L5-S1 there is some broad-based disc protrusion and central annular fissure.  No high-grade nerve root compromise or canal stenosis.  Also reviewed MRI with Dr. Orellana and discussed treatment plan.  I reviewed MRI with patient in the room.    EMG/NCV studies that were performed at orthopedics and sports medicine demonstrate mild to moderate right worse than left carpal tunnel syndrome.    Assessment/Plan   Diagnosis:   1.  Mechanical low back pain in the setting of degenerative disc disease  2.  Bilateral carpal tunnel syndrome      Medical Decision Making: Surgery is not indicated at this time for patient's lumbar spine. I think her pain is multifactorial. Likely related to degenerative changes in the spine, diabetes, as well as obesity.  We discussed options for her carpal tunnel syndrome including surgical intervention. She wants to discuss this with her .  She will follow up in our office if she decides to move forward with that.      Diagnoses and all orders for this visit:    1. Disc degeneration, lumbar (Primary)  -     Ambulatory Referral to Pain Management    2. Bilateral carpal tunnel syndrome  -     Ambulatory Referral to Pain Management    3. Mechanical back pain    4. Type 2 diabetes mellitus with hyperglycemia, with long-term current use of insulin (CMS/[facility])    5. BMI 37.0-37.9, adult                        Patient's  Body mass index is 37.74 kg/m². indicating that she is obese (BMI >30). Obesity-related health conditions include the following: hypertension, diabetes mellitus, dyslipidemias and GERD. Obesity is unchanged. BMI is is above average; BMI management plan is completed. We discussed portion control and increasing exercise..         Mary Kate Del Cid PA-C  Patient Care Team:  Lovely Loyd MD as PCP - General (Family Medicine)  Madina Valencia MD as Consulting Physician (Endocrinology)

## 2021-06-02 ENCOUNTER — HOSPITAL ENCOUNTER (OUTPATIENT)
Dept: MRI IMAGING | Facility: HOSPITAL | Age: 51
Discharge: HOME | End: 2021-06-02
Admitting: NURSE PRACTITIONER

## 2021-06-02 DIAGNOSIS — G57.12 MERALGIA PARESTHETICA OF LEFT SIDE: ICD-10-CM

## 2021-06-02 PROCEDURE — A9577 INJ MULTIHANCE: HCPCS | Performed by: NURSE PRACTITIONER

## 2021-06-02 PROCEDURE — 70553 MRI BRAIN STEM W/O & W/DYE: CPT

## 2021-06-02 PROCEDURE — 0 GADOBENATE DIMEGLUMINE 529 MG/ML SOLUTION: Performed by: NURSE PRACTITIONER

## 2021-06-02 RX ADMIN — GADOBENATE DIMEGLUMINE 15 ML: 529 INJECTION, SOLUTION INTRAVENOUS at 15:52

## 2021-06-03 ENCOUNTER — TELEPHONE (OUTPATIENT)
Dept: NEUROLOGY | Facility: CLINIC | Age: 51
End: 2021-06-03

## 2021-06-03 NOTE — TELEPHONE ENCOUNTER
Caller: SEB QUIJANO    Relationship: SELF    Best call back number: [phone]    Caller requesting test results: PATIENT    What test was performed: MRI BRAIN W WO CONTRAST    When was the test performed: 6/2/21    Where was the test performed: [facility]    Additional notes: SHE WOULD LIKE TO SPEAK WITH SARITA TO DISCUSS THESE RESULTS.

## 2021-06-03 NOTE — TELEPHONE ENCOUNTER
S/W [place] and got her appt bumped up to Monday @ 8:30am. She states if anyone cancels tomorrow or even this afternoon last minute to give her a call and notified I sure will!

## 2021-06-07 ENCOUNTER — TELEPHONE (OUTPATIENT)
Dept: INTERNAL MEDICINE | Facility: CLINIC | Age: 51
End: 2021-06-07

## 2021-06-07 ENCOUNTER — OFFICE VISIT (OUTPATIENT)
Dept: NEUROLOGY | Facility: CLINIC | Age: 51
End: 2021-06-07

## 2021-06-07 ENCOUNTER — LAB (OUTPATIENT)
Dept: LAB | Facility: HOSPITAL | Age: 51
End: 2021-06-07

## 2021-06-07 VITALS
BODY MASS INDEX: 37.22 KG/M2 | HEART RATE: 103 BPM | HEIGHT: 64 IN | OXYGEN SATURATION: 90 % | WEIGHT: 218 LBS | DIASTOLIC BLOOD PRESSURE: 78 MMHG | SYSTOLIC BLOOD PRESSURE: 124 MMHG

## 2021-06-07 DIAGNOSIS — R21 RASH: ICD-10-CM

## 2021-06-07 DIAGNOSIS — M54.42 CHRONIC BILATERAL LOW BACK PAIN WITH BILATERAL SCIATICA: ICD-10-CM

## 2021-06-07 DIAGNOSIS — G89.29 CHRONIC BILATERAL LOW BACK PAIN WITH BILATERAL SCIATICA: ICD-10-CM

## 2021-06-07 DIAGNOSIS — M54.41 CHRONIC BILATERAL LOW BACK PAIN WITH BILATERAL SCIATICA: ICD-10-CM

## 2021-06-07 DIAGNOSIS — E11.42 DIABETIC PERIPHERAL NEUROPATHY (HCC): Primary | ICD-10-CM

## 2021-06-07 DIAGNOSIS — E11.42 DIABETIC PERIPHERAL NEUROPATHY (HCC): ICD-10-CM

## 2021-06-07 DIAGNOSIS — R52 PAIN: ICD-10-CM

## 2021-06-07 LAB
CHROMATIN AB SERPL-ACNC: <10 IU/ML (ref 0–14)
ERYTHROCYTE [SEDIMENTATION RATE] IN BLOOD: 79 MM/HR (ref 0–30)

## 2021-06-07 PROCEDURE — 85670 THROMBIN TIME PLASMA: CPT

## 2021-06-07 PROCEDURE — 86235 NUCLEAR ANTIGEN ANTIBODY: CPT

## 2021-06-07 PROCEDURE — 85730 THROMBOPLASTIN TIME PARTIAL: CPT

## 2021-06-07 PROCEDURE — 85597 PHOSPHOLIPID PLTLT NEUTRALIZ: CPT

## 2021-06-07 PROCEDURE — 85598 HEXAGNAL PHOSPH PLTLT NEUTRL: CPT

## 2021-06-07 PROCEDURE — 82164 ANGIOTENSIN I ENZYME TEST: CPT

## 2021-06-07 PROCEDURE — 36415 COLL VENOUS BLD VENIPUNCTURE: CPT

## 2021-06-07 PROCEDURE — 85610 PROTHROMBIN TIME: CPT

## 2021-06-07 PROCEDURE — 99214 OFFICE O/P EST MOD 30 MIN: CPT | Performed by: NURSE PRACTITIONER

## 2021-06-07 PROCEDURE — 86147 CARDIOLIPIN ANTIBODY EA IG: CPT

## 2021-06-07 PROCEDURE — 85652 RBC SED RATE AUTOMATED: CPT

## 2021-06-07 PROCEDURE — 86146 BETA-2 GLYCOPROTEIN ANTIBODY: CPT

## 2021-06-07 PROCEDURE — 86038 ANTINUCLEAR ANTIBODIES: CPT

## 2021-06-07 PROCEDURE — 86225 DNA ANTIBODY NATIVE: CPT

## 2021-06-07 PROCEDURE — 86431 RHEUMATOID FACTOR QUANT: CPT

## 2021-06-07 PROCEDURE — 85613 RUSSELL VIPER VENOM DILUTED: CPT

## 2021-06-07 PROCEDURE — 85732 THROMBOPLASTIN TIME PARTIAL: CPT

## 2021-06-07 RX ORDER — DULOXETIN HYDROCHLORIDE 30 MG/1
30 CAPSULE, DELAYED RELEASE ORAL DAILY
Qty: 30 CAPSULE | Refills: 2 | Status: SHIPPED | OUTPATIENT
Start: 2021-06-07 | End: 2021-07-23 | Stop reason: SDUPTHER

## 2021-06-07 RX ORDER — TRAMADOL HYDROCHLORIDE 50 MG/1
50 TABLET ORAL EVERY 8 HOURS PRN
Qty: 90 TABLET | Refills: 1 | Status: SHIPPED | OUTPATIENT
Start: 2021-06-07 | End: 2021-10-25 | Stop reason: SDUPTHER

## 2021-06-07 NOTE — TELEPHONE ENCOUNTER
Caller: Karli Ricardo    Relationship: Self    Best call back number: [phone]    Medication needed:   Requested Prescriptions      No prescriptions requested or ordered in this encounter   TRAMADOL 50 MG TWICE A DAY     When do you need the refill by: ASAP     What additional details did the patient provide when requesting the medication: PATIENT IS OUT OF THE TRAMADOL THAT DR KAPADIA PRESCRIBED- ALSO PATIENT STATED THE 50 MG WAS NOT WORKING THAT WELL     Does the patient have less than a 3 day supply:  [x] Yes  [] No    What is the patient's preferred pharmacy: [id number] - [place], [place] - [address] - [phone] [facility] [phone]

## 2021-06-07 NOTE — ASSESSMENT & PLAN NOTE
Diabetes is unchanged.   Continue current treatment regimen.  Diabetes will be reassessed at next regualr follow up appointment .    Stop Elavil and start Cymbalta 30 mg PO daily.     Continue GBP     Placed new referral to [facility] pain and spine     F/U in 6 weeks or sooner if needed

## 2021-06-07 NOTE — PROGRESS NOTES
Subjective:     Patient ID: Karli Ricardo is a 50 y.o. female.    CC:   Chief Complaint   Patient presents with   • Results       HPI:   History of Present Illness   50 y.o. female returns in follow up for Neuropathy. At last appointment on 5/3/21 continued GBP, added Elavil 25 mg PO QHS. Ordered MRI Brain.     MRI Brain 6/2/21, my review of images, mild small vessel disease, otherwise unremarkable     Pain is an 8/10 with GBP and Elavil. Does not feel like going out and doing anything due to the discomfort.      Has red spots on the left hand and some on her back which started yesterday, they do not itch, hurt, or scale over.    Elavil helped her sleep on the first night, then has caused insomnia. Denies any improvement in pain with the addition of Elavil.    Trials: Lyrica, Trileptal, Elavil     NS evaluated the patient and no surgical indication is indicated. Patient was referred to Pain management and both NS order and Neurology were canceled due to having 2 different referrals. NS recommended surgical intervention for her CPS.      Problem History:     Pain steadily worsening. L>R     Tingling and pain in bilateral lower extremities, feels like they are on fire and having cramping. Does not feel like her arms are as bad as her legs, but they also have the sensation. Sensation has extetnded into her lower stomach.       Denies saddle anesthesia or bowel/bladder incontinence, headaches, fatigue, or difficulty ambulating.     Is having difficulty sleeping at night. Feeling more clumsy. Having muscle spasms in her calves and thighs.     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  Wearing the wrist splints at night and an ace bandage during the day intermittently with some improvement in her wrist and hand pain.      Last HA1C was 10.1 on 3/19/21.      Labs 12/3/20: CBC, CMP, Mag - [facility]   11/24/20: B12/Folate - [facility]      Medical Records Reviewed:   Dr. Chand OV 3/3/21: 49 y/o female with DM c/o  increasing numbness and pains, electrical jolts in her hand and legs for about one year, painful numbness in her left lateral thigh, low back pain. She reports restless sleep, daytime drowsiness. She has tried gabapentin 300 mg tid, now on Lyrica 150 mg tid, feels that it is not helping. Recent b12 was 497, folate, 5.97, A1C 14. She is now on insulin awaiting an appointment with endocrinology. Denies use of alcohol.     MRI Lumbar Spine 3/17/21: multilevel degenerative changes     EMG/NCS 3/31/21: + for diabetic neuropathy, mild to moderate CTS in bilateral wrists.     The following portions of the patient's history were reviewed and updated as appropriate: allergies, current medications, past family history, past medical history, past social history, past surgical history and problem list.    Past Medical History:   Diagnosis Date   • Acid reflux    • Arthritis    • Asthma    • Chronic bronchitis (CMS/[facility])    • Colon polyp 2015   • Diabetes (CMS/[facility])    • Diverticulitis    • Diverticulosis    • Fracture     lateral left ankle   • Gallstones    • Gout    • Headache    • Hypertension    • Ileitis 2019   • Low back pain    • Neuromuscular disorder (CMS/HCC)    • Thyroid disease    • Type 2 diabetes mellitus (CMS/[facility])    • Visual impairment        Past Surgical History:   Procedure Laterality Date   •  SECTION     • CHOLECYSTECTOMY     • COLONOSCOPY  ,    DR LYLES,DR MARISCAL   • DILATATION AND CURETTAGE     • ENDOSCOPY      DR LOIDA ROBERTS   • FOOT SURGERY     • HYSTERECTOMY     • UPPER GASTROINTESTINAL ENDOSCOPY  2015       Social History     Socioeconomic History   • Marital status:      Spouse name: Not on file   • Number of children: Not on file   • Years of education: Not on file   • Highest education level: Not on file   Tobacco Use   • Smoking status: Current Every Day Smoker     Packs/day: 1.00     Years: 20.00     Pack years: 20.00     Types:  "Cigarettes   • Smokeless tobacco: Never Used   Vaping Use   • Vaping Use: Never used   Substance and Sexual Activity   • Alcohol use: No   • Drug use: No   • Sexual activity: Yes     Partners: Male     Birth control/protection: Surgical       Family History   Problem Relation Age of Onset   • Arthritis Mother    • Mental illness Mother    • Migraines Mother    • Osteoporosis Mother    • Thyroid disease Mother    • Depression Mother    • Heart disease Mother    • Colon cancer Father         passed away at age 53   • Cancer Father    • Thyroid disease Sister         Objective:  /78   Pulse 103   Ht 162.6 cm (64.02\")   Wt 98.9 kg (218 lb)   SpO2 90%   BMI 37.40 kg/m²     Neurologic Exam     Mental Status   Oriented to person, place, and time.   Follows 3 step commands.   Attention: normal. Concentration: normal.   Speech: speech is normal   Level of consciousness: alert  Knowledge: good and consistent with education.   Able to name object. Able to read. Able to repeat. Able to write. Normal comprehension.     Cranial Nerves     CN II   Visual fields full to confrontation.   Visual acuity: normal  Right visual field deficit: none  Left visual field deficit: none     CN III, IV, VI   Pupils are equal, round, and reactive to light.  Extraocular motions are normal.   Right pupil: Shape: regular. Reactivity: brisk. Consensual response: intact.   Left pupil: Shape: regular. Reactivity: brisk. Consensual response: intact.   Nystagmus: none   Diplopia: none  Ophthalmoparesis: none  Upgaze: normal  Downgaze: normal  Conjugate gaze: present  Vestibulo-ocular reflex: present    CN V   Facial sensation intact.   Right corneal reflex: normal  Left corneal reflex: normal    CN VII   Right facial weakness: none  Left facial weakness: none    CN VIII   Hearing: intact    CN IX, X   Palate: symmetric  Right gag reflex: normal  Left gag reflex: normal    CN XI   Right sternocleidomastoid strength: normal  Left " sternocleidomastoid strength: normal    CN XII   Tongue: not atrophic  Fasciculations: absent  Tongue deviation: none    Motor Exam   Muscle bulk: normal  Overall muscle tone: normal  Right arm tone: normal  Left arm tone: normal  Right leg tone: normal  Left leg tone: normal    Strength   Strength 5/5 throughout.     Sensory Exam   Right arm light touch: decreased from elbow  Left arm light touch: decreased from elbow  Right leg light touch: decreased from knee  Left leg light touch: decreased from knee  Proprioception normal.     Gait, Coordination, and Reflexes     Gait  Gait: normal    Tremor   Resting tremor: absent  Intention tremor: absent  Action tremor: absent    Reflexes   Reflexes 2+ except as noted.       Physical Exam  Vitals and nursing note reviewed.   Constitutional:       Appearance: Normal appearance.   HENT:      Head: Normocephalic and atraumatic.   Eyes:      Extraocular Movements: Extraocular movements intact and EOM normal.      Pupils: Pupils are equal, round, and reactive to light.   Musculoskeletal:        Arms:    Skin:     General: Skin is warm and dry.   Neurological:      Mental Status: She is alert and oriented to person, place, and time.      Gait: Gait is intact.      Deep Tendon Reflexes: Strength normal.   Psychiatric:         Mood and Affect: Mood normal.         Speech: Speech normal.         Assessment/Plan:       Diagnoses and all orders for this visit:    1. Diabetic peripheral neuropathy (CMS/HCC)  Assessment & Plan:  Diabetes is unchanged.   Continue current treatment regimen.  Diabetes will be reassessed at next regualr follow up appointment .    Stop Elavil and start Cymbalta 30 mg PO daily.     Continue GBP     Placed new referral to [facility]     F/U in 6 weeks or sooner if needed     Orders:  -     DULoxetine (Cymbalta) 30 MG capsule; Take 1 capsule by mouth Daily.  Dispense: 30 capsule; Refill: 2  -     GIOVANI by IFA, Reflex 9-biomarkers profile;  Future  -     Angiotensin Converting Enzyme; Future  -     Lupus Anticoagulant Panel; Future  -     Rheumatoid Factor; Future  -     Sedimentation Rate; Future  -     Sjogren's Antibody, Anti-SS-A / -SS-B; Future  -     Ambulatory Referral to Pain Management    2. Rash  Assessment & Plan:  Will check autoimmune markers due to new mild rash on hands     Orders:  -     GIOVANI by IFA, Reflex 9-biomarkers profile; Future  -     Angiotensin Converting Enzyme; Future  -     Lupus Anticoagulant Panel; Future  -     Rheumatoid Factor; Future  -     Sedimentation Rate; Future  -     Sjogren's Antibody, Anti-SS-A / -SS-B; Future    3. Pain  -     GIOVANI by IFA, Reflex 9-biomarkers profile; Future  -     Angiotensin Converting Enzyme; Future  -     Lupus Anticoagulant Panel; Future  -     Rheumatoid Factor; Future  -     Sedimentation Rate; Future  -     Sjogren's Antibody, Anti-SS-A / -SS-B; Future    4. Chronic bilateral low back pain with bilateral sciatica           Reviewed medications, potential side effects and signs and symptoms to report. Discussed risk versus benefits of treatment plan with patient and/or family-including medications, labs and radiology that may be ordered. Addressed questions and concerns during visit. Patient and/or family verbalized understanding and agree with plan. Patient instructed to call the office with questions or concerns and report to ED with life-threatening symptoms.     AS THE PROVIDER, I PERSONALLY WORE PPE DURING ENTIRE FACE TO FACE ENCOUNTER IN CLINIC WITH THE PATIENT. PATIENT ALSO WORE PPE DURING ENTIRE FACE TO FACE ENCOUNTER EXCEPT FOR A MAX OF 30 SECONDS DURING NEUROLOGICAL EVALUATION OF CRANIAL NERVES AND THEN MASK WAS PLACED BACK OVER PATIENT FACE FOR REMAINDER OF VISIT. I WASHED MY HANDS BEFORE AND AFTER VISIT.    During this visit the following were done:  Labs Reviewed []    Labs Ordered [x]    Radiology Reports Reviewed []    Radiology Ordered []    PCP Records Reviewed []     Referring Provider Records Reviewed []    ER Records Reviewed []    Hospital Records Reviewed []    History Obtained From Family []    Radiology Images Reviewed [x]    Other Reviewed []    Records Requested []      Return in about 6 weeks (around 7/19/2021).      Ashia Rhodes, APRN  6/7/2021

## 2021-06-08 LAB
ACE SERPL-CCNC: 33 U/L (ref 14–82)
ENA SS-A AB SER-ACNC: <0.2 AI (ref 0–0.9)
ENA SS-B AB SER-ACNC: <0.2 AI (ref 0–0.9)

## 2021-06-09 ENCOUNTER — HOSPITAL ENCOUNTER (EMERGENCY)
Facility: HOSPITAL | Age: 51
Discharge: HOME | End: 2021-06-10
Attending: EMERGENCY MEDICINE | Admitting: EMERGENCY MEDICINE

## 2021-06-09 ENCOUNTER — APPOINTMENT (OUTPATIENT)
Dept: GENERAL RADIOLOGY | Facility: HOSPITAL | Age: 51
End: 2021-06-09

## 2021-06-09 ENCOUNTER — APPOINTMENT (OUTPATIENT)
Dept: CT IMAGING | Facility: HOSPITAL | Age: 51
End: 2021-06-09

## 2021-06-09 DIAGNOSIS — J18.9 COMMUNITY ACQUIRED PNEUMONIA OF RIGHT LOWER LOBE OF LUNG: Primary | ICD-10-CM

## 2021-06-09 LAB
ALBUMIN SERPL-MCNC: 3.7 G/DL (ref 3.5–5.2)
ALBUMIN/GLOB SERPL: 0.9 G/DL
ALP SERPL-CCNC: 124 U/L (ref 39–117)
ALT SERPL W P-5'-P-CCNC: 18 U/L (ref 1–33)
ANA HOMOGEN TITR SER: ABNORMAL {TITER}
ANA NUCLEOLAR TITR SER: ABNORMAL {TITER}
ANA TITR SER IF: POSITIVE {TITER}
ANION GAP SERPL CALCULATED.3IONS-SCNC: 15.9 MMOL/L (ref 5–15)
AST SERPL-CCNC: 21 U/L (ref 1–32)
BASOPHILS # BLD AUTO: 0.09 10*3/MM3 (ref 0–0.2)
BASOPHILS NFR BLD AUTO: 0.6 % (ref 0–1.5)
BILIRUB SERPL-MCNC: 0.8 MG/DL (ref 0–1.2)
BUN SERPL-MCNC: 8 MG/DL (ref 6–20)
BUN/CREAT SERPL: 10.4 (ref 7–25)
CALCIUM SPEC-SCNC: 9.3 MG/DL (ref 8.6–10.5)
CENTROMERE B AB SER-ACNC: <0.2 AI (ref 0–0.9)
CHLORIDE SERPL-SCNC: 92 MMOL/L (ref 98–107)
CHROMATIN AB SERPL-ACNC: <0.2 AI (ref 0–0.9)
CO2 SERPL-SCNC: 24.1 MMOL/L (ref 22–29)
CREAT SERPL-MCNC: 0.77 MG/DL (ref 0.57–1)
D DIMER PPP FEU-MCNC: 0.84 MCGFEU/ML (ref 0–0.57)
DEPRECATED RDW RBC AUTO: 39.7 FL (ref 37–54)
DSDNA AB SER-ACNC: 6 IU/ML (ref 0–9)
ENA JO1 AB SER-ACNC: <0.2 AI (ref 0–0.9)
ENA RNP AB SER-ACNC: 0.3 AI (ref 0–0.9)
ENA SCL70 AB SER-ACNC: 0.2 AI (ref 0–0.9)
ENA SM AB SER-ACNC: <0.2 AI (ref 0–0.9)
EOSINOPHIL # BLD AUTO: 0.05 10*3/MM3 (ref 0–0.4)
EOSINOPHIL NFR BLD AUTO: 0.3 % (ref 0.3–6.2)
ERYTHROCYTE [DISTWIDTH] IN BLOOD BY AUTOMATED COUNT: 13.3 % (ref 12.3–15.4)
FLUAV RNA RESP QL NAA+PROBE: NOT DETECTED
FLUBV RNA RESP QL NAA+PROBE: NOT DETECTED
GFR SERPL CREATININE-BSD FRML MDRD: 79 ML/MIN/1.73
GLOBULIN UR ELPH-MCNC: 4.2 GM/DL
GLUCOSE SERPL-MCNC: 219 MG/DL (ref 65–99)
HCT VFR BLD AUTO: 47.5 % (ref 34–46.6)
HGB BLD-MCNC: 16.1 G/DL (ref 12–15.9)
HOLD SPECIMEN: NORMAL
HOLD SPECIMEN: NORMAL
IMM GRANULOCYTES # BLD AUTO: 0.11 10*3/MM3 (ref 0–0.05)
IMM GRANULOCYTES NFR BLD AUTO: 0.7 % (ref 0–0.5)
LABORATORY COMMENT REPORT: ABNORMAL
LYMPHOCYTES # BLD AUTO: 1.89 10*3/MM3 (ref 0.7–3.1)
LYMPHOCYTES NFR BLD AUTO: 12.2 % (ref 19.6–45.3)
Lab: ABNORMAL
Lab: ABNORMAL
MCH RBC QN AUTO: 27.8 PG (ref 26.6–33)
MCHC RBC AUTO-ENTMCNC: 33.9 G/DL (ref 31.5–35.7)
MCV RBC AUTO: 81.9 FL (ref 79–97)
MONOCYTES # BLD AUTO: 1.51 10*3/MM3 (ref 0.1–0.9)
MONOCYTES NFR BLD AUTO: 9.8 % (ref 5–12)
NEUTROPHILS NFR BLD AUTO: 11.8 10*3/MM3 (ref 1.7–7)
NEUTROPHILS NFR BLD AUTO: 76.4 % (ref 42.7–76)
NRBC BLD AUTO-RTO: 0 /100 WBC (ref 0–0.2)
NT-PROBNP SERPL-MCNC: 147.8 PG/ML (ref 0–900)
PLATELET # BLD AUTO: 299 10*3/MM3 (ref 140–450)
PMV BLD AUTO: 9.8 FL (ref 6–12)
POTASSIUM SERPL-SCNC: 3.9 MMOL/L (ref 3.5–5.2)
PROT SERPL-MCNC: 7.9 G/DL (ref 6–8.5)
RBC # BLD AUTO: 5.8 10*6/MM3 (ref 3.77–5.28)
SARS-COV-2 RNA RESP QL NAA+PROBE: NOT DETECTED
SODIUM SERPL-SCNC: 132 MMOL/L (ref 136–145)
TROPONIN T SERPL-MCNC: <0.01 NG/ML (ref 0–0.03)
WBC # BLD AUTO: 15.45 10*3/MM3 (ref 3.4–10.8)
WHOLE BLOOD HOLD SPECIMEN: NORMAL

## 2021-06-09 PROCEDURE — 99284 EMERGENCY DEPT VISIT MOD MDM: CPT

## 2021-06-09 PROCEDURE — 85025 COMPLETE CBC W/AUTO DIFF WBC: CPT | Performed by: EMERGENCY MEDICINE

## 2021-06-09 PROCEDURE — 96375 TX/PRO/DX INJ NEW DRUG ADDON: CPT

## 2021-06-09 PROCEDURE — 85379 FIBRIN DEGRADATION QUANT: CPT | Performed by: EMERGENCY MEDICINE

## 2021-06-09 PROCEDURE — 83605 ASSAY OF LACTIC ACID: CPT | Performed by: EMERGENCY MEDICINE

## 2021-06-09 PROCEDURE — 96365 THER/PROPH/DIAG IV INF INIT: CPT

## 2021-06-09 PROCEDURE — 80053 COMPREHEN METABOLIC PANEL: CPT | Performed by: EMERGENCY MEDICINE

## 2021-06-09 PROCEDURE — 71045 X-RAY EXAM CHEST 1 VIEW: CPT

## 2021-06-09 PROCEDURE — 84145 PROCALCITONIN (PCT): CPT | Performed by: EMERGENCY MEDICINE

## 2021-06-09 PROCEDURE — 25010000002 MORPHINE PER 10 MG: Performed by: EMERGENCY MEDICINE

## 2021-06-09 PROCEDURE — 71275 CT ANGIOGRAPHY CHEST: CPT

## 2021-06-09 PROCEDURE — 25010000002 CEFTRIAXONE SODIUM-DEXTROSE 2-2.22 GM-%(50ML) RECONSTITUTED SOLUTION: Performed by: EMERGENCY MEDICINE

## 2021-06-09 PROCEDURE — 25010000002 IOPAMIDOL 61 % SOLUTION: Performed by: EMERGENCY MEDICINE

## 2021-06-09 PROCEDURE — 96376 TX/PRO/DX INJ SAME DRUG ADON: CPT

## 2021-06-09 PROCEDURE — 83880 ASSAY OF NATRIURETIC PEPTIDE: CPT | Performed by: EMERGENCY MEDICINE

## 2021-06-09 PROCEDURE — 87636 SARSCOV2 & INF A&B AMP PRB: CPT | Performed by: EMERGENCY MEDICINE

## 2021-06-09 PROCEDURE — 93005 ELECTROCARDIOGRAM TRACING: CPT | Performed by: EMERGENCY MEDICINE

## 2021-06-09 PROCEDURE — 84484 ASSAY OF TROPONIN QUANT: CPT | Performed by: EMERGENCY MEDICINE

## 2021-06-09 RX ORDER — MORPHINE SULFATE 4 MG/ML
4 INJECTION, SOLUTION INTRAMUSCULAR; INTRAVENOUS ONCE
Status: COMPLETED | OUTPATIENT
Start: 2021-06-09 | End: 2021-06-09

## 2021-06-09 RX ORDER — ACETAMINOPHEN 325 MG/1
975 TABLET ORAL ONCE
Status: COMPLETED | OUTPATIENT
Start: 2021-06-09 | End: 2021-06-09

## 2021-06-09 RX ORDER — SODIUM CHLORIDE 0.9 % (FLUSH) 0.9 %
10 SYRINGE (ML) INJECTION AS NEEDED
Status: DISCONTINUED | OUTPATIENT
Start: 2021-06-09 | End: 2021-06-10 | Stop reason: HOSPADM

## 2021-06-09 RX ORDER — CEFTRIAXONE 2 G/50ML
2 INJECTION, SOLUTION INTRAVENOUS ONCE
Status: COMPLETED | OUTPATIENT
Start: 2021-06-09 | End: 2021-06-10

## 2021-06-09 RX ADMIN — SODIUM CHLORIDE 1000 ML: 9 INJECTION, SOLUTION INTRAVENOUS at 21:33

## 2021-06-09 RX ADMIN — CEFTRIAXONE 2 G: 2 INJECTION, SOLUTION INTRAVENOUS at 23:59

## 2021-06-09 RX ADMIN — MORPHINE SULFATE 4 MG: 4 INJECTION, SOLUTION INTRAMUSCULAR; INTRAVENOUS at 23:59

## 2021-06-09 RX ADMIN — MORPHINE SULFATE 4 MG: 4 INJECTION, SOLUTION INTRAMUSCULAR; INTRAVENOUS at 21:41

## 2021-06-09 RX ADMIN — IOPAMIDOL 100 ML: 612 INJECTION, SOLUTION INTRAVENOUS at 22:36

## 2021-06-09 RX ADMIN — ACETAMINOPHEN 975 MG: 325 TABLET ORAL at 21:41

## 2021-06-10 VITALS
BODY MASS INDEX: 36.54 KG/M2 | OXYGEN SATURATION: 93 % | TEMPERATURE: 99.1 F | DIASTOLIC BLOOD PRESSURE: 69 MMHG | SYSTOLIC BLOOD PRESSURE: 104 MMHG | HEIGHT: 64 IN | HEART RATE: 76 BPM | RESPIRATION RATE: 19 BRPM | WEIGHT: 214 LBS

## 2021-06-10 LAB
D-LACTATE SERPL-SCNC: 1.1 MMOL/L (ref 0.5–2)
PROCALCITONIN SERPL-MCNC: 0.15 NG/ML (ref 0–0.25)

## 2021-06-10 PROCEDURE — 96367 TX/PROPH/DG ADDL SEQ IV INF: CPT

## 2021-06-10 PROCEDURE — 25010000002 AZITHROMYCIN 500 MG/250 ML: Performed by: EMERGENCY MEDICINE

## 2021-06-10 RX ORDER — ALBUTEROL SULFATE 90 UG/1
2 AEROSOL, METERED RESPIRATORY (INHALATION)
Qty: 18 G | Refills: 0 | Status: SHIPPED | OUTPATIENT
Start: 2021-06-10 | End: 2021-06-18 | Stop reason: SDUPTHER

## 2021-06-10 RX ORDER — AZITHROMYCIN 500 MG/1
500 TABLET, FILM COATED ORAL DAILY
Qty: 5 TABLET | Refills: 0 | Status: SHIPPED | OUTPATIENT
Start: 2021-06-10 | End: 2021-12-15

## 2021-06-10 RX ORDER — CEFDINIR 300 MG/1
300 CAPSULE ORAL 2 TIMES DAILY
Qty: 14 CAPSULE | Refills: 0 | Status: SHIPPED | OUTPATIENT
Start: 2021-06-10 | End: 2021-06-17

## 2021-06-10 RX ADMIN — AZITHROMYCIN 500 MG: 500 INJECTION, POWDER, LYOPHILIZED, FOR SOLUTION INTRAVENOUS at 00:30

## 2021-06-10 NOTE — ED NOTES
Called lab regarding lactic order, grey top sent with initial lab draw     Brandi Alfaro, RN  06/09/21 [id number]

## 2021-06-10 NOTE — ED PROVIDER NOTES
Subjective   50-year-old female presents to the ED with chief complaint of shortness of breath.  Patient was seen and evaluated in urgent care earlier today and diagnosed with a right lower lobe pneumonia and told to come to the ED if her symptoms not improve after she was given an antibiotic injection.  She states that she has been short of breath for 4 to 5 days.  Worsening since onset.  Has now developed a cough that is productive of thick green sputum over the last 24 hours.  Also complains of right-sided rib and back pain.  Rates it as severe.  Worse with breathing.  Does smoke cigarettes daily.  She also complains of chills, fatigue and low-grade fever.  No nausea vomiting diarrhea or abdominal pain.  No lightheadedness or dizziness.  No other complaints at this time.          Review of Systems   Constitutional: Positive for chills, fatigue and fever.   Respiratory: Positive for cough, shortness of breath and wheezing.    Musculoskeletal: Positive for back pain.   All other systems reviewed and are negative.      Past Medical History:   Diagnosis Date   • Acid reflux    • Arthritis    • Asthma    • Chronic bronchitis (CMS/HCC)    • Colon polyp 2015   • Diabetes (CMS/[facility])    • Diverticulitis    • Diverticulosis    • Fracture     lateral left ankle   • Gallstones    • Gout    • Headache    • Hypertension    • Ileitis 2019   • Low back pain 2008   • Neuromuscular disorder (CMS/HCC)    • Thyroid disease    • Type 2 diabetes mellitus (CMS/HCC)    • Visual impairment        Allergies   Allergen Reactions   • Latex Swelling     Happened once       Past Surgical History:   Procedure Laterality Date   •  SECTION     • CHOLECYSTECTOMY     • COLONOSCOPY  ,    DR LYLES,DR MARISCAL   • DILATATION AND CURETTAGE     • ENDOSCOPY      DR LOIDA ROBERTS   • FOOT SURGERY     • HYSTERECTOMY     • UPPER GASTROINTESTINAL ENDOSCOPY  2015       Family History   Problem Relation Age of  Onset   • Arthritis Mother    • Mental illness Mother    • Migraines Mother    • Osteoporosis Mother    • Thyroid disease Mother    • Depression Mother    • Heart disease Mother    • Colon cancer Father         passed away at age 53   • Cancer Father    • Thyroid disease Sister        Social History     Socioeconomic History   • Marital status:      Spouse name: Not on file   • Number of children: Not on file   • Years of education: Not on file   • Highest education level: Not on file   Tobacco Use   • Smoking status: Current Every Day Smoker     Packs/day: 1.00     Years: 20.00     Pack years: 20.00     Types: Cigarettes   • Smokeless tobacco: Never Used   Vaping Use   • Vaping Use: Never used   Substance and Sexual Activity   • Alcohol use: No   • Drug use: No   • Sexual activity: Yes     Partners: Male     Birth control/protection: Surgical           Objective   Physical Exam  Vitals and nursing note reviewed.   Constitutional:       General: She is not in acute distress.     Appearance: She is well-developed. She is not diaphoretic.   HENT:      Head: Normocephalic and atraumatic.      Nose: Nose normal.   Eyes:      Conjunctiva/sclera: Conjunctivae normal.   Cardiovascular:      Rate and Rhythm: Normal rate and regular rhythm.   Pulmonary:      Effort: Tachypnea present. No respiratory distress.      Breath sounds: Normal breath sounds.      Comments: Mild tachypnea.  Coarse breath sounds bilaterally.  Abdominal:      General: There is no distension.      Palpations: Abdomen is soft.      Tenderness: There is no abdominal tenderness. There is no guarding.   Musculoskeletal:         General: No deformity.   Neurological:      Mental Status: She is alert and oriented to person, place, and time.      Cranial Nerves: No cranial nerve deficit.         Procedures           ED Course      PULSE OXIMETRY INTERPRETATION  Patient had a pulse ox of 94 % on room air. This is a normal pulse oximetry reading.      SMOKING CESSATION COUNSELING  Greater than 3 minutes of smoking cessation was discussed with this patient.  Risks and chronic conditions associated with continued smoking were discussed.  Benefits of smoking cessation were also discussed.                      EKG interpreted by me.  Sinus rhythm.  Tachycardic.  Rate of 103.  Nonspecific Q wave.  No obvious ST or T wave abnormalities.  Abnormal EKG        MDM  Well-appearing nontoxic 50-year-old female with cough shortness of breath and fatigue.  Diagnosed with right lower lobe pneumonia earlier today.  Chest x-ray does show right-sided pneumonia.  D-dimer slightly elevated.  CT PE was negative for pulmonary embolism does show right lower lobe consolidation.  No recent hospitalization.  Minimal elevation of white blood cell count.  Procalcitonin lactic acid appropriate.  Given a dose of azithromycin and Rocephin here in the ED.  Shared decision making with the patient and she prefers to try outpatient therapy.  Given the reassuring work-up today I feel that this is appropriate.  Will discharge with appropriate antibiotics.  Strict return precaution given.  Patient agreeable to this plan.      Final diagnoses:   Community acquired pneumonia of right lower lobe of lung       ED Disposition  ED Disposition     ED Disposition Condition Comment    Discharge Stable           Lovely Loyd MD  [address] [place] [id number] [phone]               Medication List      New Prescriptions    azithromycin 500 MG tablet  Commonly known as: ZITHROMAX  Take 1 tablet by mouth Daily.     cefdinir 300 MG capsule  Commonly known as: OMNICEF  Take 1 capsule by mouth 2 (Two) Times a Day for 7 days.        Changed    * albuterol sulfate  (90 Base) MCG/ACT inhaler  Commonly known as: PROVENTIL HFA;VENTOLIN HFA;PROAIR HFA  Inhale 2 puffs Every 6 (Six) Hours As Needed for Wheezing or Shortness of Air.  What changed: Another medication with the same name  was added. Make sure you understand how and when to take each.     * albuterol sulfate  (90 Base) MCG/ACT inhaler  Commonly known as: PROVENTIL HFA;VENTOLIN HFA;PROAIR HFA  Inhale 2 puffs 4 (Four) Times a Day.  What changed: You were already taking a medication with the same name, and this prescription was added. Make sure you understand how and when to take each.         * This list has 2 medication(s) that are the same as other medications prescribed for you. Read the directions carefully, and ask your doctor or other care provider to review them with you.               Where to Get Your Medications      These medications were sent to [id number] - [place], [place] - [address] - [phone]  - [phone] FX  [address] [place] [address]    Phone: [phone]   · albuterol sulfate  (90 Base) MCG/ACT inhaler  · azithromycin 500 MG tablet  · cefdinir 300 MG capsule          Khoa Osullivan, DO  06/10/21 0131

## 2021-06-16 ENCOUNTER — TELEPHONE (OUTPATIENT)
Dept: NEUROLOGY | Facility: CLINIC | Age: 51
End: 2021-06-16

## 2021-06-16 DIAGNOSIS — R70.0 ELEVATED SED RATE: ICD-10-CM

## 2021-06-16 DIAGNOSIS — G57.12 MERALGIA PARESTHETICA OF LEFT SIDE: ICD-10-CM

## 2021-06-16 DIAGNOSIS — R52 PAIN: Primary | ICD-10-CM

## 2021-06-16 NOTE — TELEPHONE ENCOUNTER
Caller: Karli Ricardo    Relationship: Self    Best call back number:[phone]    Caller requesting test results: PT    What test was performed:  LAB RESULTS     When was the test performed:06/09/2021    Where was the test performed:  [facility]    Additional notes: [facility]

## 2021-06-16 NOTE — TELEPHONE ENCOUNTER
I am still awaiting some results however, you do have some markers indicative of some potential Rheumatological conditions and your inflammatory marker was elevated so I am going to place a referral to Rheumatology.

## 2021-06-16 NOTE — TELEPHONE ENCOUNTER
Left detailed vm for [place] relaying Ashia's results and recommendations thus far. Office # given.

## 2021-06-17 LAB
APTT HEX PL PPP: 0 SEC
APTT IMM NP PPP: ABNORMAL S
APTT PPP 1:1 SALINE: ABNORMAL S
APTT PPP: 30.9 SEC
B2 GLYCOPROT1 IGA SER-ACNC: <10 SAU
B2 GLYCOPROT1 IGG SER-ACNC: <10 SGU
B2 GLYCOPROT1 IGM SER-ACNC: 13 SMU
CARDIOLIPIN IGG SER IA-ACNC: <10 GPL
CARDIOLIPIN IGM SER IA-ACNC: 39 MPL
CONFIRM APTT: 3.4 SEC
CONFIRM DRVVT: 32.9 SEC
DRVVT SCREEN TO CONFIRM RATIO: 1.5 RATIO
INR PPP: 1.1 RATIO
LABORATORY COMMENT REPORT: ABNORMAL
PROTHROMBIN TIME: 11.5 SEC
SCREEN DRVVT: 54 SEC
THROMBIN TIME: 14.9 SEC

## 2021-06-18 ENCOUNTER — TELEPHONE (OUTPATIENT)
Dept: NEUROLOGY | Facility: CLINIC | Age: 51
End: 2021-06-18

## 2021-06-18 ENCOUNTER — OFFICE VISIT (OUTPATIENT)
Dept: INTERNAL MEDICINE | Facility: CLINIC | Age: 51
End: 2021-06-18

## 2021-06-18 VITALS
TEMPERATURE: 97.1 F | HEART RATE: 98 BPM | DIASTOLIC BLOOD PRESSURE: 83 MMHG | BODY MASS INDEX: 36.28 KG/M2 | HEIGHT: 64 IN | WEIGHT: 212.5 LBS | RESPIRATION RATE: 18 BRPM | SYSTOLIC BLOOD PRESSURE: 144 MMHG | OXYGEN SATURATION: 98 %

## 2021-06-18 DIAGNOSIS — R76.8 ANA POSITIVE: ICD-10-CM

## 2021-06-18 DIAGNOSIS — R29.898 MUSCLE FUNCTION LOSS: ICD-10-CM

## 2021-06-18 DIAGNOSIS — J18.9 PNEUMONIA OF RIGHT LOWER LOBE DUE TO INFECTIOUS ORGANISM: Primary | ICD-10-CM

## 2021-06-18 DIAGNOSIS — Z79.4 TYPE 2 DIABETES MELLITUS WITH HYPERGLYCEMIA, WITH LONG-TERM CURRENT USE OF INSULIN (HCC): ICD-10-CM

## 2021-06-18 DIAGNOSIS — E11.65 TYPE 2 DIABETES MELLITUS WITH HYPERGLYCEMIA, WITH LONG-TERM CURRENT USE OF INSULIN (HCC): ICD-10-CM

## 2021-06-18 PROCEDURE — 99214 OFFICE O/P EST MOD 30 MIN: CPT | Performed by: FAMILY MEDICINE

## 2021-06-18 RX ORDER — ALBUTEROL SULFATE 90 UG/1
2 AEROSOL, METERED RESPIRATORY (INHALATION) EVERY 6 HOURS PRN
Qty: 18 G | Refills: 11 | Status: SHIPPED | OUTPATIENT
Start: 2021-06-18 | End: 2022-08-18 | Stop reason: SDUPTHER

## 2021-06-18 NOTE — TELEPHONE ENCOUNTER
----- Message from DAFNE Dia sent at 6/18/2021  9:01 AM EDT -----  Please let Georgia know that her Lupus panel had some abnormalities and I would like her to see a Rheumatologist. I placed the referral 2 days ago.   Thanks!

## 2021-06-18 NOTE — TELEPHONE ENCOUNTER
Notified Andrzejogia who states understanding and let her know Rheumatology should be calling her to schedule. Thanks!

## 2021-06-18 NOTE — PATIENT INSTRUCTIONS
Community-Acquired Pneumonia, Adult  Pneumonia is a lung infection that causes inflammation and the buildup of mucus and fluids in the lungs. This may cause coughing and difficulty breathing. Community-acquired pneumonia is pneumonia that develops in people who are not, and have not recently been, in a hospital or other health care facility.  Usually, pneumonia develops as a result of an illness that is caused by a virus, such as the common cold and the flu (influenza). It can also be caused by bacteria or fungi. While the common cold and influenza can pass from person to person (are contagious), pneumonia itself is not considered contagious.  What are the causes?  This condition may be caused by:  · Viruses.  · Bacteria.  · Fungi, such as molds or mushrooms.  What increases the risk?  The following factors may make you more likely to develop this condition:  · Having certain medical conditions, such as:  ? A long-term (chronic) disease, which may include chronic obstructive pulmonary disease (COPD), asthma, heart failure, cystic fibrosis, diabetes, kidney disease, sickle cell disease, and human immunodeficiency virus (HIV).  ? A condition that increases the risk of breathing in (aspirating) mucus and other fluids from your mouth and nose.  ? A weakened body defense system (immune system).  · Having had your spleen removed (splenectomy). The spleen is the organ that helps fight germs and infections.  · Not cleaning your teeth and gums well (poor dental hygiene).  · Using tobacco products.  · Traveling to places where germs that cause pneumonia are present.  · Being near certain animals, or animal habitats, that have germs that cause pneumonia.  · Being older than 65 years of age.  What are the signs or symptoms?  Symptoms of this condition include:  · A dry cough or a wet (productive) cough.  · A fever.  · Sweating or chills.  · Chest pain, especially when breathing deeply or coughing.  · Fast breathing, difficulty  breathing, or shortness of breath.  · Tiredness (fatigue).  · Muscle aches.  How is this diagnosed?  This condition may be diagnosed based on your medical history or a physical exam. You may also have tests, including:  · Chest X-rays.  · Tests of the level of oxygen and other gases in your blood.  · Tests of:  ? Your blood.  ? Mucus from your lungs (sputum).  ? Fluid around your lungs (pleural fluid).  ? Your urine.  If your pneumonia is severe, other tests may be done to learn more about the cause.  How is this treated?  Treatment for this condition depends on many factors, such as the cause of your pneumonia, your medicines, and other medical conditions that you have.  For most adults, pneumonia may be treated at home. In some cases, treatment must happen in a hospital and may include:  · Medicines that are given by mouth (orally) or through an IV, including:  ? Antibiotic medicines, if bacteria caused the pneumonia.  ? Medicines that kill viruses (antiviral medicines), if a virus caused the pneumonia.  · Oxygen therapy.  Severe pneumonia, although rare, may require the following treatments:  · Mechanical ventilation.This procedure uses a machine to help you breathe if you cannot breathe well on your own or maintain a safe level of blood oxygen.  · Thoracentesis. This procedure removes any buildup of pleural fluid to help with breathing.  Follow these instructions at home:    Medicines  · Take over-the-counter and prescription medicines only as told by your health care provider.  · Take cough medicine only if you have trouble sleeping. Cough medicine can prevent your body from removing mucus from your lungs.  · If you were prescribed an antibiotic medicine, take it as told by your health care provider. Do not stop taking the antibiotic even if you start to feel better.  Lifestyle         · Do not drink alcohol.  · Do not use any products that contain nicotine or tobacco, such as cigarettes, e-cigarettes, and  chewing tobacco. If you need help quitting, ask your health care provider.  · Eat a healthy diet. This includes plenty of vegetables, fruits, whole grains, low-fat dairy products, and lean protein.  General instructions  · Rest a lot and get at least 8 hours of sleep each night.  · Sleep in a partly upright position at night. Place a few pillows under your head or sleep in a reclining chair.  · Return to your normal activities as told by your health care provider. Ask your health care provider what activities are safe for you.  · Drink enough fluid to keep your urine pale yellow. This helps to thin the mucus in your lungs.  · If your throat is sore, gargle with a salt-water mixture 3-4 times a day or as needed. To make a salt-water mixture, completely dissolve ½-1 tsp (3-6 g) of salt in 1 cup (237 mL) of warm water.  · Keep all follow-up visits as told by your health care provider. This is important.  How is this prevented?  You can lower your risk of developing community-acquired pneumonia by:  · Getting the pneumonia vaccine. There are different types and schedules of pneumonia vaccines. Ask your health care provider which option is best for you. Consider getting the pneumonia vaccine if:  ? You are older than 65 years of age.  ? You are 19-65 years of age and are receiving cancer treatment, have chronic lung disease, or have other medical conditions that affect your immune system. Ask your health care provider if this applies to you.  · Getting your influenza vaccine every year. Ask your health care provider which type of vaccine is best for you.  · Getting regular dental checkups.  · Washing your hands often with soap and water for at least 20 seconds. If soap and water are not available, use hand .  Contact a health care provider if you have:  · A fever.  · Trouble sleeping because you cannot control your cough with cough medicine.  Get help right away if:  · Your shortness of breath becomes  worse.  · Your chest pain increases.  · Your sickness becomes worse, especially if you are an older adult or have a weak immune system.  · You cough up blood.  These symptoms may represent a serious problem that is an emergency. Do not wait to see if the symptoms will go away. Get medical help right away. Call your local emergency services (911 in the [place] Do not drive yourself to the hospital.  Summary  · Pneumonia is an infection of the lungs.  · Community-acquired pneumonia develops in people who have not been in the hospital. It can be caused by bacteria, viruses, or fungi.  · This condition may be treated with antibiotics or antiviral medicines.  · Severe pneumonia may require a hospital stay and treatment to help with breathing.  This information is not intended to replace advice given to you by your health care provider. Make sure you discuss any questions you have with your health care provider.  Document Revised: 09/29/2020 Document Reviewed: 09/29/2020  [employer] Patient Education © 2021 [employer]      For more information:     Quit Now [place]  1-800-QUIT-NOW  https://[place].quitlogix.org/en-[place]/  Steps to Quit Smoking    Smoking tobacco can be harmful to your health and can affect almost every organ in your body. Smoking puts you, and those around you, at risk for developing many serious chronic diseases. Quitting smoking is difficult, but it is one of the best things that you can do for your health. It is never too late to quit.  What are the benefits of quitting smoking?  When you quit smoking, you lower your risk of developing serious diseases and conditions, such as:  · Lung cancer or lung disease, such as COPD.  · Heart disease.  · Stroke.  · Heart attack.  · Infertility.  · Osteoporosis and bone fractures.  Additionally, symptoms such as coughing, wheezing, and shortness of breath may get better when you quit. You may also find that you get sick less often because your body is stronger at  fighting off colds and infections. If you are pregnant, quitting smoking can help to reduce your chances of having a baby of low birth weight.  How do I get ready to quit?  When you decide to quit smoking, create a plan to make sure that you are successful. Before you quit:  · Pick a date to quit. Set a date within the next two weeks to give you time to prepare.  · Write down the reasons why you are quitting. Keep this list in places where you will see it often, such as on your bathroom mirror or in your car or wallet.  · Identify the people, places, things, and activities that make you want to smoke (triggers) and avoid them. Make sure to take these actions:  ? Throw away all cigarettes at home, at work, and in your car.  ? Throw away smoking accessories, such as ashtrays and lighters.  ? Clean your car and make sure to empty the ashtray.  ? Clean your home, including curtains and carpets.  · Tell your family, friends, and coworkers that you are quitting. Support from your loved ones can make quitting easier.  · Talk with your health care provider about your options for quitting smoking.  · Find out what treatment options are covered by your health insurance.  What strategies can I use to quit smoking?  Talk with your healthcare provider about different strategies to quit smoking. Some strategies include:  · Quitting smoking altogether instead of gradually lessening how much you smoke over a period of time. Research shows that quitting “cold turkey” is more successful than gradually quitting.  · Attending in-person counseling to help you build problem-solving skills. You are more likely to have success in quitting if you attend several counseling sessions. Even short sessions of 10 minutes can be effective.  · Finding resources and support systems that can help you to quit smoking and remain smoke-free after you quit. These resources are most helpful when you use them often. They can include:  ? Online chats with a  counselor.  ? Telephone quitlines.  ? Printed self-help materials.  ? Support groups or group counseling.  ? Text messaging programs.  ? Mobile phone applications.  · Taking medicines to help you quit smoking. (If you are pregnant or breastfeeding, talk with your health care provider first.) Some medicines contain nicotine and some do not. Both types of medicines help with cravings, but the medicines that include nicotine help to relieve withdrawal symptoms. Your health care provider may recommend:  ? Nicotine patches, gum, or lozenges.  ? Nicotine inhalers or sprays.  ? Non-nicotine medicine that is taken by mouth.  Talk with your health care provider about combining strategies, such as taking medicines while you are also receiving in-person counseling. Using these two strategies together makes you more likely to succeed in quitting than if you used either strategy on its own.  If you are pregnant or breastfeeding, talk with your health care provider about finding counseling or other support strategies to quit smoking. Do not take medicine to help you quit smoking unless told to do so by your health care provider.  What things can I do to make it easier to quit?  Quitting smoking might feel overwhelming at first, but there is a lot that you can do to make it easier. Take these important actions:  · Reach out to your family and friends and ask that they support and encourage you during this time. Call telephone quitlines, reach out to support groups, or work with a counselor for support.  · Ask people who smoke to avoid smoking around you.  · Avoid places that trigger you to smoke, such as bars, parties, or smoke-break areas at work.  · Spend time around people who do not smoke.  · Lessen stress in your life, because stress can be a smoking trigger for some people. To lessen stress, try:  ? Exercising regularly.  ? Deep-breathing exercises.  ? Yoga.  ? Meditating.  ? Performing a body scan. This involves closing  your eyes, scanning your body from head to toe, and noticing which parts of your body are particularly tense. Purposefully relax the muscles in those areas.  · Download or purchase mobile phone or tablet apps (applications) that can help you stick to your quit plan by providing reminders, tips, and encouragement. There are many free apps, such as QuitGuide from the CDC (Centers for Disease Control and Prevention). You can find other support for quitting smoking (smoking cessation) through smokefree.gov and other websites.  How will I feel when I quit smoking?  Within the first 24 hours of quitting smoking, you may start to feel some withdrawal symptoms. These symptoms are usually most noticeable 2-3 days after quitting, but they usually do not last beyond 2-3 weeks. Changes or symptoms that you might experience include:  · Mood swings.  · Restlessness, anxiety, or irritation.  · Difficulty concentrating.  · Dizziness.  · Strong cravings for sugary foods in addition to nicotine.  · Mild weight gain.  · Constipation.  · Nausea.  · Coughing or a sore throat.  · Changes in how your medicines work in your body.  · A depressed mood.  · Difficulty sleeping (insomnia).  After the first 2-3 weeks of quitting, you may start to notice more positive results, such as:  · Improved sense of smell and taste.  · Decreased coughing and sore throat.  · Slower heart rate.  · Lower blood pressure.  · Clearer skin.  · The ability to breathe more easily.  · Fewer sick days.  Quitting smoking is very challenging for most people. Do not get discouraged if you are not successful the first time. Some people need to make many attempts to quit before they achieve long-term success. Do your best to stick to your quit plan, and talk with your health care provider if you have any questions or concerns.    This information is not intended to replace advice given to you by your health care provider. Make sure you discuss any questions you have with  your health care provider.  Document Released: 12/12/2002 Document Revised: 08/15/2017 Document Reviewed: 05/03/2016  [employer] Patient Education © 2017 [employer].

## 2021-06-18 NOTE — PROGRESS NOTES
"Subjective    [place] Shameka Ricardo is a 50 y.o. female here for:  Chief Complaint   Patient presents with   • Hospital Follow Up Visit     Hospitalized for pneumonia. Pt states she is feeling better, but is still weak and tired.        History per MA reviewed.    Patient was not admitted for pneumonia but was given option, to stay or discharge. She elected to go home and admits she probably should have stayed for admission. Feels better but still not 100%. Still very tired. Still some pain in back but not as bad as it was. Finishing up antibiotics. Coughing out dark sputum.         The following portions of the patient's history were reviewed and updated as appropriate: allergies, current medications, past family history, past medical history, past social history, past surgical history and problem list.    Review of Systems   Constitutional: Positive for fatigue.   Respiratory: Positive for cough.        Visit Vitals  /83   Pulse 98   Temp 97.1 °F (36.2 °C) (Temporal)   Resp 18   Ht 162.6 cm (64.02\")   Wt 96.4 kg (212 lb 8 oz)   SpO2 98%   BMI 36.46 kg/m²         Objective   Physical Exam  Vitals and nursing note reviewed.   Constitutional:       General: She is not in acute distress.     Appearance: Normal appearance. She is well-developed and well-groomed. She is not ill-appearing, toxic-appearing or diaphoretic.      Interventions: Face mask in place.   HENT:      Head: Normocephalic and atraumatic.      Right Ear: Hearing normal.      Left Ear: Hearing normal.   Eyes:      General: Lids are normal. No scleral icterus.     Extraocular Movements: Extraocular movements intact.   Neck:      Trachea: Phonation normal.   Pulmonary:      Effort: Pulmonary effort is normal.      Breath sounds: No stridor. Examination of the right-lower field reveals wheezing. Wheezing present. No rhonchi or rales.   Musculoskeletal:      Cervical back: Neck supple.   Skin:     Coloration: Skin is not jaundiced or pale. "   Neurological:      Mental Status: She is alert and oriented to person, place, and time.   Psychiatric:         Attention and Perception: Attention and perception normal.         Mood and Affect: Mood and affect normal.         Speech: Speech normal.         Behavior: Behavior normal. Behavior is cooperative.         Thought Content: Thought content normal.         Cognition and Memory: Cognition and memory normal.         Judgment: Judgment normal.         For medical decision making review of the following was required:  CT Chest Pulmonary Embolism (06/09/2021 22:35)  XR Chest 1 View (06/09/2021 22:13)  Lab Results   Component Value Date    WBC 15.45 (H) 06/09/2021    HGB 16.1 (H) 06/09/2021    HCT 47.5 (H) 06/09/2021    MCV 81.9 06/09/2021     06/09/2021       Assessment/Plan     Problem List Items Addressed This Visit     None      Visit Diagnoses     Pneumonia of right lower lobe due to infectious organism    -  Primary    Relevant Medications    albuterol sulfate  (90 Base) MCG/ACT inhaler    guaiFENesin (Mucinex) 600 MG 12 hr tablet    GIOVANI positive        Muscle function loss              · Complete antibiotic course. Consider adding Mucinex to help break up secrtions. Discouraged cough suppressants as expectoration needed. Patient will have forms for work for fmla to complete. Was off 6-10 through (planned) 6/21 return to work 6/22  · Discussed briefly autoimmune diseases, abnormal labs. Referral was placed by neurology for rheumatology.  · Follow up with endocrinology regarding diabetes mellitus         Lovely Loyd MD

## 2021-07-01 ENCOUNTER — TELEPHONE (OUTPATIENT)
Dept: NEUROLOGY | Facility: CLINIC | Age: 51
End: 2021-07-01

## 2021-07-01 NOTE — TELEPHONE ENCOUNTER
Provider: ESTER  Caller: PT  Relationship to Patient: SELF  Pharmacy: N/A  Phone Number: [phone]  Reason for Call: PT WAS REFERRED TO PAIN MANAGEMENT, HOWEVER PROVIDER IS NOT IN PT INSURANCE NETWORK; CAN THE PAIN MANAGEMENT REFERRAL BE SENT TO SOMEONE IN HER INSURANCE NETWORK?    PLEASE CALL & ADVISE.    THANK YOU.

## 2021-07-02 NOTE — TELEPHONE ENCOUNTER
Left detailed vm for Georgia letting her know we did receive the denial letter from Dr.James Bernal's office and sent her referral to [facility] instead on 6/7/2021. Provided her with their phone # @[phone]

## 2021-07-08 ENCOUNTER — TELEPHONE (OUTPATIENT)
Dept: INTERNAL MEDICINE | Facility: CLINIC | Age: 51
End: 2021-07-08

## 2021-07-08 NOTE — TELEPHONE ENCOUNTER
Caller: Karli Ricardo    Relationship: Self    Best call back number: [phone]    What is the best time to reach you: ANYTIME     Who are you requesting to speak with (clinical staff, provider,  specific staff member): CLINICAL STAFF    What was the call regarding: PATIENT STATES THAT SHE DROPPED SOME PAPERWORK FOR FMLA OFF A COUPLE WEEKS AGO. SHE IS WANTING TO KNOW IF THIS HAS BEEN COMPLETED.     Do you require a callback: YES

## 2021-07-08 NOTE — TELEPHONE ENCOUNTER
PATIENT IS NEEDING A STATUS UPDATE ON THIS AS SOON AS POSSIBLE. PATIENT REPORTS THIS PAPERWORK WAS TURNED IN AT LEAST 2 WEEKS AGO. PLEASE RETURN CALL [phone]

## 2021-07-23 ENCOUNTER — OFFICE VISIT (OUTPATIENT)
Dept: NEUROLOGY | Facility: CLINIC | Age: 51
End: 2021-07-23

## 2021-07-23 VITALS
SYSTOLIC BLOOD PRESSURE: 108 MMHG | HEART RATE: 95 BPM | DIASTOLIC BLOOD PRESSURE: 68 MMHG | WEIGHT: 210 LBS | HEIGHT: 64 IN | BODY MASS INDEX: 35.85 KG/M2 | OXYGEN SATURATION: 96 %

## 2021-07-23 DIAGNOSIS — E11.42 DIABETIC PERIPHERAL NEUROPATHY (HCC): Chronic | ICD-10-CM

## 2021-07-23 PROCEDURE — 99214 OFFICE O/P EST MOD 30 MIN: CPT | Performed by: NURSE PRACTITIONER

## 2021-07-23 RX ORDER — DULOXETIN HYDROCHLORIDE 60 MG/1
60 CAPSULE, DELAYED RELEASE ORAL DAILY
Qty: 30 CAPSULE | Refills: 5 | Status: SHIPPED | OUTPATIENT
Start: 2021-07-23 | End: 2022-05-25

## 2021-07-23 NOTE — ASSESSMENT & PLAN NOTE
Diabetes is unchanged.   increased medication   Diabetes will be reassessed at next F/U.    Increased Cymbalta to 60 mg PO daily     Follow up with Rheumatology for abnormal labs     Patient will check with insurance and notify me who is covered for PM and then will send new referral.     F/U in 3 months or sooner if needed

## 2021-07-23 NOTE — PROGRESS NOTES
Subjective:     Patient ID: Karli Ricardo is a 51 y.o. female.    CC:   Chief Complaint   Patient presents with   • Peripheral Neuropathy       HPI:   History of Present Illness   50 y.o. female returns in follow up for Neuropathy. At last appointment on 6/7/21 continued GBP, stopped Elavil and started Cymbalta. Ordered labs.      MRI Brain 6/2/21, my review of images, mild small vessel disease, otherwise unremarkable     Labs 6/7/21: RF, ACE, Sjogren's - [facility]  GIOVANI +, lupus panel +, Sed rate 79 - placed Rheumatology referral     Pain is an 8/10. Does not feel like going out and doing anything due to the discomfort. Feels worsened fatigue for several days after doing anything. Continues on  mg PO QID.     Started Cymbalta 30 mg PO daily, does not notice side effect, no improvement in pain as of yet.      Trials: Lyrica, Trileptal, Elavil, GBP, Cymbalta     Has an appointment with Rheumatology in September.       Patient did not feel PM would be covered by insurance and declined appointment.      Problem History:     Pain steadily worsening. L>R     Tingling and pain in bilateral lower extremities, feels like they are on fire and having cramping. Does not feel like her arms are as bad as her legs, but they also have the sensation. Sensation has extetnded into her lower stomach.       Denies saddle anesthesia or bowel/bladder incontinence, headaches, fatigue, or difficulty ambulating.     Is having difficulty sleeping at night. Feeling more clumsy. Having muscle spasms in her calves and thighs.     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  Wearing the wrist splints at night and an ace bandage during the day intermittently with some improvement in her wrist and hand pain.      Last HA1C was 10.1 on 3/19/21.      Labs 12/3/20: CBC, CMP, Mag - [facility]   11/24/20: B12/Folate - [facility]      Medical Records Reviewed:   Dr. Chand OV 3/3/21: 51 y/o female with DM c/o increasing numbness and pains,  electrical jolts in her hand and legs for about one year, painful numbness in her left lateral thigh, low back pain. She reports restless sleep, daytime drowsiness. She has tried gabapentin 300 mg tid, now on Lyrica 150 mg tid, feels that it is not helping. Recent b12 was 497, folate, 5.97, A1C 14. She is now on insulin awaiting an appointment with endocrinology. Denies use of alcohol.     MRI Lumbar Spine 3/17/21: multilevel degenerative changes     EMG/N[facility] 3/31/21: + for diabetic neuropathy, mild to moderate CTS in bilateral wrists.       The following portions of the patient's history were reviewed and updated as appropriate: allergies, current medications, past family history, past medical history, past social history, past surgical history and problem list.    Past Medical History:   Diagnosis Date   • Acid reflux    • Arthritis    • Asthma    • Chronic bronchitis (CMS/[facility])    • Colon polyp 2015   • Diabetes (CMS/[facility])    • Diverticulitis    • Diverticulosis    • Fracture     lateral left ankle   • Gallstones    • Gout    • Headache    • Hypertension    • Ileitis 2019   • Low back pain    • Neuromuscular disorder (CMS/HCC)    • Thyroid disease    • Type 2 diabetes mellitus (CMS/[facility])    • Visual impairment        Past Surgical History:   Procedure Laterality Date   •  SECTION     • CHOLECYSTECTOMY     • COLONOSCOPY  ,    DR LYLES,DR MARISCAL   • DILATATION AND CURETTAGE     • ENDOSCOPY      DR LOIDA ROBERTS   • FOOT SURGERY     • HYSTERECTOMY     • UPPER GASTROINTESTINAL ENDOSCOPY  2015       Social History     Socioeconomic History   • Marital status:      Spouse name: Not on file   • Number of children: Not on file   • Years of education: Not on file   • Highest education level: Not on file   Tobacco Use   • Smoking status: Current Every Day Smoker     Packs/day: 1.00     Years: 20.00     Pack years: 20.00     Types: Cigarettes   • Smokeless tobacco:  "Never Used   Vaping Use   • Vaping Use: Never used   Substance and Sexual Activity   • Alcohol use: No   • Drug use: No   • Sexual activity: Yes     Partners: Male     Birth control/protection: Surgical       Family History   Problem Relation Age of Onset   • Arthritis Mother    • Mental illness Mother    • Migraines Mother    • Osteoporosis Mother    • Thyroid disease Mother    • Depression Mother    • Heart disease Mother    • Colon cancer Father         passed away at age 53   • Cancer Father    • Thyroid disease Sister         Objective:  /68   Pulse 95   Ht 162.6 cm (64.02\")   Wt 95.3 kg (210 lb)   SpO2 96%   BMI 36.03 kg/m²     Neurologic Exam     Mental Status   Oriented to person, place, and time.   Follows 3 step commands.   Attention: normal. Concentration: normal.   Speech: speech is normal   Level of consciousness: alert  Knowledge: good and consistent with education.   Able to name object. Able to read. Able to repeat. Able to write. Normal comprehension.     Cranial Nerves     CN II   Visual fields full to confrontation.   Visual acuity: normal  Right visual field deficit: none  Left visual field deficit: none     CN III, IV, VI   Pupils are equal, round, and reactive to light.  Extraocular motions are normal.   Right pupil: Shape: regular. Reactivity: brisk. Consensual response: intact.   Left pupil: Shape: regular. Reactivity: brisk. Consensual response: intact.   Nystagmus: none   Diplopia: none  Ophthalmoparesis: none  Upgaze: normal  Downgaze: normal  Conjugate gaze: present  Vestibulo-ocular reflex: present    CN V   Facial sensation intact.   Right corneal reflex: normal  Left corneal reflex: normal    CN VII   Right facial weakness: none  Left facial weakness: none    CN VIII   Hearing: intact    CN IX, X   Palate: symmetric  Right gag reflex: normal  Left gag reflex: normal    CN XI   Right sternocleidomastoid strength: normal  Left sternocleidomastoid strength: normal    CN XII "   Tongue: not atrophic  Fasciculations: absent  Tongue deviation: none    Motor Exam   Muscle bulk: normal  Overall muscle tone: normal  Right arm tone: normal  Left arm tone: normal  Right leg tone: normal  Left leg tone: normal    Strength   Strength 5/5 throughout.     Sensory Exam   Right arm light touch: decreased from elbow  Left arm light touch: decreased from elbow  Right leg light touch: decreased from knee  Left leg light touch: decreased from knee    Gait, Coordination, and Reflexes     Gait  Gait: normal    Coordination   Finger to nose coordination: normal    Tremor   Resting tremor: absent  Intention tremor: absent  Action tremor: absent    Reflexes   Reflexes 2+ except as noted.       Physical Exam  Vitals and nursing note reviewed.   Constitutional:       Appearance: Normal appearance.   HENT:      Head: Normocephalic and atraumatic.   Eyes:      Extraocular Movements: EOM normal.      Pupils: Pupils are equal, round, and reactive to light.   Skin:     General: Skin is warm and dry.   Neurological:      Mental Status: She is alert and oriented to person, place, and time.      Coordination: Finger-Nose-Finger Test normal.      Gait: Gait is intact.      Deep Tendon Reflexes: Strength normal.   Psychiatric:         Mood and Affect: Mood normal.         Speech: Speech normal.         Assessment/Plan:       Diagnoses and all orders for this visit:    1. Diabetic peripheral neuropathy (CMS/HCC)  Assessment & Plan:  Diabetes is unchanged.   increased medication   Diabetes will be reassessed at next F/U.    Increased Cymbalta to 60 mg PO daily     Follow up with Rheumatology for abnormal labs     Patient will check with insurance and notify me who is covered for PM and then will send new referral.     F/U in 3 months or sooner if needed    Orders:  -     DULoxetine (Cymbalta) 60 MG capsule; Take 1 capsule by mouth Daily.  Dispense: 30 capsule; Refill: 5           Reviewed medications, potential side effects  and signs and symptoms to report. Discussed risk versus benefits of treatment plan with patient and/or family-including medications, labs and radiology that may be ordered. Addressed questions and concerns during visit. Patient and/or family verbalized understanding and agree with plan. Patient instructed to call the office with questions or concerns and report to ED with life-threatening symptoms.     AS THE PROVIDER, I PERSONALLY WORE PPE DURING ENTIRE FACE TO FACE ENCOUNTER IN CLINIC WITH THE PATIENT. PATIENT ALSO WORE PPE DURING ENTIRE FACE TO FACE ENCOUNTER EXCEPT FOR A MAX OF 30 SECONDS DURING NEUROLOGICAL EVALUATION OF CRANIAL NERVES AND THEN MASK WAS PLACED BACK OVER PATIENT FACE FOR REMAINDER OF VISIT. I WASHED MY HANDS BEFORE AND AFTER VISIT.    During this visit the following were done:  Labs Reviewed [x]    Labs Ordered []    Radiology Reports Reviewed []    Radiology Ordered []    PCP Records Reviewed []    Referring Provider Records Reviewed []    ER Records Reviewed []    Hospital Records Reviewed []    History Obtained From Family []    Radiology Images Reviewed []    Other Reviewed []    Records Requested []      Return in about 3 months (around 10/23/2021).      Ashia Rhodes, DAFNE  7/23/2021

## 2021-09-10 ENCOUNTER — LAB (OUTPATIENT)
Dept: LAB | Facility: HOSPITAL | Age: 51
End: 2021-09-10

## 2021-09-10 ENCOUNTER — TRANSCRIBE ORDERS (OUTPATIENT)
Dept: LAB | Facility: HOSPITAL | Age: 51
End: 2021-09-10

## 2021-09-10 DIAGNOSIS — M13.0 POLYARTHRITIS: Primary | ICD-10-CM

## 2021-09-10 DIAGNOSIS — M13.0 POLYARTHRITIS: ICD-10-CM

## 2021-09-10 LAB
CK SERPL-CCNC: 49 U/L (ref 20–180)
CRP SERPL-MCNC: 3.46 MG/DL (ref 0–0.5)
ERYTHROCYTE [SEDIMENTATION RATE] IN BLOOD: 57 MM/HR (ref 0–30)
URATE SERPL-MCNC: 4.9 MG/DL (ref 2.4–5.7)

## 2021-09-10 PROCEDURE — 85652 RBC SED RATE AUTOMATED: CPT

## 2021-09-10 PROCEDURE — 82550 ASSAY OF CK (CPK): CPT

## 2021-09-10 PROCEDURE — 84550 ASSAY OF BLOOD/URIC ACID: CPT

## 2021-09-10 PROCEDURE — 86140 C-REACTIVE PROTEIN: CPT

## 2021-09-10 PROCEDURE — 36415 COLL VENOUS BLD VENIPUNCTURE: CPT

## 2021-10-25 DIAGNOSIS — E11.42 DIABETIC PERIPHERAL NEUROPATHY (HCC): ICD-10-CM

## 2021-10-25 RX ORDER — TRAMADOL HYDROCHLORIDE 50 MG/1
50 TABLET ORAL EVERY 8 HOURS PRN
Qty: 90 TABLET | Refills: 1 | Status: SHIPPED | OUTPATIENT
Start: 2021-10-25 | End: 2022-01-05 | Stop reason: SDUPTHER

## 2021-10-25 NOTE — TELEPHONE ENCOUNTER
Rx Refill Note  Requested Prescriptions     Pending Prescriptions Disp Refills   • traMADol (ULTRAM) 50 MG tablet 90 tablet 1     Sig: Take 1 tablet by mouth Every 8 (Eight) Hours As Needed for Severe Pain .      Last office visit with prescribing clinician: 6/18/2021      Next office visit with prescribing clinician: 11/22/2021        none  {TIP  Is Refill Pharmacy correct?:23}  Josselin Govea MA  10/25/21, 17:38 EDT

## 2021-10-25 NOTE — TELEPHONE ENCOUNTER
Caller: Karli Ricardo    Relationship: Self      Medication requested (name and dosage): traMADol (ULTRAM) 50 MG tablet    Requested Prescriptions:   Requested Prescriptions     Pending Prescriptions Disp Refills   • traMADol (ULTRAM) 50 MG tablet 90 tablet 1     Sig: Take 1 tablet by mouth Every 8 (Eight) Hours As Needed for Severe Pain .        Pharmacy where request should be sent: [id number] - [place], [place] - [address] - [phone]  - [phone]   [phone]    Additional details provided by patient: 2-4 DAYS LEFT. PATIENT IS REQUESTING A REFILL TO LAST UNTIL HER APPOINTMENT ON 11/22/21    Best call back number: [phone]     Does the patient have less than a 3 day supply:  [x] Yes  [] No    Dasha Sanders Rep   10/25/21 12:30 EDT

## 2021-11-23 ENCOUNTER — TELEPHONE (OUTPATIENT)
Dept: INTERNAL MEDICINE | Facility: CLINIC | Age: 51
End: 2021-11-23

## 2021-11-23 DIAGNOSIS — E11.42 DIABETIC PERIPHERAL NEUROPATHY (HCC): ICD-10-CM

## 2021-11-23 RX ORDER — GABAPENTIN 600 MG/1
600 TABLET ORAL 4 TIMES DAILY PRN
Qty: 120 TABLET | Refills: 1 | OUTPATIENT
Start: 2021-11-23

## 2021-11-23 NOTE — TELEPHONE ENCOUNTER
It looks like PCP was giving her the Gabapentin but patient is saying you refilled it last. Please advise

## 2021-11-23 NOTE — TELEPHONE ENCOUNTER
Caller: Karli Ricardo    Relationship: Self    Best call back number: [phone]  Requested Prescriptions:   Requested Prescriptions     Pending Prescriptions Disp Refills   • gabapentin (NEURONTIN) 600 MG tablet 120 tablet 1     Sig: Take 1 tablet by mouth 4 (Four) Times a Day As Needed (neuropathic pain).        Pharmacy where request should be sent:  [facility]    Additional details provided by patient: N/A    Does the patient have less than a 3 day supply:  [] Yes  [x] No    Dasha Mcdonnell Rep   11/23/21 12:22 EST

## 2021-11-30 ENCOUNTER — LAB (OUTPATIENT)
Dept: LAB | Facility: HOSPITAL | Age: 51
End: 2021-11-30

## 2021-11-30 ENCOUNTER — TRANSCRIBE ORDERS (OUTPATIENT)
Dept: LAB | Facility: HOSPITAL | Age: 51
End: 2021-11-30

## 2021-11-30 DIAGNOSIS — M05.79 SEROPOSITIVE RHEUMATOID ARTHRITIS OF MULTIPLE SITES (HCC): ICD-10-CM

## 2021-11-30 DIAGNOSIS — Z72.0 TOBACCO ABUSE: ICD-10-CM

## 2021-11-30 LAB
BASOPHILS # BLD AUTO: 0.08 10*3/MM3 (ref 0–0.2)
BASOPHILS NFR BLD AUTO: 0.7 % (ref 0–1.5)
DEPRECATED RDW RBC AUTO: 42.2 FL (ref 37–54)
EOSINOPHIL # BLD AUTO: 0.12 10*3/MM3 (ref 0–0.4)
EOSINOPHIL NFR BLD AUTO: 1.1 % (ref 0.3–6.2)
ERYTHROCYTE [DISTWIDTH] IN BLOOD BY AUTOMATED COUNT: 13.7 % (ref 12.3–15.4)
ERYTHROCYTE [SEDIMENTATION RATE] IN BLOOD: 41 MM/HR (ref 0–30)
HCT VFR BLD AUTO: 51.4 % (ref 34–46.6)
HGB BLD-MCNC: 17.4 G/DL (ref 12–15.9)
IMM GRANULOCYTES # BLD AUTO: 0.06 10*3/MM3 (ref 0–0.05)
IMM GRANULOCYTES NFR BLD AUTO: 0.5 % (ref 0–0.5)
LYMPHOCYTES # BLD AUTO: 2.93 10*3/MM3 (ref 0.7–3.1)
LYMPHOCYTES NFR BLD AUTO: 25.8 % (ref 19.6–45.3)
MCH RBC QN AUTO: 28.9 PG (ref 26.6–33)
MCHC RBC AUTO-ENTMCNC: 33.9 G/DL (ref 31.5–35.7)
MCV RBC AUTO: 85.2 FL (ref 79–97)
MONOCYTES # BLD AUTO: 0.62 10*3/MM3 (ref 0.1–0.9)
MONOCYTES NFR BLD AUTO: 5.5 % (ref 5–12)
NEUTROPHILS NFR BLD AUTO: 66.4 % (ref 42.7–76)
NEUTROPHILS NFR BLD AUTO: 7.56 10*3/MM3 (ref 1.7–7)
NRBC BLD AUTO-RTO: 0 /100 WBC (ref 0–0.2)
PLATELET # BLD AUTO: 261 10*3/MM3 (ref 140–450)
PMV BLD AUTO: 11 FL (ref 6–12)
RBC # BLD AUTO: 6.03 10*6/MM3 (ref 3.77–5.28)
WBC NRBC COR # BLD: 11.37 10*3/MM3 (ref 3.4–10.8)

## 2021-11-30 PROCEDURE — 85025 COMPLETE CBC W/AUTO DIFF WBC: CPT

## 2021-11-30 PROCEDURE — 86140 C-REACTIVE PROTEIN: CPT

## 2021-11-30 PROCEDURE — 85652 RBC SED RATE AUTOMATED: CPT

## 2021-11-30 PROCEDURE — 36415 COLL VENOUS BLD VENIPUNCTURE: CPT

## 2021-11-30 PROCEDURE — 80053 COMPREHEN METABOLIC PANEL: CPT

## 2021-12-01 LAB
ALBUMIN SERPL-MCNC: 3.8 G/DL (ref 3.5–5.2)
ALBUMIN/GLOB SERPL: 1.2 G/DL
ALP SERPL-CCNC: 104 U/L (ref 39–117)
ALT SERPL W P-5'-P-CCNC: 28 U/L (ref 1–33)
ANION GAP SERPL CALCULATED.3IONS-SCNC: 7.1 MMOL/L (ref 5–15)
AST SERPL-CCNC: 31 U/L (ref 1–32)
BILIRUB SERPL-MCNC: 0.5 MG/DL (ref 0–1.2)
BUN SERPL-MCNC: 9 MG/DL (ref 6–20)
BUN/CREAT SERPL: 12.5 (ref 7–25)
CALCIUM SPEC-SCNC: 9.3 MG/DL (ref 8.6–10.5)
CHLORIDE SERPL-SCNC: 101 MMOL/L (ref 98–107)
CO2 SERPL-SCNC: 28.9 MMOL/L (ref 22–29)
CREAT SERPL-MCNC: 0.72 MG/DL (ref 0.57–1)
CRP SERPL-MCNC: 3.36 MG/DL (ref 0–0.5)
GFR SERPL CREATININE-BSD FRML MDRD: 85 ML/MIN/1.73
GLOBULIN UR ELPH-MCNC: 3.1 GM/DL
GLUCOSE SERPL-MCNC: 367 MG/DL (ref 65–99)
POTASSIUM SERPL-SCNC: 5 MMOL/L (ref 3.5–5.2)
PROT SERPL-MCNC: 6.9 G/DL (ref 6–8.5)
SODIUM SERPL-SCNC: 137 MMOL/L (ref 136–145)

## 2021-12-06 ENCOUNTER — TELEPHONE (OUTPATIENT)
Dept: INTERNAL MEDICINE | Facility: CLINIC | Age: 51
End: 2021-12-06

## 2021-12-06 NOTE — TELEPHONE ENCOUNTER
Caller: Karli Ricardo    Relationship to patient: Self    Best call back number:     [phone]     Patient is needing:     PATIENT RETURNED A CALL FROM THE OFFICE    DR KAPADIA

## 2021-12-07 NOTE — TELEPHONE ENCOUNTER
Called pt, left vm explaining that someone had been trying to get ahold of her about a colonoscopy and her next appointment in January, said if she needs to discuss or has any questions to call us back.

## 2021-12-15 ENCOUNTER — OFFICE VISIT (OUTPATIENT)
Dept: NEUROLOGY | Facility: CLINIC | Age: 51
End: 2021-12-15

## 2021-12-15 VITALS
WEIGHT: 203 LBS | OXYGEN SATURATION: 98 % | TEMPERATURE: 97.3 F | BODY MASS INDEX: 34.66 KG/M2 | DIASTOLIC BLOOD PRESSURE: 78 MMHG | HEART RATE: 96 BPM | HEIGHT: 64 IN | SYSTOLIC BLOOD PRESSURE: 121 MMHG

## 2021-12-15 DIAGNOSIS — E11.59 HYPERTENSION ASSOCIATED WITH DIABETES (HCC): ICD-10-CM

## 2021-12-15 DIAGNOSIS — E11.65 TYPE 2 DIABETES MELLITUS WITH HYPERGLYCEMIA, WITH LONG-TERM CURRENT USE OF INSULIN (HCC): ICD-10-CM

## 2021-12-15 DIAGNOSIS — I15.2 HYPERTENSION ASSOCIATED WITH DIABETES (HCC): ICD-10-CM

## 2021-12-15 DIAGNOSIS — Z79.4 TYPE 2 DIABETES MELLITUS WITH HYPERGLYCEMIA, WITH LONG-TERM CURRENT USE OF INSULIN (HCC): ICD-10-CM

## 2021-12-15 DIAGNOSIS — E11.42 DIABETIC PERIPHERAL NEUROPATHY (HCC): Primary | Chronic | ICD-10-CM

## 2021-12-15 DIAGNOSIS — M05.79 RHEUMATOID ARTHRITIS INVOLVING MULTIPLE SITES WITH POSITIVE RHEUMATOID FACTOR (HCC): ICD-10-CM

## 2021-12-15 PROCEDURE — 99213 OFFICE O/P EST LOW 20 MIN: CPT | Performed by: NURSE PRACTITIONER

## 2021-12-15 RX ORDER — LISINOPRIL 5 MG/1
5 TABLET ORAL DAILY
Qty: 90 TABLET | Refills: 0 | Status: SHIPPED | OUTPATIENT
Start: 2021-12-15

## 2021-12-15 RX ORDER — PREDNISONE 10 MG/1
10 TABLET ORAL
COMMUNITY
Start: 2021-10-22 | End: 2022-12-26

## 2021-12-15 NOTE — PROGRESS NOTES
Subjective:     Patient ID: Karli Ricardo is a 51 y.o. female.    CC:   Chief Complaint   Patient presents with   • Diabetic Peripheral Neuropathy     Follow up        HPI:   History of Present Illness   50 y.o. female returns in follow up for Neuropathy. At last appointment on 7/23/21 increased Cymbalta to 60 mg PO daily.      MRI Brain 6/2/21, my review of images, mild small vessel disease, otherwise unremarkable      Labs 6/7/21: RF, ACE, Sjogren's - N[facility]  GIOVANI +, lupus panel +, Sed rate 79 - placed Rheumatology referral    Following with Rheumatology and diagnosed with Rheumatoid arthritis, started on Methotrexate, prednisone, and folic acid.     Pain is an 8/10. Does not feel like going out and doing anything due to the discomfort. Feels worsened fatigue for several days after doing anything. Continues on  mg PO QID.      Increased Cymbalta 60 mg PO daily, does not notice side effect, no improvement in pain      Trials: Lyrica, Trileptal, Elavil, GBP, Cymbalta      Problem History:     Pain steadily worsening. L>R     Tingling and pain in bilateral lower extremities, feels like they are on fire and having cramping. Does not feel like her arms are as bad as her legs, but they also have the sensation. Sensation has extetnded into her lower stomach.       Denies saddle anesthesia or bowel/bladder incontinence, headaches, fatigue, or difficulty ambulating.     Is having difficulty sleeping at night. Feeling more clumsy. Having muscle spasms in her calves and thighs.     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  Wearing the wrist splints at night and an ace bandage during the day intermittently with some improvement in her wrist and hand pain.      Last HA1C was 10.1 on 3/19/21.      Labs 12/3/20: CBC, CMP, Mag - [facility]   11/24/20: B12/Folate - [facility]      Medical Records Reviewed:   Dr. Chand OV 3/3/21: 51 y/o female with DM c/o increasing numbness and pains, electrical jolts in her hand and  legs for about one year, painful numbness in her left lateral thigh, low back pain. She reports restless sleep, daytime drowsiness. She has tried gabapentin 300 mg tid, now on Lyrica 150 mg tid, feels that it is not helping. Recent b12 was 497, folate, 5.97, A1C 14. She is now on insulin awaiting an appointment with endocrinology. Denies use of alcohol.     MRI Lumbar Spine 3/17/21: multilevel degenerative changes     EMG/NCS 3/31/21: + for diabetic neuropathy, mild to moderate CTS in bilateral wrists.     The following portions of the patient's history were reviewed and updated as appropriate: allergies, current medications, past family history, past medical history, past social history, past surgical history and problem list.    Past Medical History:   Diagnosis Date   • Acid reflux    • Arthritis    • Asthma    • Chronic bronchitis (HCC)    • Colon polyp 2015   • Diabetes (HCC)    • Diverticulitis    • Diverticulosis    • Fracture     lateral left ankle   • Gallstones    • Gout    • Headache    • Hypertension    • Ileitis 2019   • Low back pain    • Neuromuscular disorder (HCC)    • Thyroid disease    • Type 2 diabetes mellitus (HCC)    • Visual impairment        Past Surgical History:   Procedure Laterality Date   •  SECTION     • CHOLECYSTECTOMY     • COLONOSCOPY  ,    DR LYLES,DR MARISCAL   • DILATATION AND CURETTAGE     • ENDOSCOPY      DR LOIDA ROBERTS   • FOOT SURGERY     • HYSTERECTOMY     • UPPER GASTROINTESTINAL ENDOSCOPY  2015       Social History     Socioeconomic History   • Marital status:    Tobacco Use   • Smoking status: Current Every Day Smoker     Packs/day: 1.00     Years: 20.00     Pack years: 20.00     Types: Cigarettes   • Smokeless tobacco: Never Used   Vaping Use   • Vaping Use: Never used   Substance and Sexual Activity   • Alcohol use: No   • Drug use: No   • Sexual activity: Yes     Partners: Male     Birth control/protection:  "Surgical       Family History   Problem Relation Age of Onset   • Arthritis Mother    • Mental illness Mother    • Migraines Mother    • Osteoporosis Mother    • Thyroid disease Mother    • Depression Mother    • Heart disease Mother    • Colon cancer Father         passed away at age 53   • Cancer Father    • Thyroid disease Sister         Objective:  /78   Pulse 96   Temp 97.3 °F (36.3 °C)   Ht 162.6 cm (64\")   Wt 92.1 kg (203 lb)   SpO2 98%   Breastfeeding No   BMI 34.84 kg/m²     Neurologic Exam     Mental Status   Oriented to person, place, and time.   Follows 3 step commands.   Attention: normal. Concentration: normal.   Speech: speech is normal   Level of consciousness: alert  Knowledge: good and consistent with education.   Able to name object. Able to read. Able to repeat. Able to write. Normal comprehension.     Cranial Nerves     CN II   Visual fields full to confrontation.   Visual acuity: normal  Right visual field deficit: none  Left visual field deficit: none     CN III, IV, VI   Pupils are equal, round, and reactive to light.  Extraocular motions are normal.   Right pupil: Shape: regular. Reactivity: brisk. Consensual response: intact.   Left pupil: Shape: regular. Reactivity: brisk. Consensual response: intact.   Nystagmus: none   Diplopia: none  Ophthalmoparesis: none  Upgaze: normal  Downgaze: normal  Conjugate gaze: present  Vestibulo-ocular reflex: present    CN V   Facial sensation intact.   Right corneal reflex: normal  Left corneal reflex: normal    CN VII   Right facial weakness: none  Left facial weakness: none    CN VIII   Hearing: intact    CN IX, X   Palate: symmetric  Right gag reflex: normal  Left gag reflex: normal    CN XI   Right sternocleidomastoid strength: normal  Left sternocleidomastoid strength: normal    CN XII   Tongue: not atrophic  Fasciculations: absent  Tongue deviation: none    Motor Exam   Muscle bulk: normal  Overall muscle tone: normal    Strength "   Strength 5/5 throughout.     Sensory Exam   Right leg light touch: decreased from knee  Left leg light touch: decreased from knee    Gait, Coordination, and Reflexes     Gait  Gait: normal    Tremor   Resting tremor: absent  Intention tremor: absent  Action tremor: absent    Reflexes   Reflexes 2+ except as noted.       Physical Exam  Vitals and nursing note reviewed.   Constitutional:       Appearance: Normal appearance.   HENT:      Head: Normocephalic and atraumatic.   Eyes:      Extraocular Movements: Extraocular movements intact and EOM normal.      Pupils: Pupils are equal, round, and reactive to light.   Skin:     General: Skin is warm and dry.   Neurological:      Mental Status: She is alert and oriented to person, place, and time.      Gait: Gait is intact.      Deep Tendon Reflexes: Strength normal.   Psychiatric:         Mood and Affect: Mood normal.         Speech: Speech normal.         Assessment/Plan:       Diagnoses and all orders for this visit:    1. Diabetic peripheral neuropathy (HCC) (Primary)  Assessment & Plan:  Diabetes is unchanged.   Continue current treatment regimen.  Diabetes will be reassessed as needed .    Patient diagnosed with RA and Rheumatology recommended patient establish with Pain management.     Placed Pain management referral.     I recommend she continue to follow up with Rheumatology and PCP for diabetes, neuropathy, and RA. Patient agrees with plan of care and will follow up with me on an as needed basis.     Orders:  -     Ambulatory Referral to Pain Management    2. Rheumatoid arthritis involving multiple sites with positive rheumatoid factor (HCC)  -     Ambulatory Referral to Pain Management           Reviewed medications, potential side effects and signs and symptoms to report. Discussed risk versus benefits of treatment plan with patient and/or family-including medications, labs and radiology that may be ordered. Addressed questions and concerns during visit.  Patient and/or family verbalized understanding and agree with plan. Patient instructed to call the office with questions or concerns and report to ED with life-threatening symptoms.     AS THE PROVIDER, I PERSONALLY WORE PPE DURING ENTIRE FACE TO FACE ENCOUNTER IN CLINIC WITH THE PATIENT. PATIENT ALSO WORE PPE DURING ENTIRE FACE TO FACE ENCOUNTER EXCEPT FOR A MAX OF 30 SECONDS DURING NEUROLOGICAL EVALUATION OF CRANIAL NERVES AND THEN MASK WAS PLACED BACK OVER PATIENT FACE FOR REMAINDER OF VISIT. I WASHED MY HANDS BEFORE AND AFTER VISIT.    During this visit the following were done:  Labs Reviewed []    Labs Ordered []    Radiology Reports Reviewed []    Radiology Ordered []    PCP Records Reviewed []    Referring Provider Records Reviewed []    ER Records Reviewed []    Hospital Records Reviewed []    History Obtained From Family []    Radiology Images Reviewed []    Other Reviewed [x]    Records Requested []      Return if symptoms worsen or fail to improve.      Ashia Rhodes, APRN  12/15/2021

## 2021-12-15 NOTE — ASSESSMENT & PLAN NOTE
Diabetes is unchanged.   Continue current treatment regimen.  Diabetes will be reassessed as needed .    Patient diagnosed with RA and Rheumatology recommended patient establish with Pain management.     Placed Pain management referral.     I recommend she continue to follow up with Rheumatology and PCP for diabetes, neuropathy, and RA. Patient agrees with plan of care and will follow up with me on an as needed basis.

## 2021-12-15 NOTE — TELEPHONE ENCOUNTER
Rx Refill Note  Requested Prescriptions     Pending Prescriptions Disp Refills   • lisinopril (PRINIVIL,ZESTRIL) 5 MG tablet 90 tablet 3     Sig: Take 1 tablet by mouth Daily. Indications: diabetes      Last office visit with prescribing clinician: 6/18/2021      Next office visit with prescribing clinician: 1/5/2022            Veronica Chamberlain LPN  12/15/21, 17:49 EST

## 2021-12-22 ENCOUNTER — TELEPHONE (OUTPATIENT)
Dept: INTERNAL MEDICINE | Facility: CLINIC | Age: 51
End: 2021-12-22

## 2021-12-22 NOTE — TELEPHONE ENCOUNTER
Patient did not complete US VASCULAR EXT ordered on 11/19/2020. This order is too old to be used. May I cancel the order?

## 2022-01-05 ENCOUNTER — OFFICE VISIT (OUTPATIENT)
Dept: INTERNAL MEDICINE | Facility: CLINIC | Age: 52
End: 2022-01-05

## 2022-01-05 VITALS
OXYGEN SATURATION: 96 % | RESPIRATION RATE: 16 BRPM | DIASTOLIC BLOOD PRESSURE: 70 MMHG | HEART RATE: 73 BPM | HEIGHT: 64 IN | TEMPERATURE: 97.3 F | SYSTOLIC BLOOD PRESSURE: 116 MMHG | BODY MASS INDEX: 34.83 KG/M2 | WEIGHT: 204 LBS

## 2022-01-05 DIAGNOSIS — E11.65 TYPE 2 DIABETES MELLITUS WITH HYPERGLYCEMIA, WITH LONG-TERM CURRENT USE OF INSULIN: Primary | ICD-10-CM

## 2022-01-05 DIAGNOSIS — G89.29 CHRONIC MUSCULOSKELETAL PAIN: ICD-10-CM

## 2022-01-05 DIAGNOSIS — M06.9 RHEUMATOID ARTHRITIS INVOLVING MULTIPLE SITES, UNSPECIFIED WHETHER RHEUMATOID FACTOR PRESENT: ICD-10-CM

## 2022-01-05 DIAGNOSIS — E03.9 HYPOTHYROIDISM, UNSPECIFIED TYPE: ICD-10-CM

## 2022-01-05 DIAGNOSIS — Z79.4 TYPE 2 DIABETES MELLITUS WITH HYPERGLYCEMIA, WITH LONG-TERM CURRENT USE OF INSULIN: Primary | ICD-10-CM

## 2022-01-05 DIAGNOSIS — E11.42 DIABETIC PERIPHERAL NEUROPATHY: ICD-10-CM

## 2022-01-05 DIAGNOSIS — M79.18 CHRONIC MUSCULOSKELETAL PAIN: ICD-10-CM

## 2022-01-05 DIAGNOSIS — Z91.199 NONCOMPLIANCE: ICD-10-CM

## 2022-01-05 DIAGNOSIS — B37.31 YEAST INFECTION INVOLVING THE VAGINA AND SURROUNDING AREA: ICD-10-CM

## 2022-01-05 PROCEDURE — 99214 OFFICE O/P EST MOD 30 MIN: CPT | Performed by: FAMILY MEDICINE

## 2022-01-05 PROCEDURE — 83036 HEMOGLOBIN GLYCOSYLATED A1C: CPT | Performed by: FAMILY MEDICINE

## 2022-01-05 RX ORDER — TIZANIDINE 4 MG/1
4 TABLET ORAL DAILY
COMMUNITY
Start: 2021-12-23 | End: 2022-01-05 | Stop reason: SDUPTHER

## 2022-01-05 RX ORDER — TIZANIDINE 4 MG/1
4 TABLET ORAL EVERY 8 HOURS PRN
Qty: 90 TABLET | Refills: 5 | Status: SHIPPED | OUTPATIENT
Start: 2022-01-05

## 2022-01-05 RX ORDER — AMITRIPTYLINE HYDROCHLORIDE 25 MG/1
TABLET, FILM COATED ORAL
Qty: 180 TABLET | Refills: 3 | Status: SHIPPED | OUTPATIENT
Start: 2022-01-05 | End: 2022-05-25

## 2022-01-05 RX ORDER — PROCHLORPERAZINE 25 MG/1
SUPPOSITORY RECTAL
Qty: 3 EACH | Refills: 11 | Status: SHIPPED | OUTPATIENT
Start: 2022-01-05 | End: 2022-01-19 | Stop reason: CLARIF

## 2022-01-05 RX ORDER — FOLIC ACID 1 MG/1
1000 TABLET ORAL DAILY
COMMUNITY
Start: 2021-12-23

## 2022-01-05 RX ORDER — AMITRIPTYLINE HYDROCHLORIDE 25 MG/1
25 TABLET, FILM COATED ORAL NIGHTLY
COMMUNITY
End: 2022-01-05 | Stop reason: SDUPTHER

## 2022-01-05 RX ORDER — INSULIN GLARGINE 300 U/ML
75 INJECTION, SOLUTION SUBCUTANEOUS NIGHTLY
Qty: 7.5 ML | Refills: 5 | Status: SHIPPED | OUTPATIENT
Start: 2022-01-05 | End: 2022-05-25 | Stop reason: SDUPTHER

## 2022-01-05 RX ORDER — INSULIN LISPRO 200 [IU]/ML
25-35 INJECTION, SOLUTION SUBCUTANEOUS
Qty: 48 ML | Refills: 3 | Status: SHIPPED | OUTPATIENT
Start: 2022-01-05 | End: 2022-05-25 | Stop reason: SDUPTHER

## 2022-01-05 RX ORDER — METFORMIN HYDROCHLORIDE 500 MG/1
1000 TABLET, EXTENDED RELEASE ORAL 2 TIMES DAILY
Qty: 360 TABLET | Refills: 3 | Status: SHIPPED | OUTPATIENT
Start: 2022-01-05

## 2022-01-05 RX ORDER — TRAMADOL HYDROCHLORIDE 50 MG/1
50 TABLET ORAL EVERY 8 HOURS PRN
Qty: 90 TABLET | Refills: 1 | Status: SHIPPED | OUTPATIENT
Start: 2022-01-05 | End: 2022-05-25 | Stop reason: SDUPTHER

## 2022-01-05 RX ORDER — FLUCONAZOLE 150 MG/1
150 TABLET ORAL
Qty: 3 TABLET | Refills: 5 | Status: SHIPPED | OUTPATIENT
Start: 2022-01-05 | End: 2022-01-12

## 2022-01-05 RX ORDER — PROCHLORPERAZINE 25 MG/1
1 SUPPOSITORY RECTAL ONCE
Qty: 1 EACH | Refills: 0 | Status: SHIPPED | OUTPATIENT
Start: 2022-01-05 | End: 2022-01-05

## 2022-01-05 RX ORDER — LEVOTHYROXINE SODIUM 0.05 MG/1
50 TABLET ORAL DAILY
Qty: 90 TABLET | Refills: 3 | Status: SHIPPED | OUTPATIENT
Start: 2022-01-05

## 2022-01-05 RX ORDER — PROCHLORPERAZINE 25 MG/1
1 SUPPOSITORY RECTAL CONTINUOUS
Qty: 1 EACH | Refills: 0 | Status: SHIPPED | OUTPATIENT
Start: 2022-01-05 | End: 2022-01-19 | Stop reason: CLARIF

## 2022-01-05 NOTE — PROGRESS NOTES
"Subjective    Georgia Shameka Ricardo is a 51 y.o. female here for:  Chief Complaint   Patient presents with   • Diabetes     3 month follow up, A1c, pt has not followed up with Endo       History per MA reviewed.     Quit going to endo as she doesn't feel like going anywhere  Says she needs to take med as prescribed and better with diet.   Has missed days she's not taken meds, not taken insulin  Rheumatology said she has \"rheumatoid arthritis bad\"   Had to quit working due to how she feels  She was on thyroid medicine prior to starting care here, notes she felt better then. Would like to go back on         The following portions of the patient's history were reviewed and updated as appropriate: allergies, current medications, past family history, past medical history, past social history, past surgical history and problem list.    Review of Systems   Constitutional: Positive for activity change and fatigue.   Musculoskeletal: Positive for arthralgias.   Neurological: Positive for numbness.       Objective   Visit Vitals  /70 (BP Location: Left arm, Patient Position: Sitting, Cuff Size: Adult)   Pulse 73   Temp 97.3 °F (36.3 °C) (Temporal)   Resp 16   Ht 162.6 cm (64\")   Wt 92.5 kg (204 lb)   SpO2 96%   BMI 35.02 kg/m²       Physical Exam  Vitals and nursing note reviewed.   Constitutional:       General: She is not in acute distress.     Appearance: Normal appearance. She is well-developed and well-groomed. She is obese. She is not ill-appearing, toxic-appearing or diaphoretic.      Interventions: Face mask in place.   HENT:      Head: Normocephalic and atraumatic.      Right Ear: Hearing normal.      Left Ear: Hearing normal.   Eyes:      General: Lids are normal. No scleral icterus.     Extraocular Movements: Extraocular movements intact.   Neck:      Trachea: Phonation normal.   Pulmonary:      Effort: Pulmonary effort is normal.   Musculoskeletal:      Cervical back: Neck supple.   Skin:     Coloration: Skin " is not jaundiced or pale.   Neurological:      General: No focal deficit present.      Mental Status: She is alert and oriented to person, place, and time.      Motor: Motor function is intact.   Psychiatric:         Attention and Perception: Attention and perception normal.         Mood and Affect: Mood and affect normal.         Speech: Speech normal.         Behavior: Behavior normal. Behavior is cooperative.         Thought Content: Thought content normal.         Cognition and Memory: Cognition and memory normal.         Judgment: Judgment normal.         For medical decision making review of the following was required:  Lab Results   Component Value Date    HGBA1C 13.1 01/06/2022    HGBA1C 10.1 03/19/2021    HGBA1C 14.60 (H) 11/24/2020     Lab Results   Component Value Date    TSH 3.350 04/15/2021     Lab Results   Component Value Date    FREET4 0.92 (L) 04/15/2021     Reviewed last ESR and CRP ordered by rheumatology. Both elevated.    Assessment/Plan     Problem List Items Addressed This Visit        Endocrine and Metabolic    Type 2 diabetes mellitus with hyperglycemia, with long-term current use of insulin (HCC) - Primary    Relevant Medications    metFORMIN ER (Glucophage XR) 500 MG 24 hr tablet    Insulin Glargine, 1 Unit Dial, (Toujeo SoloStar) 300 UNIT/ML solution pen-injector injection    Insulin Lispro (HumaLOG KwikPen) 200 UNIT/ML solution pen-injector    Continuous Blood Gluc Sensor (Dexcom G6 Sensor)    Continuous Blood Gluc  (Dexcom G6 ) device    Other Relevant Orders    POC Glycosylated Hemoglobin (Hb A1C) (Completed)       Neuro    Diabetic peripheral neuropathy (HCC)    Relevant Medications    metFORMIN ER (Glucophage XR) 500 MG 24 hr tablet    Insulin Glargine, 1 Unit Dial, (Toujeo SoloStar) 300 UNIT/ML solution pen-injector injection    Insulin Lispro (HumaLOG KwikPen) 200 UNIT/ML solution pen-injector    traMADol (ULTRAM) 50 MG tablet    amitriptyline (ELAVIL) 25 MG tablet       Other Visit Diagnoses     Rheumatoid arthritis involving multiple sites, unspecified whether rheumatoid factor present (HCC)        Chronic musculoskeletal pain        Relevant Medications    traMADol (ULTRAM) 50 MG tablet    amitriptyline (ELAVIL) 25 MG tablet    tiZANidine (ZANAFLEX) 4 MG tablet    Yeast infection involving the vagina and surrounding area        Relevant Medications    fluconazole (Diflucan) 150 MG tablet    Hypothyroidism, unspecified type        Relevant Medications    levothyroxine (Synthroid) 50 MCG tablet    Noncompliance              · Stressed compliance with medicines. Part of why she may feel so poorly could be the uncontrolled diabetes mellitus. Willing to try levothyroxine but we'll have to check labs in 6-8 weeks. Watch for symptoms of overactive thyroid. Follow up with rheumatology, discussed methotrexate and folic acid, likely will need a therapy change. Discussed follow up in a month to help with accountability.    Return in about 3 weeks (around 1/26/2022) for 3-4 weeks diabetes.   Lovely Loyd MD

## 2022-01-06 LAB
EXPIRATION DATE: NORMAL
HBA1C MFR BLD: 13.1 %
Lab: NORMAL

## 2022-01-13 ENCOUNTER — PRIOR AUTHORIZATION (OUTPATIENT)
Dept: INTERNAL MEDICINE | Facility: CLINIC | Age: 52
End: 2022-01-13

## 2022-01-13 DIAGNOSIS — E11.65 TYPE 2 DIABETES MELLITUS WITH HYPERGLYCEMIA, WITH LONG-TERM CURRENT USE OF INSULIN: Primary | ICD-10-CM

## 2022-01-13 DIAGNOSIS — Z79.4 TYPE 2 DIABETES MELLITUS WITH HYPERGLYCEMIA, WITH LONG-TERM CURRENT USE OF INSULIN: Primary | ICD-10-CM

## 2022-01-13 DIAGNOSIS — E11.42 DIABETIC PERIPHERAL NEUROPATHY: ICD-10-CM

## 2022-01-19 ENCOUNTER — TELEPHONE (OUTPATIENT)
Dept: INTERNAL MEDICINE | Facility: CLINIC | Age: 52
End: 2022-01-19

## 2022-01-19 RX ORDER — FLASH GLUCOSE SENSOR
1 KIT MISCELLANEOUS ONCE
Qty: 1 EACH | Refills: 0 | Status: SHIPPED | OUTPATIENT
Start: 2022-01-19 | End: 2022-01-19

## 2022-01-19 RX ORDER — FLASH GLUCOSE SENSOR
1 KIT MISCELLANEOUS
Qty: 2 EACH | Refills: 6 | Status: SHIPPED | OUTPATIENT
Start: 2022-01-19 | End: 2022-05-25

## 2022-01-19 NOTE — TELEPHONE ENCOUNTER
Patient called back, hadn't listened to voicemail. I told patient about her Dexcom not being approved and switching to the Freestyle.

## 2022-01-27 ENCOUNTER — LAB (OUTPATIENT)
Dept: LAB | Facility: HOSPITAL | Age: 52
End: 2022-01-27

## 2022-01-27 ENCOUNTER — TELEPHONE (OUTPATIENT)
Dept: INTERNAL MEDICINE | Facility: CLINIC | Age: 52
End: 2022-01-27

## 2022-01-27 DIAGNOSIS — Z03.818 ENCOUNTER FOR PATIENT CONCERN ABOUT EXPOSURE TO INFECTIOUS ORGANISM: Primary | ICD-10-CM

## 2022-01-27 LAB — SARS-COV-2 RNA NOSE QL NAA+PROBE: DETECTED

## 2022-01-27 PROCEDURE — U0004 COV-19 TEST NON-CDC HGH THRU: HCPCS

## 2022-01-27 NOTE — TELEPHONE ENCOUNTER
SPOUSE IS POSITIVE FOR COVID.  PATIENT HAVING HEADACHE, SINUS PRESSURE, FEELING WEAK, FEVER.  HAS NOT BEEN TESTED.  WHAT TO DO?    PLEASE CALL [phone]

## 2022-01-28 NOTE — TELEPHONE ENCOUNTER
PATIENT CALLING BACK IN TO REPORT SHE IS COVID POSITIVE.    HER SYMPTOMS ARE BAD HEADACHE, FEVER, FATIGUE AND CONGESTION    PLEASE CALL AND ADVISE

## 2022-04-29 ENCOUNTER — TELEPHONE (OUTPATIENT)
Dept: SURGERY | Facility: CLINIC | Age: 52
End: 2022-04-29

## 2022-04-29 NOTE — TELEPHONE ENCOUNTER
PT IS READY TO S/C 5 COLONOSCOPY RECALL. PT LAST COLON 08/25/2015  I HAVE VERIFY PT DEMO & INSURANCE.

## 2022-05-11 ENCOUNTER — PREP FOR SURGERY (OUTPATIENT)
Dept: OTHER | Facility: HOSPITAL | Age: 52
End: 2022-05-11

## 2022-05-11 DIAGNOSIS — Z80.0 FAMILY HISTORY OF COLON CANCER: Primary | ICD-10-CM

## 2022-05-11 NOTE — TELEPHONE ENCOUNTER
Pt scheduled at [facility] on 6/27/22 for colon, instructions mailed, mag citrate prep.

## 2022-05-11 NOTE — TELEPHONE ENCOUNTER
PRESCREENING FOR OPEN ACCESS SCHEDULING    Karli Ricardo, 1970  [id number]    05/11/22    If, the patient answers yes to any of the following questions the provider will be informed prior to scheduling open access for approval and documented in the chart.    [x]  Yes  [] No    1. Have you ever had a colonoscopy in the past?      When:  5 yrs      Where:       Polyps or other:     [x]  Yes  [] No    2. Family history of colon cancer?      Relation: father      Age of onset:       Do you currently have any of the following?    []  Yes  [x] No  Rectal bleeding, if so, how long?     []  Yes  [x] No  Abdominal pain, if so, how long?    []  Yes  [x] No  Constipation, if so, how long?    []  Yes  [x] No  Diarrhea, if so, how long?    []  Yes  [x] No  Weight loss, is so, how much?    [] Yes  [x] No  Small caliber stool, if so, how long?      Have you ever had any of the following conditions?    [] Yes  [x] No  Heart attack?      When?       Last cardiac workup?     Blood thinners?    [] Yes  [x] No   Lung problems, asthma or COPD?  [] Yes  [x] No  Oxygen required?       [] Yes  [x] No  Stroke?     [] Yes  [x] No  Have you ever had a reaction to anesthesia?

## 2022-05-15 DIAGNOSIS — E11.42 DIABETIC PERIPHERAL NEUROPATHY: ICD-10-CM

## 2022-05-15 DIAGNOSIS — G89.29 CHRONIC MUSCULOSKELETAL PAIN: ICD-10-CM

## 2022-05-15 DIAGNOSIS — M79.18 CHRONIC MUSCULOSKELETAL PAIN: ICD-10-CM

## 2022-05-16 RX ORDER — TRAMADOL HYDROCHLORIDE 50 MG/1
TABLET ORAL
Qty: 90 TABLET | Refills: 0 | OUTPATIENT
Start: 2022-05-16

## 2022-05-16 NOTE — TELEPHONE ENCOUNTER
Rx Refill Note  Requested Prescriptions     Pending Prescriptions Disp Refills   • traMADol (ULTRAM) 50 MG tablet [Pharmacy Med Name: traMADol HCl Oral Tablet 50 MG] 90 tablet 0     Sig: TAKE 1 TABLET BY MOUTH EVERY EIGHT HOURS AS NEEDED FOR SEVERE PAIN      Last office visit with prescribing clinician: 1/5/2022      Next office visit with prescribing clinician: Visit date not found            Josselin Govea MA  05/16/22, 11:11 EDT

## 2022-05-16 NOTE — TELEPHONE ENCOUNTER
Due for appointment. Looks like she goes to pain management per LAURA. They should be prescribing this med.

## 2022-05-17 NOTE — TELEPHONE ENCOUNTER
Called pt, scheduled appointment for 05/25/2022. Pt also wanted to state that pain management was suppose to give her a prescription for tramadol but never did.

## 2022-05-25 ENCOUNTER — OFFICE VISIT (OUTPATIENT)
Dept: INTERNAL MEDICINE | Facility: CLINIC | Age: 52
End: 2022-05-25

## 2022-05-25 VITALS
HEART RATE: 90 BPM | WEIGHT: 198.6 LBS | RESPIRATION RATE: 16 BRPM | TEMPERATURE: 97.8 F | HEIGHT: 64 IN | DIASTOLIC BLOOD PRESSURE: 80 MMHG | OXYGEN SATURATION: 95 % | BODY MASS INDEX: 33.9 KG/M2 | SYSTOLIC BLOOD PRESSURE: 120 MMHG

## 2022-05-25 DIAGNOSIS — G89.29 CHRONIC MUSCULOSKELETAL PAIN: ICD-10-CM

## 2022-05-25 DIAGNOSIS — E03.9 ACQUIRED HYPOTHYROIDISM: ICD-10-CM

## 2022-05-25 DIAGNOSIS — E66.09 CLASS 1 OBESITY DUE TO EXCESS CALORIES WITH SERIOUS COMORBIDITY AND BODY MASS INDEX (BMI) OF 34.0 TO 34.9 IN ADULT: ICD-10-CM

## 2022-05-25 DIAGNOSIS — E11.59 HYPERTENSION ASSOCIATED WITH DIABETES: ICD-10-CM

## 2022-05-25 DIAGNOSIS — E11.42 DIABETIC PERIPHERAL NEUROPATHY: ICD-10-CM

## 2022-05-25 DIAGNOSIS — Z91.199 NONCOMPLIANCE: ICD-10-CM

## 2022-05-25 DIAGNOSIS — R79.9 ABNORMAL BLOOD CHEMISTRY: ICD-10-CM

## 2022-05-25 DIAGNOSIS — R11.0 NAUSEA: ICD-10-CM

## 2022-05-25 DIAGNOSIS — M79.18 CHRONIC MUSCULOSKELETAL PAIN: ICD-10-CM

## 2022-05-25 DIAGNOSIS — E53.8 LOW FOLATE: ICD-10-CM

## 2022-05-25 DIAGNOSIS — I15.2 HYPERTENSION ASSOCIATED WITH DIABETES: ICD-10-CM

## 2022-05-25 DIAGNOSIS — M06.9 RHEUMATOID ARTHRITIS INVOLVING MULTIPLE SITES, UNSPECIFIED WHETHER RHEUMATOID FACTOR PRESENT: ICD-10-CM

## 2022-05-25 DIAGNOSIS — E11.65 TYPE 2 DIABETES MELLITUS WITH HYPERGLYCEMIA, WITH LONG-TERM CURRENT USE OF INSULIN: Primary | ICD-10-CM

## 2022-05-25 DIAGNOSIS — Z79.4 TYPE 2 DIABETES MELLITUS WITH HYPERGLYCEMIA, WITH LONG-TERM CURRENT USE OF INSULIN: Primary | ICD-10-CM

## 2022-05-25 LAB
EXPIRATION DATE: NORMAL
HBA1C MFR BLD: 13 %
Lab: NORMAL

## 2022-05-25 PROCEDURE — 99214 OFFICE O/P EST MOD 30 MIN: CPT | Performed by: FAMILY MEDICINE

## 2022-05-25 PROCEDURE — 83036 HEMOGLOBIN GLYCOSYLATED A1C: CPT | Performed by: FAMILY MEDICINE

## 2022-05-25 RX ORDER — NORTRIPTYLINE HYDROCHLORIDE 25 MG/1
25 CAPSULE ORAL
COMMUNITY
Start: 2022-05-22

## 2022-05-25 RX ORDER — INSULIN LISPRO 200 [IU]/ML
25-35 INJECTION, SOLUTION SUBCUTANEOUS
Qty: 48 ML | Refills: 3 | Status: SHIPPED | OUTPATIENT
Start: 2022-05-25

## 2022-05-25 RX ORDER — TRAMADOL HYDROCHLORIDE 50 MG/1
50 TABLET ORAL EVERY 8 HOURS PRN
Qty: 90 TABLET | Refills: 3 | Status: SHIPPED | OUTPATIENT
Start: 2022-05-25

## 2022-05-25 RX ORDER — GABAPENTIN 800 MG/1
800 TABLET ORAL 3 TIMES DAILY
COMMUNITY
Start: 2022-05-22

## 2022-05-25 RX ORDER — FLUCONAZOLE 150 MG/1
150 TABLET ORAL AS NEEDED
COMMUNITY
End: 2022-08-18

## 2022-05-25 RX ORDER — ONDANSETRON 8 MG/1
8 TABLET, ORALLY DISINTEGRATING ORAL EVERY 8 HOURS PRN
Qty: 30 TABLET | Refills: 1 | Status: SHIPPED | OUTPATIENT
Start: 2022-05-25

## 2022-05-25 RX ORDER — INSULIN GLARGINE 300 U/ML
75 INJECTION, SOLUTION SUBCUTANEOUS NIGHTLY
Qty: 7.5 ML | Refills: 5 | Status: SHIPPED | OUTPATIENT
Start: 2022-05-25

## 2022-05-25 NOTE — PROGRESS NOTES
05/25/2022      Assessment & Plan    Problem List Items Addressed This Visit        Cardiac and Vasculature    Hypertension associated with diabetes (HCC)    Relevant Medications    Insulin Lispro (HumaLOG KwikPen) 200 UNIT/ML solution pen-injector    Insulin Glargine, 1 Unit Dial, (Toujeo SoloStar) 300 UNIT/ML solution pen-injector injection    Other Relevant Orders    CBC & Differential    Comprehensive Metabolic Panel    TSH+Free T4       Endocrine and Metabolic    Type 2 diabetes mellitus with hyperglycemia, with long-term current use of insulin ([facility]) - Primary    Relevant Medications    Insulin Lispro (HumaLOG KwikPen) 200 UNIT/ML solution pen-injector    Insulin Glargine, 1 Unit Dial, (Toujeo SoloStar) 300 UNIT/ML solution pen-injector injection    Other Relevant Orders    POC Glycosylated Hemoglobin (Hb A1C) (Completed)    Ambulatory Referral to Pain Management (Completed)    Lipid Panel    CBC & Differential    Comprehensive Metabolic Panel    TSH+Free T4    Ambulatory Referral to Endocrinology    Ambulatory Referral to Nutrition Services       Neuro    Diabetic peripheral neuropathy (HCC)    Relevant Medications    traMADol (ULTRAM) 50 MG tablet    Insulin Lispro (HumaLOG KwikPen) 200 UNIT/ML solution pen-injector    Insulin Glargine, 1 Unit Dial, (Toujeo SoloStar) 300 UNIT/ML solution pen-injector injection    Other Relevant Orders    Ambulatory Referral to Pain Management (Completed)      Other Visit Diagnoses     Chronic musculoskeletal pain        Relevant Medications    traMADol (ULTRAM) 50 MG tablet    Other Relevant Orders    Ambulatory Referral to Pain Management (Completed)    Vitamin B12    Folate    CBC & Differential    Comprehensive Metabolic Panel    TSH+Free T4    Uric Acid    Vitamin D 25 Hydroxy    PTH, Intact    Rheumatoid arthritis involving multiple sites, unspecified whether rheumatoid factor present (HCC)        Relevant Orders    Ambulatory Referral to Pain Management (Completed)     Nausea        Relevant Medications    ondansetron ODT (Zofran ODT) 8 MG disintegrating tablet    Low folate        Relevant Orders    Vitamin B12    Folate    CBC & Differential    Abnormal blood chemistry        Relevant Orders    Lipid Panel    Vitamin B12    Folate    CBC & Differential    Comprehensive Metabolic Panel    TSH+Free T4    Uric Acid    Vitamin D 25 Hydroxy    PTH, Intact    Acquired hypothyroidism        Relevant Orders    CBC & Differential    TSH+Free T4    Noncompliance        Class 1 obesity due to excess calories with serious comorbidity and body mass index (BMI) of 34.0 to 34.9 in adult        Relevant Orders    Ambulatory Referral to Nutrition Services        Pt wants to go to hospital lab as she's not sure lab downstairs covered by her insurance. Orders in.    Stressed need to be complaint with treatments. A1C not changing, eAG >320s. Neuropathy likely worsening due to uncontrolled diabetes mellitus. Patient needs help with diet, knowing what to eat. Referral in for dietitian. Need to get diabetes mellitus under control. Discussed discharge from clinic as a possibility given noncompliance. She voiced understanding.     Will refill tramadol while she waits to get in with another pain clinic. When in with them they need to  both controlled substances (gabapentin and tramadol).    LAURA reviewed and appropriate.    BMI is >= 30 and <= 34.9 (Class 1 obesity). The following options were offered after discussion: nutrition counseling/recommendations and referral to a nutritionist     Return in about 3 months (around 8/28/2022) for Annual physical.      Subjective      Karli Ricardo is a 51 y.o. female here for:  Chief Complaint   Patient presents with   • Diabetes     Follow up, A1c            History per MA reviewed.    Diabetes mellitus: not wearing cgm as it caused pain/discomfort. Admits she's not taking care of herself, reports chronic pain and fatigue.     Chronic pain:  "followed by rheumatology for rheumatoid arthritis. Taking folic acid with methotrexate. Feels current pain clinic not helping, would like second opinion. They're not prescribing tramadol, she's not sure why, had discussed with them. Neuropathy worsening over time.          The following portions of the patient's history were reviewed and updated as appropriate: allergies, current medications, past family history, past medical history, past social history, past surgical history and problem list.    Review of Systems   Constitutional: Positive for fatigue.   Gastrointestinal: Positive for nausea.   Musculoskeletal: Positive for arthralgias.   Psychiatric/Behavioral: Positive for stress.         Objective   Visit Vitals  /80 (BP Location: Left arm, Patient Position: Sitting, Cuff Size: Adult)   Pulse 90   Temp 97.8 °F (36.6 °C) (Temporal)   Resp 16   Ht 162.6 cm (64\")   Wt 90.1 kg (198 lb 9.6 oz)   SpO2 95%   BMI 34.09 kg/m²       Physical Exam  Vitals and nursing note reviewed.   Constitutional:       General: She is not in acute distress.     Appearance: Normal appearance. She is well-developed and well-groomed. She is obese. She is not ill-appearing, toxic-appearing or diaphoretic.      Interventions: Face mask in place.   HENT:      Head: Normocephalic and atraumatic.      Right Ear: Hearing normal.      Left Ear: Hearing normal.   Eyes:      General: Lids are normal. No scleral icterus.     Extraocular Movements: Extraocular movements intact.   Neck:      Trachea: Phonation normal.   Cardiovascular:      Comments: Varicose veins bilateral lower extremities  Pulmonary:      Effort: Pulmonary effort is normal.   Musculoskeletal:      Cervical back: Neck supple.      Right lower leg: No edema.      Left lower leg: No edema.   Skin:     Coloration: Skin is not jaundiced or pale.   Neurological:      General: No focal deficit present.      Mental Status: She is alert and oriented to person, place, and time.      " Motor: Motor function is intact.   Psychiatric:         Attention and Perception: Attention and perception normal.         Mood and Affect: Mood is depressed. Affect is flat.         Speech: Speech normal.         Behavior: Behavior normal. Behavior is cooperative.         Thought Content: Thought content normal.         Cognition and Memory: Cognition and memory normal.         Judgment: Judgment normal.           For medical decision making review of the following was required:    Lab Results   Component Value Date    HGBA1C 13 05/25/2022    HGBA1C 13.1 01/06/2022    HGBA1C 10.1 03/19/2021     Lab Results   Component Value Date    TSH 3.350 04/15/2021     Lab Results   Component Value Date    FREET4 0.92 (L) 04/15/2021          Lovely Loyd MD

## 2022-05-27 ENCOUNTER — PRIOR AUTHORIZATION (OUTPATIENT)
Dept: INTERNAL MEDICINE | Facility: CLINIC | Age: 52
End: 2022-05-27

## 2022-06-22 ENCOUNTER — TELEPHONE (OUTPATIENT)
Dept: SURGERY | Facility: CLINIC | Age: 52
End: 2022-06-22

## 2022-06-27 ENCOUNTER — HOSPITAL ENCOUNTER (OUTPATIENT)
Facility: HOSPITAL | Age: 52
Setting detail: HOSPITAL OUTPATIENT SURGERY
Discharge: HOME | End: 2022-06-27
Attending: SURGERY | Admitting: SURGERY

## 2022-06-27 ENCOUNTER — ANESTHESIA (OUTPATIENT)
Dept: GASTROENTEROLOGY | Facility: HOSPITAL | Age: 52
End: 2022-06-27

## 2022-06-27 ENCOUNTER — ANESTHESIA EVENT (OUTPATIENT)
Dept: GASTROENTEROLOGY | Facility: HOSPITAL | Age: 52
End: 2022-06-27

## 2022-06-27 VITALS
BODY MASS INDEX: 33.46 KG/M2 | TEMPERATURE: 97.2 F | HEART RATE: 94 BPM | WEIGHT: 196 LBS | SYSTOLIC BLOOD PRESSURE: 126 MMHG | HEIGHT: 64 IN | OXYGEN SATURATION: 94 % | RESPIRATION RATE: 16 BRPM | DIASTOLIC BLOOD PRESSURE: 83 MMHG

## 2022-06-27 DIAGNOSIS — Z80.0 FAMILY HISTORY OF COLON CANCER: ICD-10-CM

## 2022-06-27 LAB — GLUCOSE BLDC GLUCOMTR-MCNC: 260 MG/DL (ref 70–130)

## 2022-06-27 PROCEDURE — 45384 COLONOSCOPY W/LESION REMOVAL: CPT | Performed by: SURGERY

## 2022-06-27 PROCEDURE — 25010000002 PROPOFOL 1000 MG/100ML EMULSION: Performed by: NURSE ANESTHETIST, CERTIFIED REGISTERED

## 2022-06-27 PROCEDURE — 45385 COLONOSCOPY W/LESION REMOVAL: CPT | Performed by: SURGERY

## 2022-06-27 PROCEDURE — 82962 GLUCOSE BLOOD TEST: CPT

## 2022-06-27 PROCEDURE — S0260 H&P FOR SURGERY: HCPCS | Performed by: SURGERY

## 2022-06-27 RX ORDER — SODIUM CHLORIDE, SODIUM LACTATE, POTASSIUM CHLORIDE, CALCIUM CHLORIDE 600; 310; 30; 20 MG/100ML; MG/100ML; MG/100ML; MG/100ML
1000 INJECTION, SOLUTION INTRAVENOUS CONTINUOUS
Status: DISCONTINUED | OUTPATIENT
Start: 2022-06-27 | End: 2022-06-27 | Stop reason: HOSPADM

## 2022-06-27 RX ORDER — LIDOCAINE HYDROCHLORIDE 20 MG/ML
INJECTION, SOLUTION INTRAVENOUS AS NEEDED
Status: DISCONTINUED | OUTPATIENT
Start: 2022-06-27 | End: 2022-06-27 | Stop reason: SURG

## 2022-06-27 RX ORDER — PROPOFOL 10 MG/ML
INJECTION, EMULSION INTRAVENOUS AS NEEDED
Status: DISCONTINUED | OUTPATIENT
Start: 2022-06-27 | End: 2022-06-27 | Stop reason: SURG

## 2022-06-27 RX ORDER — SODIUM CHLORIDE 0.9 % (FLUSH) 0.9 %
10 SYRINGE (ML) INJECTION AS NEEDED
Status: DISCONTINUED | OUTPATIENT
Start: 2022-06-27 | End: 2022-06-27 | Stop reason: HOSPADM

## 2022-06-27 RX ORDER — ONDANSETRON 2 MG/ML
4 INJECTION INTRAMUSCULAR; INTRAVENOUS ONCE AS NEEDED
Status: DISCONTINUED | OUTPATIENT
Start: 2022-06-27 | End: 2022-06-27 | Stop reason: HOSPADM

## 2022-06-27 RX ADMIN — SODIUM CHLORIDE, POTASSIUM CHLORIDE, SODIUM LACTATE AND CALCIUM CHLORIDE 1000 ML: 600; 310; 30; 20 INJECTION, SOLUTION INTRAVENOUS at 10:45

## 2022-06-27 RX ADMIN — PROPOFOL 100 MG: 10 INJECTION, EMULSION INTRAVENOUS at 11:18

## 2022-06-27 RX ADMIN — PROPOFOL 100 MG: 10 INJECTION, EMULSION INTRAVENOUS at 11:14

## 2022-06-27 RX ADMIN — PROPOFOL 100 MG: 10 INJECTION, EMULSION INTRAVENOUS at 11:26

## 2022-06-27 RX ADMIN — PROPOFOL 200 MG: 10 INJECTION, EMULSION INTRAVENOUS at 11:06

## 2022-06-27 RX ADMIN — LIDOCAINE HYDROCHLORIDE 60 MG: 20 INJECTION, SOLUTION INTRAVENOUS at 11:06

## 2022-06-27 NOTE — ANESTHESIA POSTPROCEDURE EVALUATION
Patient: Karli Ricarod    Procedure Summary     Date: 06/27/22 Room / Location: [facility] ENDOSCOPY 3 / [facility] ENDOSCOPY    Anesthesia Start: 1103 Anesthesia Stop: 1128    Procedure: COLONOSCOPY (N/A ) Diagnosis:       Family history of colon cancer      (Family history of colon cancer [Z80.0])    Surgeons: Jesus Melendez MD Provider: Sanju Arredondo CRNA    Anesthesia Type: MAC ASA Status: 3          Anesthesia Type: MAC    Vitals  No vitals data found for the desired time range.          Post Anesthesia Care and Evaluation    Patient location during evaluation: bedside  Patient participation: complete - patient participated  Level of consciousness: awake  Pain score: 0  Pain management: adequate    Airway patency: patent  Anesthetic complications: No anesthetic complications  PONV Status: controlled  Cardiovascular status: acceptable and stable  Respiratory status: acceptable and room air  Hydration status: acceptable    Comments: See nursing documentation for post op vital signs

## 2022-06-27 NOTE — ANESTHESIA PREPROCEDURE EVALUATION
Anesthesia Evaluation     Patient summary reviewed and Nursing notes reviewed   no history of anesthetic complications:  NPO Solid Status: > 8 hours  NPO Liquid Status: > 8 hours           Airway   Mallampati: II  TM distance: >3 FB  Neck ROM: full  Possible difficult intubation, Difficult intubation highly probable and Large neck circumference  Dental      Pulmonary    (+) a smoker Current, COPD, asthma,shortness of breath, sleep apnea, decreased breath sounds,   Cardiovascular     (+) hypertension, VILLANUEVA, PVD,   CAD:  inc risk obese, jolie, ? dm.      Neuro/Psych  (+) headaches, numbness, psychiatric history Anxiety and Depression,    GI/Hepatic/Renal/Endo    (+) obesity,  GERD,  renal disease stones, diabetes mellitus type 2 poorly controlled, thyroid problem hypothyroidism    Musculoskeletal     (+) arthralgias, back pain, chronic pain, myalgias,   Abdominal   (+) obese,    Substance History      OB/GYN          Other   arthritis,                      Anesthesia Plan    ASA 3     MAC     (Risks and benefits discussed including risk of aspiration, recall and dental damage. All patient questions answered.    Will continue with plan of care.)  intravenous induction     Anesthetic plan, risks, benefits, and alternatives have been provided, discussed and informed consent has been obtained with: patient.        CODE STATUS:

## 2022-06-28 LAB — REF LAB TEST METHOD: NORMAL

## 2022-07-11 ENCOUNTER — TELEPHONE (OUTPATIENT)
Dept: INTERNAL MEDICINE | Facility: CLINIC | Age: 52
End: 2022-07-11

## 2022-07-11 DIAGNOSIS — E11.65 TYPE 2 DIABETES MELLITUS WITH HYPERGLYCEMIA, WITH LONG-TERM CURRENT USE OF INSULIN: ICD-10-CM

## 2022-07-11 DIAGNOSIS — Z79.4 TYPE 2 DIABETES MELLITUS WITH HYPERGLYCEMIA, WITH LONG-TERM CURRENT USE OF INSULIN: ICD-10-CM

## 2022-07-11 RX ORDER — PEN NEEDLE, DIABETIC 32GX 5/32"
1 NEEDLE, DISPOSABLE MISCELLANEOUS 4 TIMES DAILY
Qty: 100 EACH | Refills: 12 | Status: SHIPPED | OUTPATIENT
Start: 2022-07-11

## 2022-07-11 NOTE — TELEPHONE ENCOUNTER
Caller: Karli Ricardo    Relationship: Self    Best call back number: [phone]    Requested Prescriptions: NEEDLES  Requested Prescriptions      No prescriptions requested or ordered in this encounter        Pharmacy where request should be sent: [id number] [place], [place] - [address]  - [phone]  - [phone] FX     Additional details provided by patient: [place] SAYS SHE HAS 1 NEEDLE LEFT.    Does the patient have less than a 3 day supply:  [x] Yes  [] No    Dasha Bourgeois Rep   07/11/22 11:26 EDT      PLEASE HAVE A NURSE CALL [place] BACK.  SHE HAS A PERSONAL QUESTION.

## 2022-07-15 ENCOUNTER — TELEPHONE (OUTPATIENT)
Dept: INTERNAL MEDICINE | Facility: CLINIC | Age: 52
End: 2022-07-15

## 2022-07-15 NOTE — TELEPHONE ENCOUNTER
Caller: Karli Ricardo    Relationship: Self    Best call back number: [phone]    What is the medical concern/diagnosis: FEELING DEPRESSED    What specialty or service is being requested: PSYCHIATRIST   Any additional details: PT REQUEST TO SEE SOMEONE WITHIN [facility]

## 2022-07-27 ENCOUNTER — OFFICE VISIT (OUTPATIENT)
Dept: PSYCHIATRY | Facility: CLINIC | Age: 52
End: 2022-07-27

## 2022-07-27 DIAGNOSIS — F33.2 SEVERE EPISODE OF RECURRENT MAJOR DEPRESSIVE DISORDER, WITHOUT PSYCHOTIC FEATURES: Primary | ICD-10-CM

## 2022-07-27 DIAGNOSIS — F41.1 GENERALIZED ANXIETY DISORDER: ICD-10-CM

## 2022-07-27 PROCEDURE — 90791 PSYCH DIAGNOSTIC EVALUATION: CPT | Performed by: SOCIAL WORKER
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
## 2022-08-10 ENCOUNTER — APPOINTMENT (OUTPATIENT)
Dept: NUTRITION | Facility: HOSPITAL | Age: 52
End: 2022-08-10

## 2022-08-16 ENCOUNTER — TELEPHONE (OUTPATIENT)
Dept: INTERNAL MEDICINE | Facility: CLINIC | Age: 52
End: 2022-08-16

## 2022-08-16 NOTE — TELEPHONE ENCOUNTER
Patient stated that she is having left abdominal pain. I advised pt a appt is needed. We are full as a practice advised pt to go to [facility]

## 2022-08-19 ENCOUNTER — TELEPHONE (OUTPATIENT)
Dept: URGENT CARE | Facility: CLINIC | Age: 52
End: 2022-08-19

## 2022-08-19 DIAGNOSIS — J18.9 COMMUNITY ACQUIRED PNEUMONIA OF LEFT LOWER LOBE OF LUNG: Primary | ICD-10-CM

## 2022-08-19 RX ORDER — DOXYCYCLINE HYCLATE 100 MG/1
100 CAPSULE ORAL 2 TIMES DAILY
Qty: 14 CAPSULE | Refills: 0 | OUTPATIENT
Start: 2022-08-19 | End: 2022-12-26

## 2022-11-07 ENCOUNTER — OFFICE VISIT (OUTPATIENT)
Dept: PSYCHIATRY | Facility: CLINIC | Age: 52
End: 2022-11-07

## 2022-11-07 DIAGNOSIS — F33.2 SEVERE EPISODE OF RECURRENT MAJOR DEPRESSIVE DISORDER, WITHOUT PSYCHOTIC FEATURES: Primary | ICD-10-CM

## 2022-11-07 DIAGNOSIS — F41.1 GENERALIZED ANXIETY DISORDER: ICD-10-CM

## 2022-11-07 PROCEDURE — 90832 PSYTX W PT 30 MINUTES: CPT | Performed by: SOCIAL WORKER

## 2022-11-07 NOTE — PROGRESS NOTES
"Date: 11/07/2022   Time In: 1000  Time Out: 1036    PROGRESS NOTE  Data:  Karli Ricardo is a 52 y.o. female who presents today for individual therapy session at [facility].     ICD-10-CM ICD-9-CM   1. Severe episode of recurrent major depressive disorder, without psychotic features (HCC)  F33.2 296.33   2. Generalized anxiety disorder  F41.1 300.02     Patient reports feeling \"emotional\" since her evaluation. She reports sleeping 12-14 hours per day, anhedonia, depressed mood, low energy, poor appetite, feeling bad about herself and difficulty concentrating. She reports continuing to have pain associated with her physical health problems and this also contributes to her isolating. She reports continued anxiety about driving. She reports wanting to be able to spend time with family and enjoy things again. She says her  has been supportive of her. She discussed missing being able to work but says not working has been move helpful for her health. Patient denies SI. Medication referral made.    Clinical Maneuvering/Intervention:    (Scales based on 0 - 10 with 10 being the worst)  Depression:   na Anxiety:  na       Assisted patient in processing above session content; acknowledged and normalized patient’s thoughts, feelings, and concerns.  Assisted patient in processing her thoughts and feelings regarding her ongoing health concerns. Discussed coping skills that are helpful for depression such as spending time with family and friends and participating in activities.     Allowed patient to freely discuss issues without interruption or judgment. Provided safe, confidential environment to facilitate the development of positive therapeutic relationship and encourage open, honest communication. Assisted patient in identifying risk factors which would indicate the need for higher level of care including thoughts to harm self or others and/or self-harming behavior and encouraged patient to " contact this office, call 911, or present to the nearest emergency room should any of these events occur. Discussed crisis intervention services and means to access. Patient adamantly and convincingly denies current suicidal or homicidal ideation or perceptual disturbance.    Assessment   Patient appears to maintain relative stability as compared to their baseline.  However, patient continues to struggle with depression and anxiety which continues to cause impairment in important areas of functioning.  As a result, they can be reasonably expected to continue to benefit from treatment and would likely be at increased risk for decompensation otherwise.    Mental Status Exam:   Hygiene:   good  Cooperation:  Cooperative  Eye Contact:  Fair  Psychomotor Behavior:  Slow  Affect:  Blunted  Mood: depressed  Speech:  Normal  Thought Process:  Goal directed  Thought Content:  Mood congruent  Suicidal:  None  Homicidal:  None  Hallucinations:  None  Delusion:  None  Memory:  Intact  Orientation:  Person, Place, Time and Situation  Reliability:  good  Insight:  Fair  Judgement:  Fair  Impulse Control:  Good  Physical/Medical Issues:  Yes patient reports RA, thyroid and diabetic neuropathy     Patient's Support Network Includes:   and children    Functional Status: Severe impairment    Progress toward goal: Not at goal    Prognosis: Good with Ongoing Treatment          Plan     Patient will continue in individual outpatient therapy with focus on improved functioning and coping skills, maintaining stability, and avoiding decompensation and the need for higher level of care.    Patient will adhere to medication regimen as prescribed and report any side effects. Patient will contact this office, call 911 or present to the nearest emergency room should suicidal or homicidal ideations occur. Provide Cognitive Behavioral Therapy and Solution Focused Therapy to improve functioning, maintain stability, and avoid decompensation  and the need for higher level of care.     Return in about Return in about 2 weeks (around 11/21/2022). or earlier if symptoms worsen or fail to improve.            This document has been electronically signed by Maria Borja LCSW  November 7, 2022 09:57 EST      Part of this note may be an electronic transcription/translation of spoken language to printed text using the Dragon Dictation System.

## 2022-11-10 ENCOUNTER — HOSPITAL ENCOUNTER (OUTPATIENT)
Dept: GENERAL RADIOLOGY | Facility: HOSPITAL | Age: 52
Discharge: HOME | End: 2022-11-10
Admitting: NURSE PRACTITIONER

## 2022-11-10 ENCOUNTER — TRANSCRIBE ORDERS (OUTPATIENT)
Dept: GENERAL RADIOLOGY | Facility: HOSPITAL | Age: 52
End: 2022-11-10

## 2022-11-10 DIAGNOSIS — Z72.0 TOBACCO ABUSE: ICD-10-CM

## 2022-11-10 DIAGNOSIS — M13.0 POLYARTHRITIS: ICD-10-CM

## 2022-11-10 DIAGNOSIS — M25.562 LEFT KNEE PAIN, UNSPECIFIED CHRONICITY: ICD-10-CM

## 2022-11-10 DIAGNOSIS — M15.0 PRIMARY GENERALIZED (OSTEO)ARTHRITIS: ICD-10-CM

## 2022-11-10 DIAGNOSIS — D89.89 OTHER SPECIFIED DISORDERS INVOLVING THE IMMUNE MECHANISM, NOT ELSEWHERE CLASSIFIED: ICD-10-CM

## 2022-11-10 DIAGNOSIS — M05.79 RHEUMATOID ARTHRITIS WITH RHEUMATOID FACTOR OF MULTIPLE SITES WITHOUT ORGAN OR SYSTEMS INVOLVEMENT: ICD-10-CM

## 2022-11-10 DIAGNOSIS — R20.2 PARESTHESIA: ICD-10-CM

## 2022-11-10 DIAGNOSIS — M79.10 MYALGIA: Primary | ICD-10-CM

## 2022-11-10 DIAGNOSIS — E11.65 TYPE 2 DIABETES MELLITUS WITH HYPERGLYCEMIA, UNSPECIFIED WHETHER LONG TERM INSULIN USE: ICD-10-CM

## 2022-11-10 DIAGNOSIS — M79.10 MYALGIA: ICD-10-CM

## 2022-11-10 PROCEDURE — 73562 X-RAY EXAM OF KNEE 3: CPT

## 2022-12-11 DIAGNOSIS — G89.29 CHRONIC MUSCULOSKELETAL PAIN: ICD-10-CM

## 2022-12-11 DIAGNOSIS — E11.42 DIABETIC PERIPHERAL NEUROPATHY: ICD-10-CM

## 2022-12-11 DIAGNOSIS — M79.18 CHRONIC MUSCULOSKELETAL PAIN: ICD-10-CM

## 2022-12-12 RX ORDER — TRAMADOL HYDROCHLORIDE 50 MG/1
TABLET ORAL
Qty: 90 TABLET | Refills: 0 | OUTPATIENT
Start: 2022-12-12

## 2022-12-12 NOTE — TELEPHONE ENCOUNTER
Rx Refill Note  Requested Prescriptions     Pending Prescriptions Disp Refills   • traMADol (ULTRAM) 50 MG tablet [Pharmacy Med Name: traMADol HCl Oral Tablet 50 MG] 90 tablet 0     Sig: TAKE 1 TABLET BY MOUTH EVERY EIGHT HOURS AS NEEDED FOR severe PAIN      Last office visit with prescribing clinician: 5/25/2022   Last telemedicine visit with prescribing clinician: Visit date not found   Next office visit with prescribing clinician: Visit date not found                         Would you like a call back once the refill request has been completed: [] Yes [] No    If the office needs to give you a call back, can they leave a voicemail: [] Yes [] No    Melanie Orozco MA  12/12/22, 08:52 EST

## 2023-04-14 NOTE — TELEPHONE ENCOUNTER
Caller: Karli Ricardo    Relationship: Self    Best call back number: [phone]    What orders are you requesting (i.e. lab or imaging): X RAY-PATIENT THINKS SHE IS HAVING PAIN IN HER LUNGS OR A KIDNEY SHOWER    In what timeframe would the patient need to come in: ASAP    Where will you receive your lab/imaging services: [facility]    Additional notes:      14-Apr-2023 09:39

## 2023-08-30 DIAGNOSIS — T36.95XA ANTIBIOTIC-INDUCED YEAST INFECTION: ICD-10-CM

## 2023-08-30 DIAGNOSIS — B37.9 ANTIBIOTIC-INDUCED YEAST INFECTION: ICD-10-CM

## 2023-08-30 RX ORDER — FLUCONAZOLE 150 MG/1
TABLET ORAL
Qty: 3 TABLET | Refills: 0 | OUTPATIENT
Start: 2023-08-30

## 2023-08-30 NOTE — TELEPHONE ENCOUNTER
Rx Refill Note  Requested Prescriptions     Pending Prescriptions Disp Refills    fluconazole (DIFLUCAN) 150 MG tablet [Pharmacy Med Name: Fluconazole Oral Tablet 150 MG] 3 tablet 0     Sig: TAKE 1 TABLET BY MOUTH EVERY SEVENTY-TWO HOURS FOR 3 DOSES      Last office visit with prescribing clinician: 5/25/2022   Last telemedicine visit with prescribing clinician: Visit date not found   Next office visit with prescribing clinician: Visit date not found                         Would you like a call back once the refill request has been completed: [] Yes [] No    If the office needs to give you a call back, can they leave a voicemail: [] Yes [] No    Melanie Orozco MA  08/30/23, 15:13 EDT

## 2023-09-22 DIAGNOSIS — R11.0 NAUSEA: ICD-10-CM

## 2023-09-22 DIAGNOSIS — K44.9 HIATAL HERNIA: ICD-10-CM

## 2023-09-22 RX ORDER — ONDANSETRON 8 MG/1
TABLET, ORALLY DISINTEGRATING ORAL
Qty: 30 TABLET | Refills: 0 | OUTPATIENT
Start: 2023-09-22

## 2023-09-22 RX ORDER — PANTOPRAZOLE SODIUM 40 MG/1
TABLET, DELAYED RELEASE ORAL
Qty: 90 TABLET | Refills: 0 | OUTPATIENT
Start: 2023-09-22

## 2023-10-04 ENCOUNTER — OFFICE VISIT (OUTPATIENT)
Dept: INTERNAL MEDICINE | Facility: CLINIC | Age: 53
End: 2023-10-04
Payer: COMMERCIAL

## 2023-10-04 VITALS
OXYGEN SATURATION: 95 % | BODY MASS INDEX: 33.22 KG/M2 | DIASTOLIC BLOOD PRESSURE: 80 MMHG | TEMPERATURE: 98.2 F | RESPIRATION RATE: 16 BRPM | HEIGHT: 64 IN | SYSTOLIC BLOOD PRESSURE: 126 MMHG | HEART RATE: 82 BPM | WEIGHT: 194.6 LBS

## 2023-10-04 DIAGNOSIS — R11.0 NAUSEA: ICD-10-CM

## 2023-10-04 DIAGNOSIS — E53.8 LOW FOLATE: ICD-10-CM

## 2023-10-04 DIAGNOSIS — E66.09 CLASS 1 OBESITY DUE TO EXCESS CALORIES WITH SERIOUS COMORBIDITY AND BODY MASS INDEX (BMI) OF 33.0 TO 33.9 IN ADULT: ICD-10-CM

## 2023-10-04 DIAGNOSIS — R79.9 ABNORMAL BLOOD CHEMISTRY: ICD-10-CM

## 2023-10-04 DIAGNOSIS — Z12.31 ENCOUNTER FOR SCREENING MAMMOGRAM FOR BREAST CANCER: ICD-10-CM

## 2023-10-04 DIAGNOSIS — Z91.199 NONCOMPLIANCE: ICD-10-CM

## 2023-10-04 DIAGNOSIS — Z87.891 PERSONAL HISTORY OF TOBACCO USE, PRESENTING HAZARDS TO HEALTH: ICD-10-CM

## 2023-10-04 DIAGNOSIS — Z51.81 MEDICATION MONITORING ENCOUNTER: ICD-10-CM

## 2023-10-04 DIAGNOSIS — Z12.2 ENCOUNTER FOR SCREENING FOR LUNG CANCER: ICD-10-CM

## 2023-10-04 DIAGNOSIS — E55.9 VITAMIN D DEFICIENCY: ICD-10-CM

## 2023-10-04 DIAGNOSIS — R06.00 DYSPNEA, UNSPECIFIED TYPE: ICD-10-CM

## 2023-10-04 DIAGNOSIS — I15.2 HYPERTENSION ASSOCIATED WITH DIABETES: ICD-10-CM

## 2023-10-04 DIAGNOSIS — M06.9 RHEUMATOID ARTHRITIS INVOLVING MULTIPLE SITES, UNSPECIFIED WHETHER RHEUMATOID FACTOR PRESENT: ICD-10-CM

## 2023-10-04 DIAGNOSIS — Z00.00 ANNUAL PHYSICAL EXAM: Primary | ICD-10-CM

## 2023-10-04 DIAGNOSIS — E11.65 TYPE 2 DIABETES MELLITUS WITH HYPERGLYCEMIA, WITH LONG-TERM CURRENT USE OF INSULIN: ICD-10-CM

## 2023-10-04 DIAGNOSIS — E03.9 ACQUIRED HYPOTHYROIDISM: ICD-10-CM

## 2023-10-04 DIAGNOSIS — Z23 NEED FOR INFLUENZA VACCINATION: ICD-10-CM

## 2023-10-04 DIAGNOSIS — G89.29 CHRONIC MUSCULOSKELETAL PAIN: ICD-10-CM

## 2023-10-04 DIAGNOSIS — M79.18 CHRONIC MUSCULOSKELETAL PAIN: ICD-10-CM

## 2023-10-04 DIAGNOSIS — G63 NEUROPATHY DUE TO MEDICAL CONDITION: ICD-10-CM

## 2023-10-04 DIAGNOSIS — Z23 NEED FOR PNEUMOCOCCAL VACCINATION: ICD-10-CM

## 2023-10-04 DIAGNOSIS — Z79.4 TYPE 2 DIABETES MELLITUS WITH HYPERGLYCEMIA, WITH LONG-TERM CURRENT USE OF INSULIN: ICD-10-CM

## 2023-10-04 DIAGNOSIS — E11.59 HYPERTENSION ASSOCIATED WITH DIABETES: ICD-10-CM

## 2023-10-04 LAB
EXPIRATION DATE: NORMAL
Lab: NORMAL
POC CREATININE URINE: 300
POC MICROALBUMIN URINE: 30

## 2023-10-04 RX ORDER — PREDNISONE 10 MG/1
TABLET ORAL
COMMUNITY

## 2023-10-04 RX ORDER — INSULIN GLARGINE 300 U/ML
75 INJECTION, SOLUTION SUBCUTANEOUS NIGHTLY
Qty: 24 ML | Refills: 1 | Status: SHIPPED | OUTPATIENT
Start: 2023-10-04

## 2023-10-04 RX ORDER — ALBUTEROL SULFATE 90 UG/1
1-2 AEROSOL, METERED RESPIRATORY (INHALATION) EVERY 6 HOURS PRN
Qty: 18 G | Refills: 5 | Status: SHIPPED | OUTPATIENT
Start: 2023-10-04

## 2023-10-04 RX ORDER — HYDROCODONE BITARTRATE AND ACETAMINOPHEN 5; 325 MG/1; MG/1
TABLET ORAL
COMMUNITY
Start: 2023-09-28

## 2023-10-04 RX ORDER — TIZANIDINE 4 MG/1
4 TABLET ORAL EVERY 8 HOURS PRN
Qty: 90 TABLET | Refills: 5 | Status: SHIPPED | OUTPATIENT
Start: 2023-10-04

## 2023-10-04 RX ORDER — ONDANSETRON 8 MG/1
8 TABLET, ORALLY DISINTEGRATING ORAL EVERY 8 HOURS PRN
Qty: 30 TABLET | Refills: 1 | Status: SHIPPED | OUTPATIENT
Start: 2023-10-04

## 2023-10-04 RX ORDER — LISINOPRIL 5 MG/1
5 TABLET ORAL NIGHTLY
Qty: 90 TABLET | Refills: 1 | Status: SHIPPED | OUTPATIENT
Start: 2023-10-04

## 2023-10-04 RX ORDER — INSULIN LISPRO 200 [IU]/ML
25-35 INJECTION, SOLUTION SUBCUTANEOUS
Qty: 48 ML | Refills: 1 | Status: SHIPPED | OUTPATIENT
Start: 2023-10-04

## 2023-10-04 RX ORDER — FLUCONAZOLE 150 MG/1
TABLET ORAL
Qty: 2 TABLET | Refills: 5 | Status: SHIPPED | OUTPATIENT
Start: 2023-10-04

## 2023-10-04 NOTE — PATIENT INSTRUCTIONS
Health Maintenance for Postmenopausal Women  Menopause is a normal process in which your reproductive ability comes to an end. This process happens gradually over a span of months to years, usually between the ages of 48 and 55. Menopause is complete when you have missed 12 consecutive menstrual periods.  It is important to talk with your health care provider about some of the most common conditions that affect postmenopausal women, such as heart disease, cancer, and bone loss (osteoporosis). Adopting a healthy lifestyle and getting preventive care can help to promote your health and wellness. Those actions can also lower your chances of developing some of these common conditions.  What should I know about menopause?  During menopause, you may experience a number of symptoms, such as:  Moderate-to-severe hot flashes.  Night sweats.  Decrease in sex drive.  Mood swings.  Headaches.  Tiredness.  Irritability.  Memory problems.  Insomnia.     Choosing to treat or not to treat menopausal changes is an individual decision that you make with your health care provider.  What should I know about hormone replacement therapy and supplements?  Hormone therapy products are effective for treating symptoms that are associated with menopause, such as hot flashes and night sweats. Hormone replacement carries certain risks, especially as you become older. If you are thinking about using estrogen or estrogen with progestin treatments, discuss the benefits and risks with your health care provider.  What should I know about heart disease and stroke?  Heart disease, heart attack, and stroke become more likely as you age. This may be due, in part, to the hormonal changes that your body experiences during menopause. These can affect how your body processes dietary fats, triglycerides, and cholesterol. Heart attack and stroke are both medical emergencies.    There are many things that you can do to help prevent heart disease and  stroke:  Have your blood pressure checked at least every 1-2 years. High blood pressure causes heart disease and increases the risk of stroke.  If you are 55-79 years old, ask your health care provider if you should take aspirin to prevent a heart attack or a stroke.  Do not use any tobacco products, including cigarettes, chewing tobacco, or electronic cigarettes. If you need help quitting, ask your health care provider.  It is important to eat a healthy diet and maintain a healthy weight.  Be sure to include plenty of vegetables, fruits, low-fat dairy products, and lean protein.  Avoid eating foods that are high in solid fats, added sugars, or salt (sodium).  Get regular exercise. This is one of the most important things that you can do for your health.  Try to exercise for at least 150 minutes each week. The type of exercise that you do should increase your heart rate and make you sweat. This is known as moderate-intensity exercise.  Try to do strengthening exercises at least twice each week. Do these in addition to the moderate-intensity exercise.  Know your numbers. Ask your health care provider to check your cholesterol and your blood glucose. Continue to have your blood tested as directed by your health care provider.     What should I know about cancer screening?  There are several types of cancer. Take the following steps to reduce your risk and to catch any cancer development as early as possible.  Breast Cancer  Practice breast self-awareness.  This means understanding how your breasts normally appear and feel.  It also means doing regular breast self-exams. Let your health care provider know about any changes, no matter how small.  If you are 40 or older, have a clinician do a breast exam (clinical breast exam or CBE) every year. Depending on your age, family history, and medical history, it may be recommended that you also have a yearly breast X-ray (mammogram).  If you have a family history of breast  cancer, talk with your health care provider about genetic screening.  If you are at high risk for breast cancer, talk with your health care provider about having an MRI and a mammogram every year.  Breast cancer (BRCA) gene test is recommended for women who have family members with BRCA-related cancers. Results of the assessment will determine the need for genetic counseling and BRCA1 and for BRCA2 testing. BRCA-related cancers include these types:  Breast. This occurs in males or females.  Ovarian.  Tubal. This may also be called fallopian tube cancer.  Cancer of the abdominal or pelvic lining (peritoneal cancer).  Prostate.  Pancreatic.     Cervical, Uterine, and Ovarian Cancer  Your health care provider may recommend that you be screened regularly for cancer of the pelvic organs. These include your ovaries, uterus, and vagina. This screening involves a pelvic exam, which includes checking for microscopic changes to the surface of your cervix (Pap test).  For women ages 21-65, health care providers may recommend a pelvic exam and a Pap test every three years. For women ages 30-65, they may recommend the Pap test and pelvic exam, combined with testing for human papilloma virus (HPV), every five years. Some types of HPV increase your risk of cervical cancer. Testing for HPV may also be done on women of any age who have unclear Pap test results.  Other health care providers may not recommend any screening for nonpregnant women who are considered low risk for pelvic cancer and have no symptoms. Ask your health care provider if a screening pelvic exam is right for you.  If you have had past treatment for cervical cancer or a condition that could lead to cancer, you need Pap tests and screening for cancer for at least 20 years after your treatment. If Pap tests have been discontinued for you, your risk factors (such as having a new sexual partner) need to be reassessed to determine if you should start having screenings  again. Some women have medical problems that increase the chance of getting cervical cancer. In these cases, your health care provider may recommend that you have screening and Pap tests more often.  If you have a family history of uterine cancer or ovarian cancer, talk with your health care provider about genetic screening.  If you have vaginal bleeding after reaching menopause, tell your health care provider.  There are currently no reliable tests available to screen for ovarian cancer.     Lung Cancer  Lung cancer screening is recommended for adults 55-80 years old who are at high risk for lung cancer because of a history of smoking. A yearly low-dose CT scan of the lungs is recommended if you:  Currently smoke.  Have a history of at least 30 pack-years of smoking and you currently smoke or have quit within the past 15 years. A pack-year is smoking an average of one pack of cigarettes per day for one year.     Yearly screening should:  Continue until it has been 15 years since you quit.  Stop if you develop a health problem that would prevent you from having lung cancer treatment.     Colorectal Cancer  This type of cancer can be detected and can often be prevented.  Routine colorectal cancer screening usually begins at age 50 and continues through age 75.  If you have risk factors for colon cancer, your health care provider may recommend that you be screened at an earlier age.  If you have a family history of colorectal cancer, talk with your health care provider about genetic screening.  Your health care provider may also recommend using home test kits to check for hidden blood in your stool.  A small camera at the end of a tube can be used to examine your colon directly (sigmoidoscopy or colonoscopy). This is done to check for the earliest forms of colorectal cancer.  Direct examination of the colon should be repeated every 5-10 years until age 75. However, if early forms of precancerous polyps or small  growths are found or if you have a family history or genetic risk for colorectal cancer, you may need to be screened more often.     Skin Cancer  Check your skin from head to toe regularly.  Monitor any moles. Be sure to tell your health care provider:  About any new moles or changes in moles, especially if there is a change in a mole's shape or color.  If you have a mole that is larger than the size of a pencil eraser.  If any of your family members has a history of skin cancer, especially at a young age, talk with your health care provider about genetic screening.  Always use sunscreen. Apply sunscreen liberally and repeatedly throughout the day.  Whenever you are outside, protect yourself by wearing long sleeves, pants, a wide-brimmed hat, and sunglasses.     What should I know about osteoporosis?  Osteoporosis is a condition in which bone destruction happens more quickly than new bone creation. After menopause, you may be at an increased risk for osteoporosis. To help prevent osteoporosis or the bone fractures that can happen because of osteoporosis, the following is recommended:  If you are 19-50 years old, get at least 1,000 mg of calcium and at least 600 mg of vitamin D per day.  If you are older than age 50 but younger than age 70, get at least 1,200 mg of calcium and at least 600 mg of vitamin D per day.  If you are older than age 70, get at least 1,200 mg of calcium and at least 800 mg of vitamin D per day.     Smoking and excessive alcohol intake increase the risk of osteoporosis. Eat foods that are rich in calcium and vitamin D, and do weight-bearing exercises several times each week as directed by your health care provider.  What should I know about how menopause affects my mental health?  Depression may occur at any age, but it is more common as you become older. Common symptoms of depression include:  Low or sad mood.  Changes in sleep patterns.  Changes in appetite or eating patterns.  Feeling an  overall lack of motivation or enjoyment of activities that you previously enjoyed.  Frequent crying spells.     Talk with your health care provider if you think that you are experiencing depression.  What should I know about immunizations?  It is important that you get and maintain your immunizations. These include:  Tetanus, diphtheria, and pertussis (Tdap) booster vaccine.  Influenza every year before the flu season begins.  Pneumonia vaccine.  Shingles vaccine.     Your health care provider may also recommend other immunizations.  This information is not intended to replace advice given to you by your health care provider. Make sure you discuss any questions you have with your health care provider.  Document Released: 02/09/2007 Document Revised: 07/07/2017 Document Reviewed: 09/20/2016  [employer] Interactive Patient Education © 2018 [employer]

## 2023-10-04 NOTE — PROGRESS NOTES
10/04/2023  Chief Complaint   Patient presents with    Annual Exam    Diabetes       Patient Care Team:  Lovely Loyd MD as PCP - General (Family Medicine)  Madina Valencia MD as Consulting Physician (Endocrinology)  Maria Borja LCSW as  (Psychiatry)  Lopez Brooks APRN (Pain Medicine)  Red Torres MD as Consulting Physician (Rheumatology)]       Karli Ricardo is here for her annual preventive exam. History per MA reviewed.  Last visit was 5/25/22. Pt reports lot of stress, lot going on in life. On disability. Not able to do much, severe pain. Going to rheumatology (Dr. Torres) but would like second opinion. Also being followed by pain management. Stopped metformin as it seemed to bother her stomach. Hasn't followed up with endocrinology for diabetes mellitus. Pt admits she's not been taking care of herself but in so much pain she states.         Health Maintenance   Topic Date Due    ANNUAL PHYSICAL  Never done    MAMMOGRAM  05/17/2020    LUNG CANCER SCREENING  06/09/2022    HEMOGLOBIN A1C  11/25/2022    TDAP/TD VACCINES (1 - Tdap) 10/04/2023 (Originally [birth year]/1987)    DIABETIC FOOT EXAM  10/19/2023 (Originally 4/3/2018)    DIABETIC EYE EXAM  11/22/2023 (Originally 9/13/2016)    ZOSTER VACCINE (1 of 2) 10/01/2029 (Originally 6/24/2018)    COVID-19 Vaccine (3 - 2023-24 season) 12/12/2112 (Originally 9/1/2023)    URINE MICROALBUMIN  10/04/2024    BMI FOLLOWUP  10/04/2024    COLORECTAL CANCER SCREENING  06/27/2025    HEPATITIS C SCREENING  Completed    Pneumococcal Vaccine 0-64  Completed    INFLUENZA VACCINE  Completed    Hepatitis B  Discontinued    PAP SMEAR  Discontinued       Immunization History   Administered Date(s) Administered    COVID-19 (PFIZER) Purple Cap Monovalent 05/22/2021, 06/20/2021    Flu Vaccine Intradermal Quad 18-64YR 01/07/2019, 10/01/2019    Fluzone (or Fluarix & Flulaval for VFC) >6mos 10/04/2023    Fluzone Quad >6mos (Multi-dose) 10/13/2016, 11/06/2017  "   Hepatitis A 12/13/2018    Influenza Quad Vaccine (Inpatient) 11/06/2017    Pneumococcal Conjugate 20-Valent (PCV20) 10/04/2023    Pneumococcal Polysaccharide (PPSV23) 04/03/2017    flucelvax quad pfs =>4 YRS 01/07/2019, 10/01/2019         The following portions of the patient's history were reviewed and updated as appropriate: allergies, current medications, past family history, past medical history, past social history, past surgical history and problem list.    Objective   Visit Vitals  /80 (BP Location: Left arm, Patient Position: Sitting, Cuff Size: Adult)   Pulse 82   Temp 98.2 °F (36.8 °C) (Temporal)   Resp 16   Ht 161.3 cm (63.5\")   Wt 88.3 kg (194 lb 9.6 oz)   SpO2 95%   BMI 33.93 kg/m²        Physical Exam  Vitals and nursing note reviewed.   Constitutional:       General: She is not in acute distress.     Appearance: Normal appearance. She is well-developed and well-groomed. She is obese. She is not ill-appearing, toxic-appearing or diaphoretic.   HENT:      Head: Normocephalic and atraumatic.      Right Ear: Hearing, tympanic membrane, ear canal and external ear normal.      Left Ear: Hearing, tympanic membrane, ear canal and external ear normal.      Nose: Nose normal.      Mouth/Throat:      Mouth: Mucous membranes are moist.   Eyes:      General: Lids are normal. No scleral icterus.     Extraocular Movements: Extraocular movements intact.   Neck:      Trachea: Phonation normal.   Cardiovascular:      Rate and Rhythm: Normal rate and regular rhythm.   Pulmonary:      Effort: Pulmonary effort is normal.   Abdominal:      General: Abdomen is flat. Bowel sounds are normal. There is no distension.      Tenderness: There is no abdominal tenderness.   Musculoskeletal:         General: No deformity.      Cervical back: Neck supple.   Skin:     General: Skin is warm.      Coloration: Skin is not jaundiced or pale.   Neurological:      General: No focal deficit present.      Mental Status: She is alert " and oriented to person, place, and time.      Motor: Motor function is intact.   Psychiatric:         Attention and Perception: Attention and perception normal.         Mood and Affect: Mood and affect normal.         Speech: Speech normal.         Behavior: Behavior normal. Behavior is cooperative.         Thought Content: Thought content normal.         Cognition and Memory: Cognition and memory normal.         Judgment: Judgment normal.       Lab Results   Component Value Date    CHLPL 168 08/01/2018    TRIG 401 (H) 11/24/2020    HDL 29 (L) 11/24/2020    LDL 78 11/24/2020     Lab Results   Component Value Date    TSH 3.350 04/15/2021     Lab Results   Component Value Date    FREET4 0.92 (L) 04/15/2021     Lab Results   Component Value Date    HGBA1C 13 05/25/2022    HGBA1C 13.1 01/06/2022    HGBA1C 10.1 03/19/2021     Office Visit on 10/04/2023   Component Date Value Ref Range Status    Microalbumin, Urine 10/04/2023 30   Final    Creatinine, Urine 10/04/2023 300   Final    Lot Number 10/04/2023 [id number]   Final    Expiration Date 10/04/2023 09/30/2024   Final        Assessment   Diagnoses and all orders for this visit:    1. Annual physical exam (Primary)    2. Type 2 diabetes mellitus with hyperglycemia, with long-term current use of insulin  Comments:  patient needs to follow up with endocrinology; she reports weight loss--could be due to uncontrolled DM  Orders:  -     Cancel: POCT microalbumin  -     POC Microalbumin  -     Hemoglobin A1c  -     Lipid Panel  -     TSH+Free T4  -     lisinopril (PRINIVIL,ZESTRIL) 5 MG tablet; Take 1 tablet by mouth Every Night. For high blood pressure and kidney protection from diabetes mellitus.  Dispense: 90 tablet; Refill: 1  -     Insulin Lispro (HumaLOG KwikPen) 200 UNIT/ML solution pen-injector; Inject 25-35 Units under the skin into the appropriate area as directed 3 (Three) Times a Day With Meals.  Dispense: 48 mL; Refill: 1  -     Insulin Glargine, 1 Unit Dial,  (Toujeo SoloStar) 300 UNIT/ML solution pen-injector injection; Inject 75 Units under the skin into the appropriate area as directed Every Night.  Dispense: 24 mL; Refill: 1  -     fluconazole (DIFLUCAN) 150 MG tablet; Take one tablet at onset of symptoms, then repeat 3 days later.  Dispense: 2 tablet; Refill: 5    3. Rheumatoid arthritis involving multiple sites, unspecified whether rheumatoid factor present  Comments:  second opinion order  Orders:  -     Ambulatory Referral to Rheumatology    4. Class 1 obesity due to excess calories with serious comorbidity and body mass index (BMI) of 33.0 to 33.9 in adult  -     Vitamin D,25-Hydroxy    5. Hypertension associated with diabetes  -     lisinopril (PRINIVIL,ZESTRIL) 5 MG tablet; Take 1 tablet by mouth Every Night. For high blood pressure and kidney protection from diabetes mellitus.  Dispense: 90 tablet; Refill: 1    6. Dyspnea, unspecified type  -     albuterol sulfate  (90 Base) MCG/ACT inhaler; Inhale 1-2 puffs Every 6 (Six) Hours As Needed for Wheezing or Shortness of Air.  Dispense: 18 g; Refill: 5    7. Low folate  -     CBC (No Diff)  -     Folate  -     Vitamin B12  -     Vitamin B6    8. Acquired hypothyroidism  Comments:  will refill levothyroxine pending lab review  Orders:  -     TSH+Free T4    9. Neuropathy due to medical condition  -     Hemoglobin A1c  -     TSH+Free T4  -     CBC (No Diff)  -     Comprehensive Metabolic Panel  -     Folate  -     Vitamin B12  -     Vitamin B6    10. Vitamin D deficiency  -     Vitamin D,25-Hydroxy    11. Noncompliance  Comments:  last visit was May 2022; last a1c 13 or greater multiple times; stopped metformin on her own. Compliance needed in order to improve health.    12. Nausea  -     ondansetron ODT (Zofran ODT) 8 MG disintegrating tablet; Place 1 tablet on the tongue Every 8 (Eight) Hours As Needed for Nausea or Vomiting.  Dispense: 30 tablet; Refill: 1    13. Chronic musculoskeletal pain  -      tiZANidine (ZANAFLEX) 4 MG tablet; Take 1 tablet by mouth Every 8 (Eight) Hours As Needed for Muscle Spasms.  Dispense: 90 tablet; Refill: 5    14. Encounter for screening mammogram for breast cancer  -     Mammo Screening Digital Tomosynthesis Bilateral With CAD; Future    15. Personal history of tobacco use, presenting hazards to health  -      CT Chest Low Dose Cancer Screening WO; Future    16. Encounter for screening for lung cancer  -      CT Chest Low Dose Cancer Screening WO; Future    17. Abnormal blood chemistry  -     Hemoglobin A1c  -     Lipid Panel  -     TSH+Free T4  -     Vitamin D,25-Hydroxy  -     CBC (No Diff)  -     Comprehensive Metabolic Panel  -     Folate  -     Vitamin B12  -     Vitamin B6  -     Vitamin B1, Whole Blood    18. Medication monitoring encounter  -     Hemoglobin A1c  -     Lipid Panel  -     TSH+Free T4  -     Vitamin D,25-Hydroxy  -     CBC (No Diff)  -     Comprehensive Metabolic Panel  -     Folate  -     Vitamin B12  -     Vitamin B6  -     Vitamin B1, Whole Blood    19. Need for influenza vaccination  -     Fluzone (or Fluarix & Flulaval for VFC) >6 Mos ([id number])    20. Need for pneumococcal vaccination  -     Pneumococcal Conjugate Vaccine 20-Valent (PCV20)         Health maintenance information provided via patient plan (after visit summary).   Counseled on age appropriate health screenings and immunizations.  Encouraged exercise and healthy diet.      BMI is >= 30 and <35. (Class 1 Obesity). The following options were offered after discussion;: Information on healthy weight added to patient's after visit summary.        I spent 15 minutes on the separately reported service of 63356. This time is not included in the time used to support the E/M service also reported today.     Return in about 3 months (around 1/7/2024) for Diabetes follow up.     Lovely Loyd MD

## 2023-10-06 DIAGNOSIS — E55.9 VITAMIN D DEFICIENCY: Primary | ICD-10-CM

## 2023-10-06 RX ORDER — CHOLECALCIFEROL (VITAMIN D3) 1250 MCG
50000 CAPSULE ORAL
Qty: 6 CAPSULE | Refills: 0 | Status: SHIPPED | OUTPATIENT
Start: 2023-10-06 | End: 2023-11-11

## 2023-10-06 RX ORDER — LEVOTHYROXINE SODIUM 0.05 MG/1
50 TABLET ORAL
Qty: 90 TABLET | Refills: 1 | Status: SHIPPED | OUTPATIENT
Start: 2023-10-06

## 2023-10-09 LAB
25(OH)D3+25(OH)D2 SERPL-MCNC: 21.4 NG/ML (ref 30–100)
ALBUMIN SERPL-MCNC: 3.9 G/DL (ref 3.8–4.9)
ALBUMIN/GLOB SERPL: 1.3 {RATIO} (ref 1.2–2.2)
ALP SERPL-CCNC: 98 IU/L (ref 44–121)
ALT SERPL-CCNC: 15 IU/L (ref 0–32)
AST SERPL-CCNC: 22 IU/L (ref 0–40)
BILIRUB SERPL-MCNC: 0.3 MG/DL (ref 0–1.2)
BUN SERPL-MCNC: 9 MG/DL (ref 6–24)
BUN/CREAT SERPL: 11 (ref 9–23)
CALCIUM SERPL-MCNC: 9.5 MG/DL (ref 8.7–10.2)
CHLORIDE SERPL-SCNC: 99 MMOL/L (ref 96–106)
CHOLEST SERPL-MCNC: 189 MG/DL (ref 100–199)
CO2 SERPL-SCNC: 25 MMOL/L (ref 20–29)
CREAT SERPL-MCNC: 0.79 MG/DL (ref 0.57–1)
EGFRCR SERPLBLD CKD-EPI 2021: 88 ML/MIN/1.73
ERYTHROCYTE [DISTWIDTH] IN BLOOD BY AUTOMATED COUNT: 13.5 % (ref 11.7–15.4)
FOLATE SERPL-MCNC: 4.2 NG/ML
GLOBULIN SER CALC-MCNC: 3.1 G/DL (ref 1.5–4.5)
GLUCOSE SERPL-MCNC: 223 MG/DL (ref 70–99)
HBA1C MFR BLD: 9.1 % (ref 4.8–5.6)
HCT VFR BLD AUTO: 49 % (ref 34–46.6)
HDLC SERPL-MCNC: 30 MG/DL
HGB BLD-MCNC: 16.6 G/DL (ref 11.1–15.9)
LDLC SERPL CALC-MCNC: 107 MG/DL (ref 0–99)
MCH RBC QN AUTO: 29.1 PG (ref 26.6–33)
MCHC RBC AUTO-ENTMCNC: 33.9 G/DL (ref 31.5–35.7)
MCV RBC AUTO: 86 FL (ref 79–97)
PLATELET # BLD AUTO: 262 X10E3/UL (ref 150–450)
POTASSIUM SERPL-SCNC: 4.6 MMOL/L (ref 3.5–5.2)
PROT SERPL-MCNC: 7 G/DL (ref 6–8.5)
PYRIDOXAL PHOS SERPL-MCNC: 4 UG/L (ref 3.4–65.2)
RBC # BLD AUTO: 5.7 X10E6/UL (ref 3.77–5.28)
SODIUM SERPL-SCNC: 138 MMOL/L (ref 134–144)
T4 FREE SERPL-MCNC: 0.98 NG/DL (ref 0.82–1.77)
TRIGL SERPL-MCNC: 301 MG/DL (ref 0–149)
TSH SERPL DL<=0.005 MIU/L-ACNC: 4.77 UIU/ML (ref 0.45–4.5)
VIT B1 BLD-SCNC: 149.6 NMOL/L (ref 66.5–200)
VIT B12 SERPL-MCNC: 339 PG/ML (ref 232–1245)
VLDLC SERPL CALC-MCNC: 52 MG/DL (ref 5–40)
WBC # BLD AUTO: 11.2 X10E3/UL (ref 3.4–10.8)

## 2023-11-02 ENCOUNTER — HOSPITAL ENCOUNTER (OUTPATIENT)
Dept: MAMMOGRAPHY | Facility: HOSPITAL | Age: 53
Discharge: HOME | End: 2023-11-02
Payer: COMMERCIAL

## 2024-08-27 ENCOUNTER — APPOINTMENT (OUTPATIENT)
Dept: CT IMAGING | Facility: HOSPITAL | Age: 54
End: 2024-08-27
Payer: COMMERCIAL

## 2024-08-27 ENCOUNTER — HOSPITAL ENCOUNTER (EMERGENCY)
Facility: HOSPITAL | Age: 54
Discharge: HOME | End: 2024-08-28
Attending: STUDENT IN AN ORGANIZED HEALTH CARE EDUCATION/TRAINING PROGRAM
Payer: COMMERCIAL

## 2024-08-27 DIAGNOSIS — E86.0 MILD DEHYDRATION: ICD-10-CM

## 2024-08-27 DIAGNOSIS — R10.31 RIGHT LOWER QUADRANT ABDOMINAL PAIN: Primary | ICD-10-CM

## 2024-08-27 LAB
ALBUMIN SERPL-MCNC: 4 G/DL (ref 3.5–5.2)
ALBUMIN/GLOB SERPL: 1 G/DL
ALP SERPL-CCNC: 105 U/L (ref 39–117)
ALT SERPL W P-5'-P-CCNC: 27 U/L (ref 1–33)
ANION GAP SERPL CALCULATED.3IONS-SCNC: 8.5 MMOL/L (ref 5–15)
AST SERPL-CCNC: 24 U/L (ref 1–32)
BASOPHILS # BLD AUTO: 0.07 10*3/MM3 (ref 0–0.2)
BASOPHILS NFR BLD AUTO: 0.7 % (ref 0–1.5)
BILIRUB SERPL-MCNC: 0.4 MG/DL (ref 0–1.2)
BILIRUB UR QL STRIP: NEGATIVE
BUN SERPL-MCNC: 10 MG/DL (ref 6–20)
BUN/CREAT SERPL: 11.9 (ref 7–25)
CALCIUM SPEC-SCNC: 9.5 MG/DL (ref 8.6–10.5)
CHLORIDE SERPL-SCNC: 101 MMOL/L (ref 98–107)
CLARITY UR: CLEAR
CO2 SERPL-SCNC: 29.5 MMOL/L (ref 22–29)
COLOR UR: YELLOW
CREAT SERPL-MCNC: 0.84 MG/DL (ref 0.57–1)
D-LACTATE SERPL-SCNC: 2 MMOL/L (ref 0.5–2)
DEPRECATED RDW RBC AUTO: 39.8 FL (ref 37–54)
EGFRCR SERPLBLD CKD-EPI 2021: 81.7 ML/MIN/1.73
EOSINOPHIL # BLD AUTO: 0.12 10*3/MM3 (ref 0–0.4)
EOSINOPHIL NFR BLD AUTO: 1.3 % (ref 0.3–6.2)
ERYTHROCYTE [DISTWIDTH] IN BLOOD BY AUTOMATED COUNT: 12.8 % (ref 12.3–15.4)
GLOBULIN UR ELPH-MCNC: 3.9 GM/DL
GLUCOSE SERPL-MCNC: 222 MG/DL (ref 65–99)
GLUCOSE UR STRIP-MCNC: NEGATIVE MG/DL
HCT VFR BLD AUTO: 50.3 % (ref 34–46.6)
HGB BLD-MCNC: 17 G/DL (ref 12–15.9)
HGB UR QL STRIP.AUTO: NEGATIVE
HOLD SPECIMEN: NORMAL
HOLD SPECIMEN: NORMAL
IMM GRANULOCYTES # BLD AUTO: 0.04 10*3/MM3 (ref 0–0.05)
IMM GRANULOCYTES NFR BLD AUTO: 0.4 % (ref 0–0.5)
KETONES UR QL STRIP: NEGATIVE
LEUKOCYTE ESTERASE UR QL STRIP.AUTO: NEGATIVE
LIPASE SERPL-CCNC: 24 U/L (ref 13–60)
LYMPHOCYTES # BLD AUTO: 3.39 10*3/MM3 (ref 0.7–3.1)
LYMPHOCYTES NFR BLD AUTO: 35.7 % (ref 19.6–45.3)
MCH RBC QN AUTO: 29 PG (ref 26.6–33)
MCHC RBC AUTO-ENTMCNC: 33.8 G/DL (ref 31.5–35.7)
MCV RBC AUTO: 85.7 FL (ref 79–97)
MONOCYTES # BLD AUTO: 0.67 10*3/MM3 (ref 0.1–0.9)
MONOCYTES NFR BLD AUTO: 7.1 % (ref 5–12)
NEUTROPHILS NFR BLD AUTO: 5.21 10*3/MM3 (ref 1.7–7)
NEUTROPHILS NFR BLD AUTO: 54.8 % (ref 42.7–76)
NITRITE UR QL STRIP: NEGATIVE
NRBC BLD AUTO-RTO: 0 /100 WBC (ref 0–0.2)
PH UR STRIP.AUTO: 6.5 [PH] (ref 5–8)
PLATELET # BLD AUTO: 230 10*3/MM3 (ref 140–450)
PMV BLD AUTO: 9.7 FL (ref 6–12)
POTASSIUM SERPL-SCNC: 4 MMOL/L (ref 3.5–5.2)
PROT SERPL-MCNC: 7.9 G/DL (ref 6–8.5)
PROT UR QL STRIP: NEGATIVE
RBC # BLD AUTO: 5.87 10*6/MM3 (ref 3.77–5.28)
SODIUM SERPL-SCNC: 139 MMOL/L (ref 136–145)
SP GR UR STRIP: >=1.03 (ref 1–1.03)
UROBILINOGEN UR QL STRIP: NORMAL
WBC NRBC COR # BLD AUTO: 9.5 10*3/MM3 (ref 3.4–10.8)
WHOLE BLOOD HOLD COAG: NORMAL
WHOLE BLOOD HOLD SPECIMEN: NORMAL

## 2024-08-27 PROCEDURE — 25510000001 IOPAMIDOL 61 % SOLUTION: Performed by: STUDENT IN AN ORGANIZED HEALTH CARE EDUCATION/TRAINING PROGRAM

## 2024-08-27 PROCEDURE — 36415 COLL VENOUS BLD VENIPUNCTURE: CPT

## 2024-08-27 PROCEDURE — 96375 TX/PRO/DX INJ NEW DRUG ADDON: CPT

## 2024-08-27 PROCEDURE — 99285 EMERGENCY DEPT VISIT HI MDM: CPT

## 2024-08-27 PROCEDURE — 83690 ASSAY OF LIPASE: CPT | Performed by: STUDENT IN AN ORGANIZED HEALTH CARE EDUCATION/TRAINING PROGRAM

## 2024-08-27 PROCEDURE — 80053 COMPREHEN METABOLIC PANEL: CPT | Performed by: STUDENT IN AN ORGANIZED HEALTH CARE EDUCATION/TRAINING PROGRAM

## 2024-08-27 PROCEDURE — 96374 THER/PROPH/DIAG INJ IV PUSH: CPT

## 2024-08-27 PROCEDURE — 83605 ASSAY OF LACTIC ACID: CPT | Performed by: STUDENT IN AN ORGANIZED HEALTH CARE EDUCATION/TRAINING PROGRAM

## 2024-08-27 PROCEDURE — 25010000002 MORPHINE PER 10 MG: Performed by: STUDENT IN AN ORGANIZED HEALTH CARE EDUCATION/TRAINING PROGRAM

## 2024-08-27 PROCEDURE — 85025 COMPLETE CBC W/AUTO DIFF WBC: CPT

## 2024-08-27 PROCEDURE — 25810000003 SODIUM CHLORIDE 0.9 % SOLUTION: Performed by: STUDENT IN AN ORGANIZED HEALTH CARE EDUCATION/TRAINING PROGRAM

## 2024-08-27 PROCEDURE — 25010000002 ONDANSETRON PER 1 MG: Performed by: STUDENT IN AN ORGANIZED HEALTH CARE EDUCATION/TRAINING PROGRAM

## 2024-08-27 PROCEDURE — 74177 CT ABD & PELVIS W/CONTRAST: CPT

## 2024-08-27 PROCEDURE — 81003 URINALYSIS AUTO W/O SCOPE: CPT | Performed by: STUDENT IN AN ORGANIZED HEALTH CARE EDUCATION/TRAINING PROGRAM

## 2024-08-27 RX ORDER — ONDANSETRON 2 MG/ML
4 INJECTION INTRAMUSCULAR; INTRAVENOUS ONCE
Status: COMPLETED | OUTPATIENT
Start: 2024-08-27 | End: 2024-08-27

## 2024-08-27 RX ORDER — NALOXONE HCL 8 MG/.1ML
SPRAY NASAL
COMMUNITY

## 2024-08-27 RX ORDER — SODIUM CHLORIDE 0.9 % (FLUSH) 0.9 %
10 SYRINGE (ML) INJECTION AS NEEDED
Status: DISCONTINUED | OUTPATIENT
Start: 2024-08-27 | End: 2024-08-28 | Stop reason: HOSPADM

## 2024-08-27 RX ORDER — IOPAMIDOL 612 MG/ML
100 INJECTION, SOLUTION INTRAVASCULAR
Status: COMPLETED | OUTPATIENT
Start: 2024-08-27 | End: 2024-08-27

## 2024-08-27 RX ORDER — TRIAMCINOLONE ACETONIDE 1 MG/G
CREAM TOPICAL
COMMUNITY
Start: 2024-04-17

## 2024-08-27 RX ORDER — MEDROXYPROGESTERONE ACETATE 150 MG/ML
INJECTION, SUSPENSION INTRAMUSCULAR
COMMUNITY
Start: 2024-04-17

## 2024-08-27 RX ORDER — TRAMADOL HYDROCHLORIDE 50 MG/1
TABLET ORAL
COMMUNITY
Start: 2024-08-23

## 2024-08-27 RX ORDER — LIDOCAINE 50 MG/G
PATCH TOPICAL
COMMUNITY
Start: 2024-04-25

## 2024-08-27 RX ADMIN — IOPAMIDOL 100 ML: 612 INJECTION, SOLUTION INTRAVENOUS at 22:57

## 2024-08-27 RX ADMIN — MORPHINE SULFATE 4 MG: 4 INJECTION, SOLUTION INTRAMUSCULAR; INTRAVENOUS at 22:38

## 2024-08-27 RX ADMIN — SODIUM CHLORIDE 1000 ML: 9 INJECTION, SOLUTION INTRAVENOUS at 22:35

## 2024-08-27 RX ADMIN — ONDANSETRON 4 MG: 2 INJECTION INTRAMUSCULAR; INTRAVENOUS at 22:37

## 2024-08-28 VITALS
SYSTOLIC BLOOD PRESSURE: 138 MMHG | OXYGEN SATURATION: 95 % | WEIGHT: 206 LBS | DIASTOLIC BLOOD PRESSURE: 67 MMHG | HEART RATE: 68 BPM | TEMPERATURE: 98.4 F | BODY MASS INDEX: 36.5 KG/M2 | RESPIRATION RATE: 20 BRPM | HEIGHT: 63 IN

## 2024-08-28 NOTE — ED NOTES
Pt discharged home in stable condition. Pt left via w/c accompanied per S/O. Pt a&o at time of discharge.

## 2024-08-28 NOTE — DISCHARGE INSTRUCTIONS
Follow-up closely with primary care provider.  Continue drink plenty of fluids to help with mild dehydration seen on lab workup today.  Return to emergency department if symptoms continue to significantly worsen despite treatment.   Implemented All Universal Safety Interventions:  [place] to call system. Call bell, personal items and telephone within reach. Instruct patient to call for assistance. Room bathroom lighting operational. Non-slip footwear when patient is off stretcher. Physically safe environment: no spills, clutter or unnecessary equipment. Stretcher in lowest position, wheels locked, appropriate side rails in place.

## 2024-08-28 NOTE — ED PROVIDER NOTES
EMERGENCY DEPARTMENT ENCOUNTER    Pt Name: Karli Ricardo  MRN: [id number]  Pt :   1970  Room Number:    Date of encounter:  2024  PCP: Lovely Loyd MD  ED Provider: Nicolas Petit MD    Historian: Patient      HPI:  Chief Complaint: Abdominal pain        Context: Karli Ricardo is a 54 y.o. female who presents to the ED c/o abdominal pain.  Patient states she has had pain in her right abdomen for the past 2 days.  Radiates to her right groin area.  States that pain is worse with ambulation.  Over-the-counter pain medications along with patient's previously prescribed medications are not helping.  Denies any dysuria or blood in urine that she has noticed.  Has had intermittent diarrhea.      PAST MEDICAL HISTORY  Past Medical History:   Diagnosis Date    Acid reflux     Arthritis     Asthma     Chronic bronchitis     Colon polyp 2015    Diabetes     Diverticulitis     Diverticulosis 2017    Fracture     lateral left ankle    Gallstones     Gout     Headache 2016    Hypertension     Ileitis 2019    Kidney stone 2022    Passed    Low back pain     Neuromuscular disorder 2006    Thyroid disease     Type 2 diabetes mellitus     Visual impairment 2014         PAST SURGICAL HISTORY  Past Surgical History:   Procedure Laterality Date     SECTION      CHOLECYSTECTOMY      COLONOSCOPY  ,    DR LYLES,DR MELENDEZ    COLONOSCOPY N/A 2022    Procedure: COLONOSCOPY with hot biopsy polypectomy x 5 and hot snare polypectomy x2;  Surgeon: Jesus Melendez MD;  Location: [facility] ENDOSCOPY;  Service: Gastroenterology;  Laterality: N/A;    DILATATION AND CURETTAGE      ENDOSCOPY      DR MELENDEZ BEREA    FOOT SURGERY      HYSTERECTOMY      UPPER GASTROINTESTINAL ENDOSCOPY  2015         FAMILY HISTORY  Family History   Problem Relation Age of Onset    Arthritis Mother     Mental illness Mother     Migraines Mother     Osteoporosis Mother     Thyroid  disease Mother     Depression Mother     Heart disease Mother     Colon cancer Father         passed away at age 53    Cancer Father     Thyroid disease Sister          SOCIAL HISTORY  Social History     Socioeconomic History    Marital status:    Tobacco Use    Smoking status: Every Day     Current packs/day: 1.00     Average packs/day: 1 pack/day for 20.0 years (20.0 ttl pk-yrs)     Types: Cigarettes    Smokeless tobacco: Never   Vaping Use    Vaping status: Never Used   Substance and Sexual Activity    Alcohol use: No    Drug use: No    Sexual activity: Yes     Partners: Male     Birth control/protection: Surgical         ALLERGIES  Metformin and Latex        REVIEW OF SYSTEMS  Review of Systems     All systems reviewed and negative except for those discussed in HPI.       PHYSICAL EXAM    I have reviewed the triage vital signs and nursing notes.    ED Triage Vitals [08/27/24 2132]   Temp Heart Rate Resp BP SpO2   98.4 °F (36.9 °C) 78 16 145/78 94 %      Temp src Heart Rate Source Patient Position BP Location FiO2 (%)   Oral Monitor Sitting Right arm --       Physical Exam    General:  Awake, alert, obese, no acute distress  HEENT: Atraumatic, normocephalic, EOMI, PERRLA, mucous membranes moist  NECK:  Supple, atraumatic  Cardiovascular:  Regular rate, regular rhythm, no murmurs, rubs, or gallops.  Extremities well perfused   Respiratory:  Regular rate, clear lungs to auscultation bilaterally.  No rhonchi, rales, wheezing  Abdominal:  Soft, nondistended, tenderness of right lower quadrant.  No guarding or rebound.  No palpable masses  Extremity:  No visible bony abnormalities in all 4 extremities.  Full range of motion of all extremities.  Skin:  Warm and dry.  No rashes  Neuro:  AAOx3, GCS 15. Cranial nerves 2-12 grossly intact.  No focal strength or sensation deficits.  Psych:  Mood and affect appropriate.        LAB RESULTS  Recent Results (from the past 24 hour(s))   Comprehensive Metabolic Panel     Collection Time: 08/27/24  9:46 PM    Specimen: Blood   Result Value Ref Range    Glucose 222 (H) 65 - 99 mg/dL    BUN 10 6 - 20 mg/dL    Creatinine 0.84 0.57 - 1.00 mg/dL    Sodium 139 136 - 145 mmol/L    Potassium 4.0 3.5 - 5.2 mmol/L    Chloride 101 98 - 107 mmol/L    CO2 29.5 (H) 22.0 - 29.0 mmol/L    Calcium 9.5 8.6 - 10.5 mg/dL    Total Protein 7.9 6.0 - 8.5 g/dL    Albumin 4.0 3.5 - 5.2 g/dL    ALT (SGPT) 27 1 - 33 U/L    AST (SGOT) 24 1 - 32 U/L    Alkaline Phosphatase 105 39 - 117 U/L    Total Bilirubin 0.4 0.0 - 1.2 mg/dL    Globulin 3.9 gm/dL    A/G Ratio 1.0 g/dL    BUN/Creatinine Ratio 11.9 7.0 - 25.0    Anion Gap 8.5 5.0 - 15.0 mmol/L    eGFR 81.7 >60.0 mL/min/1.73   Lipase    Collection Time: 08/27/24  9:46 PM    Specimen: Blood   Result Value Ref Range    Lipase 24 13 - 60 U/L   Lactic Acid, Plasma    Collection Time: 08/27/24  9:46 PM    Specimen: Blood   Result Value Ref Range    Lactate 2.0 0.5 - 2.0 mmol/L   Green Top (Gel)    Collection Time: 08/27/24  9:46 PM   Result Value Ref Range    Extra Tube Hold for add-ons.    Lavender Top    Collection Time: 08/27/24  9:46 PM   Result Value Ref Range    Extra Tube hold for add-on    Gold Top - SST    Collection Time: 08/27/24  9:46 PM   Result Value Ref Range    Extra Tube Hold for add-ons.    Light Blue Top    Collection Time: 08/27/24  9:46 PM   Result Value Ref Range    Extra Tube Hold for add-ons.    CBC Auto Differential    Collection Time: 08/27/24  9:46 PM    Specimen: Blood   Result Value Ref Range    WBC 9.50 3.40 - 10.80 10*3/mm3    RBC 5.87 (H) 3.77 - 5.28 10*6/mm3    Hemoglobin 17.0 (H) 12.0 - 15.9 g/dL    Hematocrit 50.3 (H) 34.0 - 46.6 %    MCV 85.7 79.0 - 97.0 fL    MCH 29.0 26.6 - 33.0 pg    MCHC 33.8 31.5 - 35.7 g/dL    RDW 12.8 12.3 - 15.4 %    RDW-SD 39.8 37.0 - 54.0 fl    MPV 9.7 6.0 - 12.0 fL    Platelets 230 140 - 450 10*3/mm3    Neutrophil % 54.8 42.7 - 76.0 %    Lymphocyte % 35.7 19.6 - 45.3 %    Monocyte % 7.1 5.0 - 12.0 %     Eosinophil % 1.3 0.3 - 6.2 %    Basophil % 0.7 0.0 - 1.5 %    Immature Grans % 0.4 0.0 - 0.5 %    Neutrophils, Absolute 5.21 1.70 - 7.00 10*3/mm3    Lymphocytes, Absolute 3.39 (H) 0.70 - 3.10 10*3/mm3    Monocytes, Absolute 0.67 0.10 - 0.90 10*3/mm3    Eosinophils, Absolute 0.12 0.00 - 0.40 10*3/mm3    Basophils, Absolute 0.07 0.00 - 0.20 10*3/mm3    Immature Grans, Absolute 0.04 0.00 - 0.05 10*3/mm3    nRBC 0.0 0.0 - 0.2 /100 WBC   Urinalysis With Microscopic If Indicated (No Culture) - Urine, Clean Catch    Collection Time: 08/27/24 11:12 PM    Specimen: Urine, Clean Catch   Result Value Ref Range    Color, UA Yellow Yellow, Straw    Appearance, UA Clear Clear    pH, UA 6.5 5.0 - 8.0    Specific Gravity, UA >=1.030 1.005 - 1.030    Glucose, UA Negative Negative    Ketones, UA Negative Negative    Bilirubin, UA Negative Negative    Blood, UA Negative Negative    Protein, UA Negative Negative    Leuk Esterase, UA Negative Negative    Nitrite, UA Negative Negative    Urobilinogen, UA 1.0 E.U./dL 0.2 - 1.0 E.U./dL             RADIOLOGY  CT Abdomen Pelvis With Contrast    Result Date: 8/27/2024  FINAL REPORT TECHNIQUE: null CLINICAL HISTORY: Right lower quadrant abdominal pain COMPARISON: null FINDINGS: CT ABDOMEN AND PELVIS WITH CONTRAST COMPARISON: None. FINDINGS: Images of the lower lungs demonstrate atelectatic changes bilaterally. Cholecystectomy clips are present. Fatty and enlarged liver is noted, estimated to measure approximately 19.1 cm on coronal image 58. Stomach is mildly distended with enteric contents and some gas. No CT evidence of acute pancreatitis. Trace ascites is noted adjacent to the spleen on sagittal image 27. Spleen and adrenal glands are otherwise unremarkable. Both kidneys are unremarkable. No hydronephrosis or obstructing  stone. Abdominal aorta is normal in caliber, without evidence of an aneurysm or dissection. There is mild atherosclerotic plaque in the abdominal aorta. The urinary  bladder is underdistended. The patient is status post hysterectomy. There is no evidence of a small-bowel obstruction or free air. There is no pericolonic inflammation. The appendix is visualized, and there is no evidence of acute appendicitis. There is no lymphadenopathy or loculated fluid collection. There is no evidence of a bowel containing hernia. The bone windows demonstrate no acute abnormalities.     IMPRESSION: 1. No evidence of a bowel obstruction or free air. No pericolonic inflammation. No evidence of appendicitis. No evidence of a bowel containing hernia. 2. Fatty and enlarged liver. 3. Trace ascites is noted adjacent to the spleen. 4. Atelectatic changes are noted in the lower lungs. 5. Additional findings are detailed above. Authenticated and Electronically Signed by Robert Mishra MD on 08/27/2024 11:29:27 PM         PROCEDURES    Procedures    No orders to display       MEDICATIONS GIVEN IN ER    Medications   sodium chloride 0.9 % flush 10 mL (has no administration in time range)   morphine injection 4 mg (4 mg Intravenous Given 8/27/24 2238)   sodium chloride 0.9 % bolus 1,000 mL (0 mL Intravenous Stopped 8/27/24 2305)   ondansetron (ZOFRAN) injection 4 mg (4 mg Intravenous Given 8/27/24 2237)   iopamidol (ISOVUE-300) 61 % injection 100 mL (100 mL Intravenous Given 8/27/24 2257)         MEDICAL DECISION MAKING, PROGRESS, and CONSULTS    All labs, if obtained, have been independently reviewed by me.  All radiology studies, if obtained, have been reviewed by me and the radiologist dictating the report.  All EKG's, if obtained, have been independently viewed and interpreted by me.      Discussion below represents my analysis of pertinent findings related to patient's condition, differential diagnosis, treatment plan and final disposition.    Karli Ricardo is a 54 y.o. female who presents to the ED c/o abdominal pain.  Hemodynamically stable nontoxic in appearance on arrival.  Differential  includes was not limited to appendicitis, gastroenteritis, constipation, kidney stone, UTI, pyelonephritis, diverticulitis.  Extensive labs ordered along with CT of abdomen pelvis with contrast.  Patient given IV morphine for pain control along with IV Zofran for nausea and 1 L bolus of normal saline.    Labs show mild hemoconcentration with elevations in hemoglobin and hematocrit along with RBC but no significant leukocytosis as of yet.  CMP largely normal other than hyperglycemia consistent with patient's diabetes mellitus.  Normal LFTs and normal lactate.  Normal lipase.  Urinalysis negative.    CT of abdomen pelvis with contrast obtained which was personally visualized and was interpreted showed no evidence of bowel obstruction or free air along with no evidence of appendicitis or acute cause of patient's abdominal pain.    Symptoms improved/well-controlled on repeat assessment.  Nonperitoneal take abdomen.  Discussed results with patient along with need for follow-up with primary care provider.  Patient agreeable with this plan and was discharged.                             Orders placed during this visit:  Orders Placed This Encounter   Procedures    CT Abdomen Pelvis With Contrast    [place] Draw    Comprehensive Metabolic Panel    Lipase    Urinalysis With Microscopic If Indicated (No Culture) - Urine, Clean Catch    Lactic Acid, Plasma    CBC Auto Differential    NPO Diet NPO Type: Strict NPO    Undress & Gown    Insert Peripheral IV    CBC & Differential    Green Top (Gel)    Lavender Top    Gold Top - SST    Light Blue Top         ED Course:    Consultants:                  Shared Decision Making:  After my consideration of clinical presentation and any laboratory/radiology studies obtained, I discussed the findings with the patient/patient representative who is in agreement with the treatment plan and the final disposition.   Risks and benefits of discharge and/or observation/admission were  discussed.      AS OF 00:09 EDT VITALS:    BP - 125/61  HR - 68  TEMP - 98.4 °F (36.9 °C) (Oral)  O2 SATS - 95%                  DIAGNOSIS  Final diagnoses:   Right lower quadrant abdominal pain   Mild dehydration         DISPOSITION  Discharge      Please note that portions of this document were completed with voice recognition software.        Nicolas Petit MD  08/28/24 0010

## 2024-09-24 PROBLEM — R05.1 ACUTE COUGH: Status: ACTIVE | Noted: 2024-09-24

## 2024-09-30 ENCOUNTER — TELEPHONE (OUTPATIENT)
Dept: INTERNAL MEDICINE | Facility: CLINIC | Age: 54
End: 2024-09-30
Payer: MEDICARE

## 2024-09-30 NOTE — TELEPHONE ENCOUNTER
Caller: Karli Ricardo    Relationship: Self    Best call back number:[phone]     What medication are you requesting: ANTIBIOTIC     What are your current symptoms: COUGHING UP PHLEGM , CONGESTION AND HEADACHE     How long have you been experiencing symptoms: A WEEK     Have you had these symptoms before:    [x] Yes  [] No    Have you been treated for these symptoms before:   [x] Yes  [] No    If a prescription is needed, what is your preferred pharmacy and phone number:    [id number] - [place], [place] - [address] - [phone]  - [phone]  [phone]       Additional notes:  PATIENT STATED THAT SHE IS GETTING OVER COVID AND TESTED NEGATIVE FOR COVID YESTERDAY 09/29/2024 BUT IS STILL WITH A LINGERING COUGH AND COUGHING UP PHLEGM, CONGESTION AND HEADACHE AND WOULD LIKE FOR A ANTIBIOTIC TO BE CALLED INTO THE PHARMACY TO HELP

## 2024-09-30 NOTE — TELEPHONE ENCOUNTER
Spoke with patient, advised that abx will not be prescribed without an office visit. Appointment scheduled for tomorrow. Is aware that she will need to wear a mask.

## 2024-10-01 ENCOUNTER — OFFICE VISIT (OUTPATIENT)
Dept: INTERNAL MEDICINE | Facility: CLINIC | Age: 54
End: 2024-10-01
Payer: COMMERCIAL

## 2024-10-01 VITALS
BODY MASS INDEX: 36.14 KG/M2 | WEIGHT: 204 LBS | SYSTOLIC BLOOD PRESSURE: 124 MMHG | TEMPERATURE: 97 F | OXYGEN SATURATION: 94 % | HEART RATE: 83 BPM | DIASTOLIC BLOOD PRESSURE: 82 MMHG | HEIGHT: 63 IN

## 2024-10-01 DIAGNOSIS — J01.10 ACUTE NON-RECURRENT FRONTAL SINUSITIS: Primary | ICD-10-CM

## 2024-10-01 DIAGNOSIS — B37.9 CANDIDIASIS: ICD-10-CM

## 2024-10-01 PROCEDURE — 99214 OFFICE O/P EST MOD 30 MIN: CPT | Performed by: FAMILY MEDICINE

## 2024-10-01 PROCEDURE — 96372 THER/PROPH/DIAG INJ SC/IM: CPT | Performed by: FAMILY MEDICINE

## 2024-10-01 RX ORDER — FLUCONAZOLE 100 MG/1
100 TABLET ORAL DAILY
Qty: 7 TABLET | Refills: 1 | Status: SHIPPED | OUTPATIENT
Start: 2024-10-01

## 2024-10-01 RX ORDER — METHYLPREDNISOLONE 4 MG/1
TABLET ORAL
COMMUNITY

## 2024-10-01 RX ORDER — IPRATROPIUM BROMIDE AND ALBUTEROL SULFATE 2.5; .5 MG/3ML; MG/3ML
3 SOLUTION RESPIRATORY (INHALATION) EVERY 4 HOURS PRN
Qty: 360 ML | Refills: 0 | Status: SHIPPED | OUTPATIENT
Start: 2024-10-01

## 2024-10-01 NOTE — PROGRESS NOTES
Karli Ricardo is a 54 y.o. female.    Chief Complaint   Patient presents with    Cough     Was diagnosed with COVID 7 days ago, was doing better but now not.   If she needs antibiotics she would like to just do an injection.     Headache       HPI   History of Present Illness  The patient presents today complaining of respiratory symptoms after COVID-19.    She sought care at an urgent care facility a week ago where she was diagnosed with COVID-19 and prescribed an antiviral medication. Despite initial improvement, her condition has since worsened. She reports a persistent cough producing green, chunky sputum. She is experiencing severe headaches, which are exacerbated by coughing or sneezing, and are primarily located around her eyes. She also has a sore throat and ear pain. She describes a sensation of her head feeling clogged when she coughs. She has been taking Tylenol, but it does not seem to provide relief. She has no known allergies to antibiotics.    She is having difficulty breathing and wheezing, for which she uses a rescue inhaler twice daily. She is currently using the Breztri inhaler, administering 2 puffs in the morning and 2 in the evening.    Additionally, she is dealing with a yeast infection on her tongue and other areas, for which she requires Diflucan.    The following portions of the patient's history were reviewed and updated as appropriate: allergies, current medications, past family history, past medical history, past social history, past surgical history and problem list.     Allergies   Allergen Reactions    Metformin Other (See Comments)     Abdominal pain    Latex Swelling     Happened once         Current Outpatient Medications:     albuterol sulfate  (90 Base) MCG/ACT inhaler, Inhale 1-2 puffs Every 6 (Six) Hours As Needed for Wheezing or Shortness of Air., Disp: 18 g, Rfl: 5    albuterol sulfate  (90 Base) MCG/ACT inhaler, Inhale 2 puffs Every 4 (Four) Hours As  [Needed for Wheezing., Disp: 18 g, Rfl: 0    Budeson-Glycopyrrol-Formoterol (BREZTRI) 160-9-4.8 MCG/ACT aerosol inhaler, Inhale 2 puffs 2 (Two) Times a Day., Disp: 1 each, Rfl: 0    cholecalciferol (VITAMIN D3) 1.25 MG (89737 UT) capsule, Take 1 capsule by mouth 1 (One) Time Per Week., Disp: , Rfl:     Enbrel SureClick 50 MG/ML solution auto-injector, , Disp: , Rfl:     fluconazole (DIFLUCAN) 150 MG tablet, Take one tablet at onset of symptoms, then repeat 3 days later., Disp: 2 tablet, Rfl: 5    gabapentin (NEURONTIN) 800 MG tablet, Take 1 tablet by mouth 3 (Three) Times a Day., Disp: , Rfl:     HYDROcodone-acetaminophen (NORCO) 5-325 MG per tablet, Take 1 tablet 3 times a day by oral route for 30 days., Disp: , Rfl:     Insulin Glargine, 1 Unit Dial, (Toujeo SoloStar) 300 UNIT/ML solution pen-injector injection, Inject 75 Units under the skin into the appropriate area as directed Every Night., Disp: 24 mL, Rfl: 1    Insulin Lispro (HumaLOG KwikPen) 200 UNIT/ML solution pen-injector, Inject 25-35 Units under the skin into the appropriate area as directed 3 (Three) Times a Day With Meals., Disp: 48 mL, Rfl: 1    Insulin Pen Needle (BD Pen Needle Oneyda U/F) 32G X 4 MM misc, 1 Units 4 (Four) Times a Day., Disp: 100 each, Rfl: 12    leflunomide (ARAVA) 20 MG tablet, leflunomide 20 mg tablet, Disp: , Rfl:     levothyroxine (Synthroid) 50 MCG tablet, Take 1 tablet by mouth Every Morning. For hypothyroidism., Disp: 90 tablet, Rfl: 1    lidocaine (Lidoderm) 5 %, APPLY 1 PATCH BY TOPICAL ROUTE ONCE DAILY (MAY WEAR UP TO 12HOURS.), Disp: , Rfl:     lisinopril (PRINIVIL,ZESTRIL) 5 MG tablet, Take 1 tablet by mouth Every Night. For high blood pressure and kidney protection from diabetes mellitus., Disp: 90 tablet, Rfl: 1    Magnesium Oxide 400 (240 Mg) MG tablet, Take 1 tablet by mouth 2 (Two) Times a Day., Disp: 60 tablet, Rfl: 4    methylPREDNISolone (MEDROL) 4 MG tablet, TAKE 1 ROW OF TABLETS BY MOUTH EACH DAY AS INSTRUCTED  "ON THE PACKAGE, Disp: , Rfl:     Naloxone HCl (Kloxxado) 8 MG/0.1ML liquid, INSTILL 1 SPRAY INTO 1 NOSTRIL. INSTILL 2ND DOSE FROM NEW CONTAINER IN OTHER NOSTRIL IF NO RESPONSE OR RELAPSE OCCURS AS NEEDED. CALL 911, Disp: , Rfl:     nortriptyline (PAMELOR) 50 MG capsule, , Disp: , Rfl:     ondansetron ODT (ZOFRAN-ODT) 4 MG disintegrating tablet, Place 1 tablet under the tongue Every 8 (Eight) Hours As Needed for Nausea or Vomiting., Disp: 9 tablet, Rfl: 0    ONE TOUCH ULTRA TEST test strip, USE TO CHECK BLOOD GLUCOSE ONCE DAILY OR AS DIRECTED, Disp: , Rfl: 12    pantoprazole (PROTONIX) 40 MG EC tablet, Take 1 tablet by mouth Daily As Needed (heartburn/reflux)., Disp: 90 tablet, Rfl: 3    tiZANidine (ZANAFLEX) 4 MG tablet, Take 1 tablet by mouth Every 8 (Eight) Hours As Needed for Muscle Spasms., Disp: 90 tablet, Rfl: 5    traMADol (ULTRAM) 50 MG tablet, Take 2 tablets 3 times a day by oral route for 30 days., Disp: , Rfl:     triamcinolone (KENALOG) 0.1 % cream, APPLY TOPICALLY TO AFFECTED AREA(S) 2 TIMES DAILY AS NEEDED. DO NOT USE FOR MORE THAN 14 DAYS., Disp: , Rfl:     fluconazole (Diflucan) 100 MG tablet, Take 1 tablet by mouth Daily., Disp: 7 tablet, Rfl: 1    ipratropium-albuterol (DUO-NEB) 0.5-2.5 mg/3 ml nebulizer, Take 3 mL by nebulization Every 4 (Four) Hours As Needed for Wheezing., Disp: 360 mL, Rfl: 0  No current facility-administered medications for this visit.    ROS    Review of Systems   Constitutional:  Positive for fatigue. Negative for fever.   HENT:  Positive for congestion, sinus pressure and sore throat.    Respiratory:  Positive for cough, shortness of breath and wheezing.    Neurological:  Positive for headache.       Vitals:    10/01/24 1355   BP: 124/82   BP Location: Right arm   Patient Position: Sitting   Cuff Size: Adult   Pulse: 83   Temp: 97 °F (36.1 °C)   SpO2: 94%   Weight: 92.5 kg (204 lb)   Height: 160 cm (63\")   PainSc:   6         Physical Exam     Physical Exam  Constitutional: "       General: She is not in acute distress.     Appearance: Normal appearance. She is well-developed. She is ill-appearing.   HENT:      Head: Normocephalic and atraumatic.      Right Ear: Tympanic membrane and external ear normal.      Left Ear: Tympanic membrane and external ear normal.      Nose:      Right Sinus: Frontal sinus tenderness present.      Left Sinus: Frontal sinus tenderness present.      Mouth/Throat:      Pharynx: Posterior oropharyngeal erythema (PND) present.   Eyes:      Extraocular Movements: Extraocular movements intact.      Conjunctiva/sclera: Conjunctivae normal.   Cardiovascular:      Rate and Rhythm: Normal rate and regular rhythm.      Heart sounds: No murmur heard.  Pulmonary:      Effort: Pulmonary effort is normal. No respiratory distress.      Breath sounds: Normal breath sounds. No wheezing.   Abdominal:      General: Bowel sounds are normal. There is no distension.      Palpations: Abdomen is soft.      Tenderness: There is no abdominal tenderness.   Skin:     General: Skin is warm and dry.   Neurological:      Mental Status: She is alert and oriented to person, place, and time.      Cranial Nerves: No cranial nerve deficit.   Psychiatric:         Mood and Affect: Mood normal.         Behavior: Behavior normal.             Diagnoses and all orders for this visit:    1. Acute non-recurrent frontal sinusitis (Primary)  -     Penicillin G Benzathine (BICILLIN-LA) injection 1.2 Million Units    2. Candidiasis    Other orders  -     fluconazole (Diflucan) 100 MG tablet; Take 1 tablet by mouth Daily.  Dispense: 7 tablet; Refill: 1  -     ipratropium-albuterol (DUO-NEB) 0.5-2.5 mg/3 ml nebulizer; Take 3 mL by nebulization Every 4 (Four) Hours As Needed for Wheezing.  Dispense: 360 mL; Refill: 0        Assessment & Plan  1. Sinusitis.  A bacterial infection has developed on top of previous COVID-19. Rebound symptoms after taking the antiviral were discussed.  A penicillin injection has  been administered today. She will continue using her routine inhalers, Breztri (2 puffs in the morning and 2 puffs in the evening). A nebulizer has been providedr.    2. Candidiasis.  She reports recurrent yeast infections, including oral candidiasis. Diflucan has been prescribed for one week, with a refill provided.      New Medications Ordered This Visit   Medications    Penicillin G Benzathine (BICILLIN-LA) injection 1.2 Million Units    fluconazole (Diflucan) 100 MG tablet     Sig: Take 1 tablet by mouth Daily.     Dispense:  7 tablet     Refill:  1    ipratropium-albuterol (DUO-NEB) 0.5-2.5 mg/3 ml nebulizer     Sig: Take 3 mL by nebulization Every 4 (Four) Hours As Needed for Wheezing.     Dispense:  360 mL     Refill:  0       No orders of the defined types were placed in this encounter.      Return if symptoms worsen or fail to improve.    Cherise Reeseer, DO

## 2024-10-30 ENCOUNTER — OFFICE VISIT (OUTPATIENT)
Dept: INTERNAL MEDICINE | Facility: CLINIC | Age: 54
End: 2024-10-30
Payer: MEDICARE

## 2024-10-30 VITALS
TEMPERATURE: 96.7 F | DIASTOLIC BLOOD PRESSURE: 76 MMHG | WEIGHT: 203.8 LBS | SYSTOLIC BLOOD PRESSURE: 124 MMHG | HEART RATE: 80 BPM | OXYGEN SATURATION: 94 % | HEIGHT: 63 IN | BODY MASS INDEX: 36.11 KG/M2

## 2024-10-30 DIAGNOSIS — K44.9 HIATAL HERNIA: ICD-10-CM

## 2024-10-30 DIAGNOSIS — R79.9 ABNORMAL BLOOD CHEMISTRY: ICD-10-CM

## 2024-10-30 DIAGNOSIS — K21.9 GASTROESOPHAGEAL REFLUX DISEASE WITHOUT ESOPHAGITIS: ICD-10-CM

## 2024-10-30 DIAGNOSIS — I15.2 HYPERTENSION ASSOCIATED WITH DIABETES: ICD-10-CM

## 2024-10-30 DIAGNOSIS — R06.00 DYSPNEA, UNSPECIFIED TYPE: ICD-10-CM

## 2024-10-30 DIAGNOSIS — E66.01 CLASS 2 SEVERE OBESITY DUE TO EXCESS CALORIES WITH SERIOUS COMORBIDITY AND BODY MASS INDEX (BMI) OF 36.0 TO 36.9 IN ADULT: ICD-10-CM

## 2024-10-30 DIAGNOSIS — Z51.81 MEDICATION MONITORING ENCOUNTER: ICD-10-CM

## 2024-10-30 DIAGNOSIS — E66.812 CLASS 2 SEVERE OBESITY DUE TO EXCESS CALORIES WITH SERIOUS COMORBIDITY AND BODY MASS INDEX (BMI) OF 36.0 TO 36.9 IN ADULT: ICD-10-CM

## 2024-10-30 DIAGNOSIS — Z91.199 NONCOMPLIANCE: ICD-10-CM

## 2024-10-30 DIAGNOSIS — R11.0 NAUSEA: ICD-10-CM

## 2024-10-30 DIAGNOSIS — E55.9 VITAMIN D DEFICIENCY: ICD-10-CM

## 2024-10-30 DIAGNOSIS — E53.8 LOW FOLATE: ICD-10-CM

## 2024-10-30 DIAGNOSIS — Z79.4 TYPE 2 DIABETES MELLITUS WITH HYPERGLYCEMIA, WITH LONG-TERM CURRENT USE OF INSULIN: Primary | ICD-10-CM

## 2024-10-30 DIAGNOSIS — M06.9 RHEUMATOID ARTHRITIS INVOLVING MULTIPLE SITES, UNSPECIFIED WHETHER RHEUMATOID FACTOR PRESENT: ICD-10-CM

## 2024-10-30 DIAGNOSIS — E03.9 ACQUIRED HYPOTHYROIDISM: ICD-10-CM

## 2024-10-30 DIAGNOSIS — E11.59 HYPERTENSION ASSOCIATED WITH DIABETES: ICD-10-CM

## 2024-10-30 DIAGNOSIS — E11.65 TYPE 2 DIABETES MELLITUS WITH HYPERGLYCEMIA, WITH LONG-TERM CURRENT USE OF INSULIN: Primary | ICD-10-CM

## 2024-10-30 PROBLEM — R05.1 ACUTE COUGH: Status: RESOLVED | Noted: 2024-09-24 | Resolved: 2024-10-30

## 2024-10-30 PROBLEM — R21 RASH: Status: RESOLVED | Noted: 2021-06-07 | Resolved: 2024-10-30

## 2024-10-30 PROCEDURE — 1160F RVW MEDS BY RX/DR IN RCRD: CPT | Performed by: FAMILY MEDICINE

## 2024-10-30 PROCEDURE — 1159F MED LIST DOCD IN RCRD: CPT | Performed by: FAMILY MEDICINE

## 2024-10-30 PROCEDURE — 3074F SYST BP LT 130 MM HG: CPT | Performed by: FAMILY MEDICINE

## 2024-10-30 PROCEDURE — G2211 COMPLEX E/M VISIT ADD ON: HCPCS | Performed by: FAMILY MEDICINE

## 2024-10-30 PROCEDURE — 3078F DIAST BP <80 MM HG: CPT | Performed by: FAMILY MEDICINE

## 2024-10-30 PROCEDURE — 1125F AMNT PAIN NOTED PAIN PRSNT: CPT | Performed by: FAMILY MEDICINE

## 2024-10-30 PROCEDURE — 99215 OFFICE O/P EST HI 40 MIN: CPT | Performed by: FAMILY MEDICINE

## 2024-10-30 RX ORDER — PEN NEEDLE, DIABETIC 32GX 5/32"
1 NEEDLE, DISPOSABLE MISCELLANEOUS 4 TIMES DAILY
Qty: 100 EACH | Refills: 12 | Status: SHIPPED | OUTPATIENT
Start: 2024-10-30

## 2024-10-30 RX ORDER — LEVOTHYROXINE SODIUM 50 UG/1
50 TABLET ORAL
Qty: 90 TABLET | Refills: 1 | Status: SHIPPED | OUTPATIENT
Start: 2024-10-30

## 2024-10-30 RX ORDER — ONDANSETRON 4 MG/1
4 TABLET, ORALLY DISINTEGRATING ORAL EVERY 8 HOURS PRN
Qty: 10 TABLET | Refills: 2 | Status: SHIPPED | OUTPATIENT
Start: 2024-10-30

## 2024-10-30 RX ORDER — ALBUTEROL SULFATE 90 UG/1
1-2 INHALANT RESPIRATORY (INHALATION) EVERY 6 HOURS PRN
Qty: 18 G | Refills: 5 | Status: SHIPPED | OUTPATIENT
Start: 2024-10-30

## 2024-10-30 RX ORDER — LISINOPRIL 5 MG/1
5 TABLET ORAL NIGHTLY
Qty: 90 TABLET | Refills: 1 | Status: SHIPPED | OUTPATIENT
Start: 2024-10-30

## 2024-10-30 RX ORDER — PANTOPRAZOLE SODIUM 40 MG/1
40 TABLET, DELAYED RELEASE ORAL DAILY PRN
Qty: 90 TABLET | Refills: 3 | Status: SHIPPED | OUTPATIENT
Start: 2024-10-30

## 2024-10-30 RX ORDER — INSULIN GLARGINE 300 U/ML
75 INJECTION, SOLUTION SUBCUTANEOUS NIGHTLY
Qty: 24 ML | Refills: 1 | Status: SHIPPED | OUTPATIENT
Start: 2024-10-30

## 2024-10-30 RX ORDER — INSULIN LISPRO 200 [IU]/ML
25-35 INJECTION, SOLUTION SUBCUTANEOUS
Qty: 48 ML | Refills: 1 | Status: SHIPPED | OUTPATIENT
Start: 2024-10-30

## 2024-10-30 NOTE — ASSESSMENT & PLAN NOTE
Patient does not follow up on a regular basis. Ideal to have follow up on regular basis to work as team to improve health. Unfortunately, if noncompliance continues patient could be dismissed from practice.

## 2024-10-30 NOTE — PROGRESS NOTES
"Chief Complaint  Diabetes (Med refills)    Subjective        [place] Shameka Ricardo presents to [facility]  History of Present Illness  Follow up for med refills  Our last visit was on 10/4/23, visit prior to that 5/25/22  Not monitoring glucose levels closely  Cannot specify dose she's taking on basal insulin  Reports trying CGM previously and did not like    Needs forms for FMLA completed for  who helps with her care.      Objective   Vital Signs:  /76   Pulse 80   Temp 96.7 °F (35.9 °C)   Ht 160 cm (63\")   Wt 92.4 kg (203 lb 12.8 oz)   SpO2 94%   BMI 36.10 kg/m²   Estimated body mass index is 36.1 kg/m² as calculated from the following:    Height as of this encounter: 160 cm (63\").    Weight as of this encounter: 92.4 kg (203 lb 12.8 oz).    Class 2 Severe Obesity (BMI >=35 and <=39.9). Obesity-related health conditions include the following: hypertension, diabetes mellitus, dyslipidemias, GERD, osteoarthritis, and hepatic steatosis. Obesity is unchanged. BMI is is above average; BMI management plan is completed. We discussed Information on healthy weight added to patient's after visit summary.      Physical Exam  Vitals and nursing note reviewed.   Constitutional:       General: She is not in acute distress.     Appearance: Normal appearance. She is well-developed and well-groomed. She is obese. She is not ill-appearing, toxic-appearing or diaphoretic.   HENT:      Head: Normocephalic and atraumatic.      Right Ear: Hearing normal.      Left Ear: Hearing normal.   Eyes:      General: Lids are normal. No scleral icterus.     Extraocular Movements: Extraocular movements intact.   Neck:      Trachea: Phonation normal.   Cardiovascular:      Rate and Rhythm: Normal rate and regular rhythm.   Pulmonary:      Effort: Pulmonary effort is normal.      Breath sounds: Normal breath sounds.   Musculoskeletal:      Cervical back: Neck supple.   Skin:     Coloration: Skin is " not jaundiced or pale.   Neurological:      General: No focal deficit present.      Mental Status: She is alert and oriented to person, place, and time.      Motor: Motor function is intact.   Psychiatric:         Attention and Perception: Attention and perception normal.         Mood and Affect: Mood and affect normal.         Speech: Speech normal.         Behavior: Behavior normal. Behavior is cooperative.         Thought Content: Thought content normal.         Cognition and Memory: Cognition and memory normal.         Judgment: Judgment normal.        Result Review :  The following data was reviewed by: Lovely Loyd MD on 10/30/2024:  PROGRESS NOTES - SCAN - PROGRESS NOTE.[facility] PAIN.[id number] (10/18/2024)     10/23/24  rheumatology note. Rheumatoid arthritis, PsA vs axial spondyloarthropathy     Lab Results   Component Value Date    HGBA1C 9.1 (H) 10/04/2023    HGBA1C 13 05/25/2022    HGBA1C 13.1 01/06/2022      Lab Results   Component Value Date    TSH 4.770 (H) 10/04/2023     Lab Results   Component Value Date    FREET4 0.98 10/04/2023             Assessment and Plan   Diagnoses and all orders for this visit:    1. Type 2 diabetes mellitus with hyperglycemia, with long-term current use of insulin (Primary)  Assessment & Plan:  Diabetes is  unchanged. Patient presents today for medicine refills and form completion.  Last office visit for diabetes >1 year ago, prior to that was >1 year. Noncompliant with regular follow up which has a negative impact on her overall health .   Refilling all medicines with doses listed. Patient needs to have labs drawn prior to next appointment and we'll discuss results at the appointment. Discussed with patient.   Diabetes will be reassessed  on 1/21/25 at appointment with labs to be drawn approx a week or less prior, orders in.    Orders:  -     Insulin Glargine, 1 Unit Dial, (Toujeo SoloStar) 300 UNIT/ML solution pen-injector injection; Inject 75 Units under the  skin into the appropriate area as directed Every Night.  Dispense: 24 mL; Refill: 1  -     Insulin Lispro (HumaLOG KwikPen) 200 UNIT/ML solution pen-injector; Inject 25-35 Units under the skin into the appropriate area as directed 3 (Three) Times a Day With Meals.  Dispense: 48 mL; Refill: 1  -     lisinopril (PRINIVIL,ZESTRIL) 5 MG tablet; Take 1 tablet by mouth Every Night. For high blood pressure and kidney protection from diabetes mellitus.  Dispense: 90 tablet; Refill: 1  -     Insulin Pen Needle (BD Pen Needle Oneyda U/F) 32G X 4 MM misc; Use 1 Units 4 (Four) Times a Day.  Dispense: 100 each; Refill: 12  -     Hemoglobin A1c; Future  -     Comprehensive Metabolic Panel; Future  -     Vitamin B12; Future  -     Lipid Panel; Future    2. Hypertension associated with diabetes  -     lisinopril (PRINIVIL,ZESTRIL) 5 MG tablet; Take 1 tablet by mouth Every Night. For high blood pressure and kidney protection from diabetes mellitus.  Dispense: 90 tablet; Refill: 1  -     CBC (No Diff); Future  -     Comprehensive Metabolic Panel; Future  -     TSH Rfx On Abnormal To Free T4; Future    3. Rheumatoid arthritis involving multiple sites, unspecified whether rheumatoid factor present  Assessment & Plan:  Follow up with [facility].      4. Acquired hypothyroidism  -     levothyroxine (Synthroid) 50 MCG tablet; Take 1 tablet by mouth Every Morning. For hypothyroidism.  Dispense: 90 tablet; Refill: 1  -     CBC (No Diff); Future  -     TSH Rfx On Abnormal To Free T4; Future    5. Gastroesophageal reflux disease without esophagitis  -     pantoprazole (PROTONIX) 40 MG EC tablet; Take 1 tablet by mouth Daily As Needed (heartburn/reflux).  Dispense: 90 tablet; Refill: 3    6. Hiatal hernia  -     pantoprazole (PROTONIX) 40 MG EC tablet; Take 1 tablet by mouth Daily As Needed (heartburn/reflux).  Dispense: 90 tablet; Refill: 3    7. Dyspnea, unspecified type  -     albuterol sulfate  (90 Base) MCG/ACT inhaler; Inhale  1-2 puffs Every 6 (Six) Hours As Needed for Wheezing or Shortness of Air.  Dispense: 18 g; Refill: 5    8. Nausea  -     ondansetron ODT (ZOFRAN-ODT) 4 MG disintegrating tablet; Place 1 tablet under the tongue Every 8 (Eight) Hours As Needed for Nausea or Vomiting.  Dispense: 10 tablet; Refill: 2    9. Vitamin D deficiency  -     Vitamin D,25-Hydroxy; Future    10. Low folate  -     CBC (No Diff); Future  -     Folate; Future    11. Abnormal blood chemistry  -     Hemoglobin A1c; Future  -     CBC (No Diff); Future  -     Comprehensive Metabolic Panel; Future  -     Vitamin B12; Future  -     Folate; Future  -     Lipid Panel; Future  -     TSH Rfx On Abnormal To Free T4; Future  -     Vitamin D,25-Hydroxy; Future    12. Medication monitoring encounter  -     Hemoglobin A1c; Future  -     CBC (No Diff); Future  -     Comprehensive Metabolic Panel; Future  -     Vitamin B12; Future  -     Folate; Future  -     Lipid Panel; Future  -     TSH Rfx On Abnormal To Free T4; Future  -     Vitamin D,25-Hydroxy; Future    13. Class 2 severe obesity due to excess calories with serious comorbidity and body mass index (BMI) of 36.0 to 36.9 in adult  -     Vitamin D,25-Hydroxy; Future    14. Noncompliance  Assessment & Plan:  Patient does not follow up on a regular basis. Ideal to have follow up on regular basis to work as team to improve health. Unfortunately, if noncompliance continues patient could be dismissed from practice.             I spent 41 minutes caring for Georgia on this date of service. This time includes time spent by me in the following activities:preparing for the visit, reviewing tests, performing a medically appropriate examination and/or evaluation , counseling and educating the patient/family/caregiver, ordering medications, tests, or procedures, and documenting information in the medical record    Follow Up   Return in about 2 months (around 1/13/2025) for medicare wellness with labs fasting few days  prior.  Patient was given instructions and counseling regarding her condition or for health maintenance advice. Please see specific information pulled into the AVS if appropriate.

## 2024-10-30 NOTE — ASSESSMENT & PLAN NOTE
Diabetes is  unchanged. Patient presents today for medicine refills and form completion.  Last office visit for diabetes >1 year ago, prior to that was >1 year. Noncompliant with regular follow up which has a negative impact on her overall health .   Refilling all medicines with doses listed. Patient needs to have labs drawn prior to next appointment and we'll discuss results at the appointment. Discussed with patient.   Diabetes will be reassessed  on 1/21/25 at appointment with labs to be drawn approx a week or less prior, orders in.

## 2024-11-15 ENCOUNTER — TRANSCRIBE ORDERS (OUTPATIENT)
Dept: INTERNAL MEDICINE | Facility: CLINIC | Age: 54
End: 2024-11-15
Payer: COMMERCIAL

## 2024-11-15 DIAGNOSIS — Z12.31 VISIT FOR SCREENING MAMMOGRAM: Primary | ICD-10-CM

## 2025-01-30 ENCOUNTER — HOSPITAL ENCOUNTER (OUTPATIENT)
Dept: MAMMOGRAPHY | Facility: HOSPITAL | Age: 55
Discharge: HOME | End: 2025-01-30
Admitting: FAMILY MEDICINE
Payer: COMMERCIAL

## 2025-01-30 DIAGNOSIS — Z12.31 VISIT FOR SCREENING MAMMOGRAM: ICD-10-CM

## 2025-01-30 PROCEDURE — 77067 SCR MAMMO BI INCL CAD: CPT

## 2025-01-30 PROCEDURE — 77063 BREAST TOMOSYNTHESIS BI: CPT

## 2025-04-12 RX ORDER — FLUCONAZOLE 100 MG/1
100 TABLET ORAL DAILY
Qty: 7 TABLET | Refills: 0 | OUTPATIENT
Start: 2025-04-12

## 2025-05-08 ENCOUNTER — TELEPHONE (OUTPATIENT)
Dept: SURGERY | Facility: CLINIC | Age: 55
End: 2025-05-08
Payer: COMMERCIAL

## 2025-06-16 NOTE — PROGRESS NOTES
Patient: Karli Ricardo    YOB: 1970    Date: 06/20/2025    Primary Care Provider: Lovely Loyd MD    Chief Complaint   Patient presents with    Difficulty Swallowing       SUBJECTIVE:    History of present illness:  The patient is in the office today for evaluation for colonoscopy and EGD.  Patient complains of reflux and dysphagia.  She is on 3 year recall for tubular adenoma and sessile serrated adenoma.  Patient concerned about difficulty swallowing, pressure in her chest and abdominal pain.  Especially with dry bread.    The following portions of the patient's history were reviewed and updated as appropriate: allergies, current medications, past family history, past medical history, past social history, past surgical history and problem list.      Review of Systems   Constitutional:  Negative for chills, fever and unexpected weight change.   HENT:  Positive for trouble swallowing. Negative for hearing loss and voice change.    Eyes:  Negative for visual disturbance.   Respiratory:  Negative for apnea, cough, chest tightness, shortness of breath and wheezing.    Cardiovascular:  Negative for chest pain, palpitations and leg swelling.   Gastrointestinal:  Negative for abdominal distention, abdominal pain, anal bleeding, blood in stool, constipation, diarrhea, nausea, rectal pain and vomiting.   Endocrine: Negative for cold intolerance and heat intolerance.   Genitourinary:  Negative for difficulty urinating, dysuria and flank pain.   Musculoskeletal:  Negative for back pain and gait problem.   Skin:  Negative for color change, rash and wound.   Neurological:  Negative for dizziness, syncope, speech difficulty, weakness, light-headedness, numbness and headaches.   Hematological:  Negative for adenopathy. Does not bruise/bleed easily.   Psychiatric/Behavioral:  Negative for confusion. The patient is not nervous/anxious.          Allergies:  Allergies   Allergen Reactions    Metformin  Other (See Comments)     Abdominal pain    Latex Swelling     Happened once       Medications:    Current Outpatient Medications:     albuterol sulfate  (90 Base) MCG/ACT inhaler, Inhale 1-2 puffs Every 6 (Six) Hours As Needed for Wheezing or Shortness of Air., Disp: 18 g, Rfl: 5    Diclofenac Sodium (VOLTAREN) 1 % gel gel, APPLY 2 GRAMS TOPICALLY TO THE AFFECTED AREA(S) FOUR TIMES A DAY AS DIRECTED, Disp: , Rfl:     Enbrel SureClick 50 MG/ML solution auto-injector, , Disp: , Rfl:     fluconazole (Diflucan) 100 MG tablet, Take 1 tablet by mouth Daily., Disp: 7 tablet, Rfl: 1    fluconazole (DIFLUCAN) 150 MG tablet, Take one tablet at onset of symptoms, then repeat 3 days later., Disp: 2 tablet, Rfl: 5    gabapentin (NEURONTIN) 800 MG tablet, Take 1 tablet by mouth 3 (Three) Times a Day., Disp: , Rfl:     Insulin Glargine, 1 Unit Dial, (Toujeo SoloStar) 300 UNIT/ML solution pen-injector injection, Inject 75 Units under the skin into the appropriate area as directed Every Night., Disp: 24 mL, Rfl: 1    Insulin Lispro (HumaLOG KwikPen) 200 UNIT/ML solution pen-injector, Inject 25-35 Units under the skin into the appropriate area as directed 3 (Three) Times a Day With Meals., Disp: 48 mL, Rfl: 1    Insulin Pen Needle (BD Pen Needle Oneyda U/F) 32G X 4 MM misc, Use 1 Units 4 (Four) Times a Day., Disp: 100 each, Rfl: 12    ipratropium-albuterol (DUO-NEB) 0.5-2.5 mg/3 ml nebulizer, Take 3 mL by nebulization Every 4 (Four) Hours As Needed for Wheezing., Disp: 360 mL, Rfl: 0    levothyroxine (Synthroid) 50 MCG tablet, Take 1 tablet by mouth Every Morning. For hypothyroidism., Disp: 90 tablet, Rfl: 1    lisinopril (PRINIVIL,ZESTRIL) 5 MG tablet, Take 1 tablet by mouth Every Night. For high blood pressure and kidney protection from diabetes mellitus., Disp: 90 tablet, Rfl: 1    Molnupiravir (Lagevrio) 200 MG capsule, TAKE 4 CAPSULES BY MOUTH EVERY 12 HOURS FOR 5 DAYS, Disp: , Rfl:     ondansetron ODT (ZOFRAN-ODT) 4 MG  disintegrating tablet, Place 1 tablet under the tongue Every 8 (Eight) Hours As Needed for Nausea or Vomiting., Disp: 10 tablet, Rfl: 2    ONE TOUCH ULTRA TEST test strip, USE TO CHECK BLOOD GLUCOSE ONCE DAILY OR AS DIRECTED, Disp: , Rfl: 12    pantoprazole (PROTONIX) 40 MG EC tablet, Take 1 tablet by mouth Daily As Needed (heartburn/reflux)., Disp: 90 tablet, Rfl: 3    Rezdiffra 80 MG tablet, Take 1 tablet by mouth Daily., Disp: , Rfl:     traMADol (ULTRAM) 50 MG tablet, Take 2 tablets 3 times a day by oral route for 30 days., Disp: , Rfl:     cholecalciferol (VITAMIN D3) 1.25 MG (20418 UT) capsule, Take 1 capsule by mouth 1 (One) Time Per Week. (Patient not taking: Reported on 6/20/2025), Disp: , Rfl:     HYDROcodone-acetaminophen (NORCO) 5-325 MG per tablet, Take 1 tablet 3 times a day by oral route for 30 days. (Patient not taking: Reported on 6/20/2025), Disp: , Rfl:     leflunomide (ARAVA) 20 MG tablet, leflunomide 20 mg tablet (Patient not taking: Reported on 6/20/2025), Disp: , Rfl:     lidocaine (Lidoderm) 5 %, APPLY 1 PATCH BY TOPICAL ROUTE ONCE DAILY (MAY WEAR UP TO 12HOURS.) (Patient not taking: Reported on 6/20/2025), Disp: , Rfl:     Magnesium Oxide 400 (240 Mg) MG tablet, Take 1 tablet by mouth 2 (Two) Times a Day. (Patient not taking: Reported on 10/30/2024), Disp: 60 tablet, Rfl: 4    methylPREDNISolone (MEDROL) 4 MG tablet, TAKE 1 ROW OF TABLETS BY MOUTH EACH DAY AS INSTRUCTED ON THE PACKAGE (Patient not taking: Reported on 6/20/2025), Disp: , Rfl:     Naloxone HCl (Kloxxado) 8 MG/0.1ML liquid, INSTILL 1 SPRAY INTO 1 NOSTRIL. INSTILL 2ND DOSE FROM NEW CONTAINER IN OTHER NOSTRIL IF NO RESPONSE OR RELAPSE OCCURS AS NEEDED. CALL 911 (Patient not taking: Reported on 6/20/2025), Disp: , Rfl:     nortriptyline (PAMELOR) 50 MG capsule, , Disp: , Rfl:     tiZANidine (ZANAFLEX) 4 MG tablet, Take 1 tablet by mouth Every 8 (Eight) Hours As Needed for Muscle Spasms. (Patient not taking: Reported on 6/20/2025),  Disp: 90 tablet, Rfl: 5    triamcinolone (KENALOG) 0.1 % cream, APPLY TOPICALLY TO AFFECTED AREA(S) 2 TIMES DAILY AS NEEDED. DO NOT USE FOR MORE THAN 14 DAYS. (Patient not taking: Reported on 2025), Disp: , Rfl:     History:  Past Medical History:   Diagnosis Date    Acid reflux     Arthritis     Asthma     Chronic bronchitis     Colon polyp 2015    Diabetes     Diverticulitis     Diverticulitis of colon     I think    Diverticulosis 2017    Fracture     lateral left ankle    Gallstones     Gout     Headache 2016    Hernia     When i was 11 years old!    Hypertension     Ileitis 2019    Kidney stone 2022    Passed    Low back pain 2008    Neuromuscular disorder 2006    Pancreatitis O    Thyroid disease     Type 2 diabetes mellitus     Visual impairment        Past Surgical History:   Procedure Laterality Date     SECTION      CHOLECYSTECTOMY      COLONOSCOPY  ,    DR LYLES,DR MARISCAL    COLONOSCOPY N/A 2022    Procedure: COLONOSCOPY with hot biopsy polypectomy x 5 and hot snare polypectomy x2;  Surgeon: Jesus Mariscal MD;  Location: [facility] ENDOSCOPY;  Service: Gastroenterology;  Laterality: N/A;    DILATATION AND CURETTAGE      ENDOSCOPY      DR MARISCAL BEREA    FOOT SURGERY      HYSTERECTOMY  2006    Total    UPPER GASTROINTESTINAL ENDOSCOPY  2015       Family History   Problem Relation Age of Onset    Arthritis Mother     Mental illness Mother     Migraines Mother     Osteoporosis Mother     Thyroid disease Mother     Depression Mother     Heart disease Mother     Colon polyps Mother     Colon cancer Father         passed away at age 53    Cancer Father     Colon polyps Father     Thyroid disease Sister     Breast cancer Cousin     Esophageal cancer Maternal Grandfather     Cancer Maternal Grandfather     Vision loss Paternal Grandmother     Asthma Maternal Aunt     Diabetes Maternal Uncle     Ovarian cancer Neg Hx        Social History     Tobacco Use  "   Smoking status: Former     Current packs/day: 1.00     Average packs/day: 1 pack/day for 20.0 years (20.0 ttl pk-yrs)     Types: Cigarettes    Smokeless tobacco: Never   Vaping Use    Vaping status: Never Used   Substance Use Topics    Alcohol use: No    Drug use: No        OBJECTIVE:    Vital Signs:   Vitals:    06/20/25 1440   BP: 128/84   Pulse: 81   Temp: 97.7 °F (36.5 °C)   TempSrc: Infrared   SpO2: 98%   Weight: 90.7 kg (200 lb)   Height: 160 cm (62.99\")       Physical Exam:       Lungs-clear  Heart-regular rate  Abdomen-slightly tender epigastric region.  No rebound or guarding    Results Review:   I reviewed the patient's new clinical results.    Review of Systems was reviewed and confirmed as accurate as documented by the MA.    ASSESSMENT/PLAN:    1. Oral phase dysphagia    2. Gastroesophageal reflux disease with esophagitis without hemorrhage    3. History of colon polyps        Patient is scheduled for a EGD and colonoscopy.  Possible dilatation if stenosis identified.  Risk of bleeding and perforation discussed and the patient agreeable.  She had no further question was to proceed with the endoscopies.  Patient also told avoid caffeine alcohol and nicotine, continue prescription Protonix 40 mg daily.  Refill will be given as needed.  Also recommend that patient avoid hard meats and dry bread.          Electronically signed by Jesus Melendez MD  06/20/25          "

## 2025-06-16 NOTE — H&P (VIEW-ONLY)
Patient: Karli Ricardo    YOB: 1970    Date: 06/20/2025    Primary Care Provider: Lovely Loyd MD    Chief Complaint   Patient presents with    Difficulty Swallowing       SUBJECTIVE:    History of present illness:  The patient is in the office today for evaluation for colonoscopy and EGD.  Patient complains of reflux and dysphagia.  She is on 3 year recall for tubular adenoma and sessile serrated adenoma.  Patient concerned about difficulty swallowing, pressure in her chest and abdominal pain.  Especially with dry bread.    The following portions of the patient's history were reviewed and updated as appropriate: allergies, current medications, past family history, past medical history, past social history, past surgical history and problem list.      Review of Systems   Constitutional:  Negative for chills, fever and unexpected weight change.   HENT:  Positive for trouble swallowing. Negative for hearing loss and voice change.    Eyes:  Negative for visual disturbance.   Respiratory:  Negative for apnea, cough, chest tightness, shortness of breath and wheezing.    Cardiovascular:  Negative for chest pain, palpitations and leg swelling.   Gastrointestinal:  Negative for abdominal distention, abdominal pain, anal bleeding, blood in stool, constipation, diarrhea, nausea, rectal pain and vomiting.   Endocrine: Negative for cold intolerance and heat intolerance.   Genitourinary:  Negative for difficulty urinating, dysuria and flank pain.   Musculoskeletal:  Negative for back pain and gait problem.   Skin:  Negative for color change, rash and wound.   Neurological:  Negative for dizziness, syncope, speech difficulty, weakness, light-headedness, numbness and headaches.   Hematological:  Negative for adenopathy. Does not bruise/bleed easily.   Psychiatric/Behavioral:  Negative for confusion. The patient is not nervous/anxious.          Allergies:  Allergies   Allergen Reactions    Metformin  Other (See Comments)     Abdominal pain    Latex Swelling     Happened once       Medications:    Current Outpatient Medications:     albuterol sulfate  (90 Base) MCG/ACT inhaler, Inhale 1-2 puffs Every 6 (Six) Hours As Needed for Wheezing or Shortness of Air., Disp: 18 g, Rfl: 5    Diclofenac Sodium (VOLTAREN) 1 % gel gel, APPLY 2 GRAMS TOPICALLY TO THE AFFECTED AREA(S) FOUR TIMES A DAY AS DIRECTED, Disp: , Rfl:     Enbrel SureClick 50 MG/ML solution auto-injector, , Disp: , Rfl:     fluconazole (Diflucan) 100 MG tablet, Take 1 tablet by mouth Daily., Disp: 7 tablet, Rfl: 1    fluconazole (DIFLUCAN) 150 MG tablet, Take one tablet at onset of symptoms, then repeat 3 days later., Disp: 2 tablet, Rfl: 5    gabapentin (NEURONTIN) 800 MG tablet, Take 1 tablet by mouth 3 (Three) Times a Day., Disp: , Rfl:     Insulin Glargine, 1 Unit Dial, (Toujeo SoloStar) 300 UNIT/ML solution pen-injector injection, Inject 75 Units under the skin into the appropriate area as directed Every Night., Disp: 24 mL, Rfl: 1    Insulin Lispro (HumaLOG KwikPen) 200 UNIT/ML solution pen-injector, Inject 25-35 Units under the skin into the appropriate area as directed 3 (Three) Times a Day With Meals., Disp: 48 mL, Rfl: 1    Insulin Pen Needle (BD Pen Needle Oneyda U/F) 32G X 4 MM misc, Use 1 Units 4 (Four) Times a Day., Disp: 100 each, Rfl: 12    ipratropium-albuterol (DUO-NEB) 0.5-2.5 mg/3 ml nebulizer, Take 3 mL by nebulization Every 4 (Four) Hours As Needed for Wheezing., Disp: 360 mL, Rfl: 0    levothyroxine (Synthroid) 50 MCG tablet, Take 1 tablet by mouth Every Morning. For hypothyroidism., Disp: 90 tablet, Rfl: 1    lisinopril (PRINIVIL,ZESTRIL) 5 MG tablet, Take 1 tablet by mouth Every Night. For high blood pressure and kidney protection from diabetes mellitus., Disp: 90 tablet, Rfl: 1    Molnupiravir (Lagevrio) 200 MG capsule, TAKE 4 CAPSULES BY MOUTH EVERY 12 HOURS FOR 5 DAYS, Disp: , Rfl:     ondansetron ODT (ZOFRAN-ODT) 4 MG  disintegrating tablet, Place 1 tablet under the tongue Every 8 (Eight) Hours As Needed for Nausea or Vomiting., Disp: 10 tablet, Rfl: 2    ONE TOUCH ULTRA TEST test strip, USE TO CHECK BLOOD GLUCOSE ONCE DAILY OR AS DIRECTED, Disp: , Rfl: 12    pantoprazole (PROTONIX) 40 MG EC tablet, Take 1 tablet by mouth Daily As Needed (heartburn/reflux)., Disp: 90 tablet, Rfl: 3    Rezdiffra 80 MG tablet, Take 1 tablet by mouth Daily., Disp: , Rfl:     traMADol (ULTRAM) 50 MG tablet, Take 2 tablets 3 times a day by oral route for 30 days., Disp: , Rfl:     cholecalciferol (VITAMIN D3) 1.25 MG (42356 UT) capsule, Take 1 capsule by mouth 1 (One) Time Per Week. (Patient not taking: Reported on 6/20/2025), Disp: , Rfl:     HYDROcodone-acetaminophen (NORCO) 5-325 MG per tablet, Take 1 tablet 3 times a day by oral route for 30 days. (Patient not taking: Reported on 6/20/2025), Disp: , Rfl:     leflunomide (ARAVA) 20 MG tablet, leflunomide 20 mg tablet (Patient not taking: Reported on 6/20/2025), Disp: , Rfl:     lidocaine (Lidoderm) 5 %, APPLY 1 PATCH BY TOPICAL ROUTE ONCE DAILY (MAY WEAR UP TO 12HOURS.) (Patient not taking: Reported on 6/20/2025), Disp: , Rfl:     Magnesium Oxide 400 (240 Mg) MG tablet, Take 1 tablet by mouth 2 (Two) Times a Day. (Patient not taking: Reported on 10/30/2024), Disp: 60 tablet, Rfl: 4    methylPREDNISolone (MEDROL) 4 MG tablet, TAKE 1 ROW OF TABLETS BY MOUTH EACH DAY AS INSTRUCTED ON THE PACKAGE (Patient not taking: Reported on 6/20/2025), Disp: , Rfl:     Naloxone HCl (Kloxxado) 8 MG/0.1ML liquid, INSTILL 1 SPRAY INTO 1 NOSTRIL. INSTILL 2ND DOSE FROM NEW CONTAINER IN OTHER NOSTRIL IF NO RESPONSE OR RELAPSE OCCURS AS NEEDED. CALL 911 (Patient not taking: Reported on 6/20/2025), Disp: , Rfl:     nortriptyline (PAMELOR) 50 MG capsule, , Disp: , Rfl:     tiZANidine (ZANAFLEX) 4 MG tablet, Take 1 tablet by mouth Every 8 (Eight) Hours As Needed for Muscle Spasms. (Patient not taking: Reported on 6/20/2025),  Disp: 90 tablet, Rfl: 5    triamcinolone (KENALOG) 0.1 % cream, APPLY TOPICALLY TO AFFECTED AREA(S) 2 TIMES DAILY AS NEEDED. DO NOT USE FOR MORE THAN 14 DAYS. (Patient not taking: Reported on 2025), Disp: , Rfl:     History:  Past Medical History:   Diagnosis Date    Acid reflux     Arthritis     Asthma     Chronic bronchitis     Colon polyp 2015    Diabetes     Diverticulitis     Diverticulitis of colon     I think    Diverticulosis 2017    Fracture     lateral left ankle    Gallstones     Gout     Headache 2016    Hernia     When i was 11 years old!    Hypertension     Ileitis 2019    Kidney stone 2022    Passed    Low back pain 2008    Neuromuscular disorder 2006    Pancreatitis O    Thyroid disease     Type 2 diabetes mellitus     Visual impairment        Past Surgical History:   Procedure Laterality Date     SECTION      CHOLECYSTECTOMY      COLONOSCOPY  ,    DR LYLES,DR MARISCAL    COLONOSCOPY N/A 2022    Procedure: COLONOSCOPY with hot biopsy polypectomy x 5 and hot snare polypectomy x2;  Surgeon: Jesus Mariscal MD;  Location: [facility] ENDOSCOPY;  Service: Gastroenterology;  Laterality: N/A;    DILATATION AND CURETTAGE      ENDOSCOPY      DR MARISCAL BEREA    FOOT SURGERY      HYSTERECTOMY  2006    Total    UPPER GASTROINTESTINAL ENDOSCOPY  2015       Family History   Problem Relation Age of Onset    Arthritis Mother     Mental illness Mother     Migraines Mother     Osteoporosis Mother     Thyroid disease Mother     Depression Mother     Heart disease Mother     Colon polyps Mother     Colon cancer Father         passed away at age 53    Cancer Father     Colon polyps Father     Thyroid disease Sister     Breast cancer Cousin     Esophageal cancer Maternal Grandfather     Cancer Maternal Grandfather     Vision loss Paternal Grandmother     Asthma Maternal Aunt     Diabetes Maternal Uncle     Ovarian cancer Neg Hx        Social History     Tobacco Use  "   Smoking status: Former     Current packs/day: 1.00     Average packs/day: 1 pack/day for 20.0 years (20.0 ttl pk-yrs)     Types: Cigarettes    Smokeless tobacco: Never   Vaping Use    Vaping status: Never Used   Substance Use Topics    Alcohol use: No    Drug use: No        OBJECTIVE:    Vital Signs:   Vitals:    06/20/25 1440   BP: 128/84   Pulse: 81   Temp: 97.7 °F (36.5 °C)   TempSrc: Infrared   SpO2: 98%   Weight: 90.7 kg (200 lb)   Height: 160 cm (62.99\")       Physical Exam:       Lungs-clear  Heart-regular rate  Abdomen-slightly tender epigastric region.  No rebound or guarding    Results Review:   I reviewed the patient's new clinical results.    Review of Systems was reviewed and confirmed as accurate as documented by the MA.    ASSESSMENT/PLAN:    1. Oral phase dysphagia    2. Gastroesophageal reflux disease with esophagitis without hemorrhage    3. History of colon polyps        Patient is scheduled for a EGD and colonoscopy.  Possible dilatation if stenosis identified.  Risk of bleeding and perforation discussed and the patient agreeable.  She had no further question was to proceed with the endoscopies.  Patient also told avoid caffeine alcohol and nicotine, continue prescription Protonix 40 mg daily.  Refill will be given as needed.  Also recommend that patient avoid hard meats and dry bread.          Electronically signed by Jesus Melendez MD  06/20/25          "

## 2025-06-20 ENCOUNTER — OFFICE VISIT (OUTPATIENT)
Dept: SURGERY | Facility: CLINIC | Age: 55
End: 2025-06-20
Payer: COMMERCIAL

## 2025-06-20 VITALS
HEIGHT: 63 IN | HEART RATE: 81 BPM | SYSTOLIC BLOOD PRESSURE: 128 MMHG | DIASTOLIC BLOOD PRESSURE: 84 MMHG | TEMPERATURE: 97.7 F | OXYGEN SATURATION: 98 % | BODY MASS INDEX: 35.44 KG/M2 | WEIGHT: 200 LBS

## 2025-06-20 DIAGNOSIS — R13.11 ORAL PHASE DYSPHAGIA: Primary | ICD-10-CM

## 2025-06-20 DIAGNOSIS — Z86.0100 HISTORY OF COLON POLYPS: ICD-10-CM

## 2025-06-20 DIAGNOSIS — K21.00 GASTROESOPHAGEAL REFLUX DISEASE WITH ESOPHAGITIS WITHOUT HEMORRHAGE: ICD-10-CM

## 2025-06-20 PROCEDURE — 99214 OFFICE O/P EST MOD 30 MIN: CPT | Performed by: SURGERY

## 2025-06-20 RX ORDER — MAGNESIUM CARB/ALUMINUM HYDROX 105-160MG
296 TABLET,CHEWABLE ORAL ONCE
Qty: 296 ML | Refills: 0 | Status: SHIPPED | OUTPATIENT
Start: 2025-06-20 | End: 2025-06-20

## 2025-06-20 RX ORDER — RESMETIROM 80 MG/1
80 TABLET, COATED ORAL DAILY
COMMUNITY
Start: 2025-05-20 | End: 2026-05-20

## 2025-06-20 RX ORDER — POLYETHYLENE GLYCOL 3350 17 G/17G
POWDER, FOR SOLUTION ORAL
Qty: 238 G | Refills: 0 | Status: SHIPPED | OUTPATIENT
Start: 2025-06-20

## 2025-06-20 RX ORDER — BISACODYL 5 MG
TABLET, DELAYED RELEASE (ENTERIC COATED) ORAL
Qty: 4 TABLET | Refills: 0 | Status: SHIPPED | OUTPATIENT
Start: 2025-06-20

## 2025-06-20 RX ORDER — MOLNUPIRAVIR 200 MG/1
CAPSULE ORAL
COMMUNITY

## 2025-07-03 NOTE — PRE-PROCEDURE INSTRUCTIONS
PAT phone history completed with patient for upcoming procedure on 7/14/25.    PAT PASS reviewed with patient and he/she verbalized understanding of the following:     Do not eat or drink anything after midnight the night before procedure unless otherwise instructed by physician/surgeon's office, this includes no gum, candy, mints, tobacco products or e-cigarettes.  Do not shave the area to be operated on at least 48 hours prior to procedure.  Do not wear makeup, lotion, hair products, or nail polish.  Do not wear any jewelry and remove all piercings.  Do not wear any adhesive if you wear dentures.  Do not wear contacts; bring in glasses if needed.  Only take medications on the morning of procedure as instructed by PAT nurse per anesthesia guidelines or as instructed by physician's office.   If you are on any blood thinners reach out to the physician/surgeon's office for instructions on when/if they will need to be stopped prior to procedure.   Bring in picture ID and insurance card, advanced directive copies if applicable, CPAP/BIPAP/Inhalers if indicated morning of procedure, leave any other valuables at home.  Ensure you have arranged for someone to drive you home the day of your procedure and someone to care for you at home afterwards. It is recommended that you do not drive, drink alcohol, or make any major legal decisions for at least 24 hours after your procedure is complete.      Instructions given on hospital entrance and registration location.

## 2025-07-14 ENCOUNTER — ANESTHESIA (OUTPATIENT)
Dept: GASTROENTEROLOGY | Facility: HOSPITAL | Age: 55
End: 2025-07-14
Payer: COMMERCIAL

## 2025-07-14 ENCOUNTER — HOSPITAL ENCOUNTER (OUTPATIENT)
Facility: HOSPITAL | Age: 55
Setting detail: HOSPITAL OUTPATIENT SURGERY
Discharge: HOME | End: 2025-07-14
Attending: SURGERY | Admitting: SURGERY
Payer: COMMERCIAL

## 2025-07-14 ENCOUNTER — ANESTHESIA EVENT (OUTPATIENT)
Dept: GASTROENTEROLOGY | Facility: HOSPITAL | Age: 55
End: 2025-07-14
Payer: COMMERCIAL

## 2025-07-14 VITALS
TEMPERATURE: 98.1 F | DIASTOLIC BLOOD PRESSURE: 75 MMHG | HEART RATE: 76 BPM | SYSTOLIC BLOOD PRESSURE: 110 MMHG | RESPIRATION RATE: 18 BRPM | OXYGEN SATURATION: 92 %

## 2025-07-14 DIAGNOSIS — K21.00 GASTROESOPHAGEAL REFLUX DISEASE WITH ESOPHAGITIS WITHOUT HEMORRHAGE: ICD-10-CM

## 2025-07-14 DIAGNOSIS — R13.11 ORAL PHASE DYSPHAGIA: ICD-10-CM

## 2025-07-14 DIAGNOSIS — Z86.0100 HISTORY OF COLON POLYPS: ICD-10-CM

## 2025-07-14 PROCEDURE — 25010000002 PROPOFOL 200 MG/20ML EMULSION: Performed by: NURSE ANESTHETIST, CERTIFIED REGISTERED

## 2025-07-14 PROCEDURE — C1726 CATH, BAL DIL, NON-VASCULAR: HCPCS | Performed by: SURGERY

## 2025-07-14 PROCEDURE — 25810000003 LACTATED RINGERS PER 1000 ML: Performed by: SURGERY

## 2025-07-14 RX ORDER — LIDOCAINE HYDROCHLORIDE 10 MG/ML
0.5 INJECTION, SOLUTION INFILTRATION; PERINEURAL ONCE AS NEEDED
Status: DISCONTINUED | OUTPATIENT
Start: 2025-07-14 | End: 2025-07-14 | Stop reason: HOSPADM

## 2025-07-14 RX ORDER — PROPOFOL 10 MG/ML
INJECTION, EMULSION INTRAVENOUS AS NEEDED
Status: DISCONTINUED | OUTPATIENT
Start: 2025-07-14 | End: 2025-07-14 | Stop reason: SURG

## 2025-07-14 RX ORDER — SODIUM CHLORIDE 0.9 % (FLUSH) 0.9 %
10 SYRINGE (ML) INJECTION AS NEEDED
Status: DISCONTINUED | OUTPATIENT
Start: 2025-07-14 | End: 2025-07-14 | Stop reason: HOSPADM

## 2025-07-14 RX ORDER — SODIUM CHLORIDE, SODIUM LACTATE, POTASSIUM CHLORIDE, CALCIUM CHLORIDE 600; 310; 30; 20 MG/100ML; MG/100ML; MG/100ML; MG/100ML
1000 INJECTION, SOLUTION INTRAVENOUS CONTINUOUS
Status: DISCONTINUED | OUTPATIENT
Start: 2025-07-14 | End: 2025-07-14 | Stop reason: HOSPADM

## 2025-07-14 RX ORDER — ONDANSETRON 2 MG/ML
4 INJECTION INTRAMUSCULAR; INTRAVENOUS ONCE AS NEEDED
Status: DISCONTINUED | OUTPATIENT
Start: 2025-07-14 | End: 2025-07-14 | Stop reason: HOSPADM

## 2025-07-14 RX ORDER — LIDOCAINE HCL/PF 100 MG/5ML
SYRINGE (ML) INJECTION AS NEEDED
Status: DISCONTINUED | OUTPATIENT
Start: 2025-07-14 | End: 2025-07-14 | Stop reason: SURG

## 2025-07-14 RX ORDER — KETAMINE HCL IN NACL, ISO-OSM 100MG/10ML
SYRINGE (ML) INJECTION AS NEEDED
Status: DISCONTINUED | OUTPATIENT
Start: 2025-07-14 | End: 2025-07-14 | Stop reason: SURG

## 2025-07-14 RX ADMIN — Medication 25 MG: at 10:43

## 2025-07-14 RX ADMIN — PROPOFOL 50 MG: 10 INJECTION, EMULSION INTRAVENOUS at 10:59

## 2025-07-14 RX ADMIN — Medication 40 MG: at 10:43

## 2025-07-14 RX ADMIN — SODIUM CHLORIDE, POTASSIUM CHLORIDE, SODIUM LACTATE AND CALCIUM CHLORIDE: 600; 310; 30; 20 INJECTION, SOLUTION INTRAVENOUS at 10:37

## 2025-07-14 RX ADMIN — PROPOFOL 100 MG: 10 INJECTION, EMULSION INTRAVENOUS at 10:43

## 2025-07-14 RX ADMIN — PROPOFOL 100 MG: 10 INJECTION, EMULSION INTRAVENOUS at 10:51

## 2025-07-14 NOTE — ANESTHESIA POSTPROCEDURE EVALUATION
Patient: Karli Ricardo    Procedure Summary       Date: 07/14/25 Room / Location: [facility] ENDOSCOPY 3 / [facility] ENDOSCOPY    Anesthesia Start: 1037 Anesthesia Stop: 1106    Procedures:       ESOPHAGOGASTRODUODENOSCOPY WITH BIOPSY AND DILATATION (Esophagus)      COLONOSCOPY Diagnosis:       Oral phase dysphagia      Gastroesophageal reflux disease with esophagitis without hemorrhage      History of colon polyps      (Oral phase dysphagia [R13.11])      (Gastroesophageal reflux disease with esophagitis without hemorrhage [K21.00])      (History of colon polyps [Z86.0100])    Surgeons: Jesus Melendez MD Provider: Gianfranco Vallejo CRNA    Anesthesia Type: MAC ASA Status: 3            Anesthesia Type: MAC    Vitals  HR 72  Sat 97  Resp 16  BP 90/56  Temp 97.6        Post Anesthesia Care and Evaluation    Patient location during evaluation: bedside  Patient participation: complete - patient participated  Level of consciousness: awake and alert and sleepy but conscious  Pain score: 0  Pain management: adequate    Airway patency: patent  Anesthetic complications: No anesthetic complications  PONV Status: none  Cardiovascular status: acceptable  Respiratory status: acceptable  Hydration status: acceptable

## 2025-07-14 NOTE — ANESTHESIA PREPROCEDURE EVALUATION
Anesthesia Evaluation     Patient summary reviewed and Nursing notes reviewed   no history of anesthetic complications:   NPO Solid Status: > 8 hours  NPO Liquid Status: > 8 hours           Airway   Mallampati: II  TM distance: >3 FB  Neck ROM: full  Possible difficult intubation, Difficult intubation highly probable and Large neck circumference  Dental      Pulmonary    (+) a smoker Former, COPD, asthma,shortness of breath, sleep apnea, decreased breath sounds  Cardiovascular     (+) hypertension, VILLANUEVA, PVD  CAD:  inc risk obese, jolie, ? dm.      Neuro/Psych  (+) headaches, numbness, psychiatric history Anxiety and Depression  GI/Hepatic/Renal/Endo    (+) obesity, GERD, renal disease- stones, diabetes mellitus type 2 poorly controlled, thyroid problem hypothyroidism    Musculoskeletal     (+) arthralgias, back pain, chronic pain, myalgias  Abdominal   (+) obese   Substance History      OB/GYN          Other   arthritis,                   Anesthesia Plan    ASA 3     MAC     (Risks and benefits discussed including risk of aspiration, recall and dental damage. All patient questions answered.    Will continue with plan of care.)  intravenous induction     Anesthetic plan, risks, benefits, and alternatives have been provided, discussed and informed consent has been obtained with: patient.  Pre-procedure education provided      CODE STATUS:

## 2025-07-15 LAB — REF LAB TEST METHOD: NORMAL

## 2025-07-16 NOTE — PROGRESS NOTES
"Patient: Karli Ricardo  YOB: 1970    Date: 07/21/2025    Primary Care Provider: Lovely Loyd MD    Chief Complaint   Patient presents with    Follow-up     Colon/egd       History: The patient is in the office today in follow up from colonoscopy and EGD.  Pathology was reviewed and indicated reflux and tubular adenoma.  Patient still has problems swallowing certain foods.  Takes PPI medication by prescription daily.    The following portions of the patient's history were reviewed and updated as appropriate: allergies, current medications, past family history, past medical history, past social history, past surgical history and problem list.    Review of Systems   Constitutional:  Negative for chills, fever and unexpected weight change.   HENT:  Negative for hearing loss, trouble swallowing and voice change.    Eyes:  Negative for visual disturbance.   Respiratory:  Negative for apnea, cough, chest tightness, shortness of breath and wheezing.    Cardiovascular:  Negative for chest pain, palpitations and leg swelling.   Gastrointestinal:  Negative for abdominal distention, abdominal pain, anal bleeding, blood in stool, constipation, diarrhea, nausea, rectal pain and vomiting.   Endocrine: Negative for cold intolerance and heat intolerance.   Genitourinary:  Negative for difficulty urinating, dysuria and flank pain.   Musculoskeletal:  Negative for back pain and gait problem.   Skin:  Negative for color change, rash and wound.   Neurological:  Negative for dizziness, syncope, speech difficulty, weakness, light-headedness, numbness and headaches.   Hematological:  Negative for adenopathy. Does not bruise/bleed easily.   Psychiatric/Behavioral:  Negative for confusion. The patient is not nervous/anxious.        Vital Signs  Vitals:    07/21/25 1452   BP: 114/74   Pulse: 95   Temp: 97.8 °F (36.6 °C)   TempSrc: Infrared   SpO2: 95%   Weight: 90.7 kg (200 lb)   Height: 160 cm (62.99\") "       Allergies:  Allergies   Allergen Reactions    Metformin Other (See Comments)     Abdominal pain    Latex Swelling     Happened once       Medications:    Current Outpatient Medications:     albuterol sulfate  (90 Base) MCG/ACT inhaler, Inhale 1-2 puffs Every 6 (Six) Hours As Needed for Wheezing or Shortness of Air., Disp: 18 g, Rfl: 5    Diclofenac Sodium (VOLTAREN) 1 % gel gel, APPLY 2 GRAMS TOPICALLY TO THE AFFECTED AREA(S) FOUR TIMES A DAY AS DIRECTED, Disp: , Rfl:     Enbrel SureClick 50 MG/ML solution auto-injector, , Disp: , Rfl:     fluconazole (Diflucan) 100 MG tablet, Take 1 tablet by mouth Daily., Disp: 7 tablet, Rfl: 1    fluconazole (DIFLUCAN) 150 MG tablet, Take one tablet at onset of symptoms, then repeat 3 days later., Disp: 2 tablet, Rfl: 5    gabapentin (NEURONTIN) 800 MG tablet, Take 1 tablet by mouth 3 (Three) Times a Day., Disp: , Rfl:     Insulin Glargine, 1 Unit Dial, (Toujeo SoloStar) 300 UNIT/ML solution pen-injector injection, Inject 75 Units under the skin into the appropriate area as directed Every Night., Disp: 24 mL, Rfl: 1    Insulin Lispro (HumaLOG KwikPen) 200 UNIT/ML solution pen-injector, Inject 25-35 Units under the skin into the appropriate area as directed 3 (Three) Times a Day With Meals., Disp: 48 mL, Rfl: 1    Insulin Pen Needle (BD Pen Needle Oneyda U/F) 32G X 4 MM misc, Use 1 Units 4 (Four) Times a Day., Disp: 100 each, Rfl: 12    ipratropium-albuterol (DUO-NEB) 0.5-2.5 mg/3 ml nebulizer, Take 3 mL by nebulization Every 4 (Four) Hours As Needed for Wheezing., Disp: 360 mL, Rfl: 0    levothyroxine (Synthroid) 50 MCG tablet, Take 1 tablet by mouth Every Morning. For hypothyroidism., Disp: 90 tablet, Rfl: 1    lisinopril (PRINIVIL,ZESTRIL) 5 MG tablet, Take 1 tablet by mouth Every Night. For high blood pressure and kidney protection from diabetes mellitus., Disp: 90 tablet, Rfl: 1    ondansetron ODT (ZOFRAN-ODT) 4 MG disintegrating tablet, Place 1 tablet under the  tongue Every 8 (Eight) Hours As Needed for Nausea or Vomiting., Disp: 10 tablet, Rfl: 2    ONE TOUCH ULTRA TEST test strip, USE TO CHECK BLOOD GLUCOSE ONCE DAILY OR AS DIRECTED, Disp: , Rfl: 12    pantoprazole (PROTONIX) 40 MG EC tablet, Take 1 tablet by mouth Daily As Needed (heartburn/reflux)., Disp: 90 tablet, Rfl: 3    Rezdiffra 80 MG tablet, Take 1 tablet by mouth Daily., Disp: , Rfl:     traMADol (ULTRAM) 50 MG tablet, Take 2 tablets 3 times a day by oral route for 30 days., Disp: , Rfl:     bisacodyl 5 MG EC tablet, 4 tablets to be taken at time directed on instructions the day prior to colonoscopy (Patient not taking: Reported on 7/21/2025), Disp: 4 tablet, Rfl: 0    cholecalciferol (VITAMIN D3) 1.25 MG (20665 UT) capsule, Take 1 capsule by mouth 1 (One) Time Per Week. (Patient not taking: Reported on 10/30/2024), Disp: , Rfl:     HYDROcodone-acetaminophen (NORCO) 5-325 MG per tablet, Take 1 tablet 3 times a day by oral route for 30 days. (Patient not taking: Reported on 10/30/2024), Disp: , Rfl:     leflunomide (ARAVA) 20 MG tablet, leflunomide 20 mg tablet (Patient not taking: Reported on 10/30/2024), Disp: , Rfl:     lidocaine (Lidoderm) 5 %, APPLY 1 PATCH BY TOPICAL ROUTE ONCE DAILY (MAY WEAR UP TO 12HOURS.) (Patient not taking: Reported on 10/30/2024), Disp: , Rfl:     Magnesium Oxide 400 (240 Mg) MG tablet, Take 1 tablet by mouth 2 (Two) Times a Day. (Patient not taking: Reported on 10/30/2024), Disp: 60 tablet, Rfl: 4    methylPREDNISolone (MEDROL) 4 MG tablet, TAKE 1 ROW OF TABLETS BY MOUTH EACH DAY AS INSTRUCTED ON THE PACKAGE (Patient not taking: Reported on 10/30/2024), Disp: , Rfl:     Molnupiravir (Lagevrio) 200 MG capsule, TAKE 4 CAPSULES BY MOUTH EVERY 12 HOURS FOR 5 DAYS (Patient not taking: Reported on 7/21/2025), Disp: , Rfl:     Naloxone HCl (Kloxxado) 8 MG/0.1ML liquid, INSTILL 1 SPRAY INTO 1 NOSTRIL. INSTILL 2ND DOSE FROM NEW CONTAINER IN OTHER NOSTRIL IF NO RESPONSE OR RELAPSE OCCURS AS  NEEDED. CALL 911 (Patient not taking: Reported on 10/30/2024), Disp: , Rfl:     nortriptyline (PAMELOR) 50 MG capsule, , Disp: , Rfl:     polyethylene glycol (MIRALAX) 17 GM/SCOOP powder, Mix 238g powder with 64 oz of clear liquid. Use as directed. (Patient not taking: Reported on 7/21/2025), Disp: 238 g, Rfl: 0    tiZANidine (ZANAFLEX) 4 MG tablet, Take 1 tablet by mouth Every 8 (Eight) Hours As Needed for Muscle Spasms. (Patient not taking: Reported on 10/30/2024), Disp: 90 tablet, Rfl: 5    triamcinolone (KENALOG) 0.1 % cream, APPLY TOPICALLY TO AFFECTED AREA(S) 2 TIMES DAILY AS NEEDED. DO NOT USE FOR MORE THAN 14 DAYS. (Patient not taking: Reported on 10/30/2024), Disp: , Rfl:     Physical Exam:    Abdomen-slightly tender epigastric region.     Results Review:   I reviewed the patient's new clinical results.     Review of Systems was reviewed and confirmed as accurate as documented by the MA.    ASSESSMENT/PLAN:    1. Oral phase dysphagia    2. Gastroesophageal reflux disease with esophagitis without hemorrhage    3. Adenomatous polyp of transverse colon       Recommend repeat colonoscopy in 5 years.  Maintain a GI soft diet, avoid caffeine alcohol and nicotine.  Continue prescription PPI medication daily.    Electronically signed by Jesus Melendez MD  07/21/25

## 2025-07-21 ENCOUNTER — OFFICE VISIT (OUTPATIENT)
Dept: SURGERY | Facility: CLINIC | Age: 55
End: 2025-07-21
Payer: COMMERCIAL

## 2025-07-21 VITALS
OXYGEN SATURATION: 95 % | HEART RATE: 95 BPM | WEIGHT: 200 LBS | HEIGHT: 63 IN | DIASTOLIC BLOOD PRESSURE: 74 MMHG | TEMPERATURE: 97.8 F | SYSTOLIC BLOOD PRESSURE: 114 MMHG | BODY MASS INDEX: 35.44 KG/M2

## 2025-07-21 DIAGNOSIS — D12.3 ADENOMATOUS POLYP OF TRANSVERSE COLON: ICD-10-CM

## 2025-07-21 DIAGNOSIS — R13.11 ORAL PHASE DYSPHAGIA: Primary | ICD-10-CM

## 2025-07-21 DIAGNOSIS — K21.00 GASTROESOPHAGEAL REFLUX DISEASE WITH ESOPHAGITIS WITHOUT HEMORRHAGE: ICD-10-CM

## 2025-07-21 LAB — GLUCOSE BLDC GLUCOMTR-MCNC: 377 MG/DL (ref 70–130)

## 2025-07-21 PROCEDURE — 99212 OFFICE O/P EST SF 10 MIN: CPT | Performed by: SURGERY

## (undated) DEVICE — CANISTER, RIGID, 2000CC: Brand: MEDLINE INDUSTRIES, INC.

## (undated) DEVICE — LUBE JELLY PK/2.75GM STRL BX/144

## (undated) DEVICE — Device

## (undated) DEVICE — PATIENT RETURN ELECTRODE, SINGLE-USE, CONTACT QUALITY MONITORING, ADULT, WITH 9FT CORD, FOR PATIENTS WEIGING OVER 33LBS. (15KG): Brand: MEGADYNE

## (undated) DEVICE — ENDOSCOPY PORT CONNECTOR FOR OLYMPUS® SCOPES: Brand: ERBE

## (undated) DEVICE — FRCP BIOP RADLJAW4 HOT 2.2X240 BX40

## (undated) DEVICE — MEDI-VAC NON-CONDUCTIVE SUCTION TUBING: Brand: CARDINAL HEALTH

## (undated) DEVICE — CONMED SCOPE SAVER BITE BLOCK, 20X27 MM: Brand: SCOPE SAVER

## (undated) DEVICE — GLV SURG SENSICARE W/ALOE PF LF 7.5 STRL

## (undated) DEVICE — ESOPHAGEAL BALLOON DILATATION CATHETER: Brand: CRE FIXED WIRE

## (undated) DEVICE — NET RESCUENET F/B RETRV

## (undated) DEVICE — HYBRID TUBING/CAP SET FOR OLYMPUS® SCOPES: Brand: ERBE

## (undated) DEVICE — HYBRID CO2 TUBING/CAP SET FOR OLYMPUS® SCOPES & CO2 SOURCE: Brand: ERBE

## (undated) DEVICE — QUICK CATCH IN-LINE SUCTION POLYP TRAP IS USED FOR SUCTION RETRIEVAL OF ENDOSCOPICALLY REMOVED POLYPS.

## (undated) DEVICE — DEV INFL ALLIANCE2 SYS

## (undated) DEVICE — SINGLE-USE POLYPECTOMY SNARE: Brand: CAPTIVATOR II

## (undated) DEVICE — VLV SXN AIR/H2O ORCAPOD3 1P/U STRL

## (undated) DEVICE — FRCP BX RADJAW4 NDL 2.8 240 STD OG

## (undated) DEVICE — SYR LUER SLPTP 50ML